# Patient Record
Sex: FEMALE | Race: WHITE | NOT HISPANIC OR LATINO | Employment: OTHER | URBAN - METROPOLITAN AREA
[De-identification: names, ages, dates, MRNs, and addresses within clinical notes are randomized per-mention and may not be internally consistent; named-entity substitution may affect disease eponyms.]

---

## 2017-01-03 ENCOUNTER — TELEPHONE (OUTPATIENT)
Dept: INTERNAL MEDICINE | Facility: CLINIC | Age: 82
End: 2017-01-03

## 2017-01-03 NOTE — TELEPHONE ENCOUNTER
----- Message from Kylie MAURICE Good sent at 1/3/2017  3:52 PM CST -----  Contact: Call Rowena, daughter, [phone]  Rowena called Requesting to speak to you concerning Pt taking Xarelto and having a dental procedure on Friday 01-06-17.  Pt has not taken medication today and is wanting to know if Pt can stop until after procedure and how long Pt should stop medication.

## 2017-01-03 NOTE — TELEPHONE ENCOUNTER
Spoke to pt's daughter, Rowena. She stated that the pt needs a wisdom tooth extracted. She stated that the oral surgeon is requesting how the pt should hold the xarelto.  I advised that Dr. Estrada does not prescribe xarelto, therefore she will need to contact the original perscriber in arrhythmia, or the pt's cardiologist for further instructions.

## 2017-01-04 ENCOUNTER — TELEPHONE (OUTPATIENT)
Dept: ELECTROPHYSIOLOGY | Facility: CLINIC | Age: 82
End: 2017-01-04

## 2017-01-04 NOTE — TELEPHONE ENCOUNTER
Nurse called and spoke with Nasreen and informed her that Dr Ann likes patients to hold xarelto 3 days prior to procedure so last dose for procedure on 1/6 17 would have been 1/2/17. Nurse further explained that it will be up to oral surgeon to decide when patient will be cleared to resume Xarelto after procedure. Nasreen acknowledged.    ----- Message from Jami Garcia MA sent at 1/3/2017  5:21 PM CST -----  Contact: nasreen daughter [phone]  Pt needs a clearance for extraction of wisdom tooth for Fri 1/6.  ----- Message -----     From: Davina Tsai MA     Sent: 1/3/2017   4:51 PM       To: Osman AGUIRRE Staff    She is calling about mother needs a clearance for extraction of wisdom tooth for Fri 1/6. Please call Nasreen. She 's on Xarelto will not take today.

## 2017-01-23 ENCOUNTER — CLINICAL SUPPORT (OUTPATIENT)
Dept: ELECTROPHYSIOLOGY | Facility: CLINIC | Age: 82
End: 2017-01-23
Payer: MEDICARE

## 2017-01-23 DIAGNOSIS — I44.2 CHB (COMPLETE HEART BLOCK): ICD-10-CM

## 2017-01-23 DIAGNOSIS — Z95.0 CARDIAC PACEMAKER IN SITU: ICD-10-CM

## 2017-01-23 PROCEDURE — 93294 REM INTERROG EVL PM/LDLS PM: CPT | Mod: ,,, | Performed by: INTERNAL MEDICINE

## 2017-01-23 PROCEDURE — 93296 REM INTERROG EVL PM/IDS: CPT | Mod: PBBFAC | Performed by: INTERNAL MEDICINE

## 2017-01-24 ENCOUNTER — OFFICE VISIT (OUTPATIENT)
Dept: PULMONOLOGY | Facility: CLINIC | Age: 82
End: 2017-01-24
Payer: MEDICARE

## 2017-01-24 ENCOUNTER — HOSPITAL ENCOUNTER (OUTPATIENT)
Dept: PULMONOLOGY | Facility: CLINIC | Age: 82
Discharge: HOME | End: 2017-01-24
Payer: MEDICARE

## 2017-01-24 ENCOUNTER — CLINICAL SUPPORT (OUTPATIENT)
Dept: INFECTIOUS DISEASES | Facility: CLINIC | Age: 82
End: 2017-01-24
Payer: MEDICARE

## 2017-01-24 ENCOUNTER — OFFICE VISIT (OUTPATIENT)
Dept: ENDOCRINOLOGY | Facility: CLINIC | Age: 82
End: 2017-01-24
Payer: MEDICARE

## 2017-01-24 VITALS
HEIGHT: 60 IN | BODY MASS INDEX: 26.7 KG/M2 | SYSTOLIC BLOOD PRESSURE: 108 MMHG | OXYGEN SATURATION: 95 % | DIASTOLIC BLOOD PRESSURE: 70 MMHG | HEART RATE: 83 BPM | WEIGHT: 136 LBS

## 2017-01-24 VITALS
SYSTOLIC BLOOD PRESSURE: 158 MMHG | DIASTOLIC BLOOD PRESSURE: 78 MMHG | WEIGHT: 136.69 LBS | HEART RATE: 80 BPM | HEIGHT: 60 IN | BODY MASS INDEX: 26.84 KG/M2

## 2017-01-24 DIAGNOSIS — E11.65 TYPE 2 DIABETES MELLITUS WITH HYPERGLYCEMIA, WITH LONG-TERM CURRENT USE OF INSULIN: Primary | Chronic | ICD-10-CM

## 2017-01-24 DIAGNOSIS — J84.9 ILD (INTERSTITIAL LUNG DISEASE): ICD-10-CM

## 2017-01-24 DIAGNOSIS — J84.10 PULMONARY INTERSTITIAL FIBROSIS: Primary | ICD-10-CM

## 2017-01-24 DIAGNOSIS — E55.9 VITAMIN D DEFICIENCY: ICD-10-CM

## 2017-01-24 DIAGNOSIS — Z79.4 TYPE 2 DIABETES MELLITUS WITH HYPERGLYCEMIA, WITH LONG-TERM CURRENT USE OF INSULIN: Primary | Chronic | ICD-10-CM

## 2017-01-24 PROCEDURE — 94729 DIFFUSING CAPACITY: CPT | Mod: 26,S$PBB,, | Performed by: INTERNAL MEDICINE

## 2017-01-24 PROCEDURE — 94727 GAS DIL/WSHOT DETER LNG VOL: CPT | Mod: 26,S$PBB,, | Performed by: INTERNAL MEDICINE

## 2017-01-24 PROCEDURE — 99999 PR PBB SHADOW E&M-EST. PATIENT-LVL III: CPT | Mod: PBBFAC,,, | Performed by: INTERNAL MEDICINE

## 2017-01-24 PROCEDURE — 99214 OFFICE O/P EST MOD 30 MIN: CPT | Mod: S$PBB,,, | Performed by: INTERNAL MEDICINE

## 2017-01-24 PROCEDURE — 90662 IIV NO PRSV INCREASED AG IM: CPT | Mod: PBBFAC | Performed by: INTERNAL MEDICINE

## 2017-01-24 PROCEDURE — 99999 PR PBB SHADOW E&M-EST. PATIENT-LVL I: CPT | Mod: PBBFAC,,,

## 2017-01-24 PROCEDURE — G0008 ADMIN INFLUENZA VIRUS VAC: HCPCS | Mod: PBBFAC

## 2017-01-24 PROCEDURE — G0009 ADMIN PNEUMOCOCCAL VACCINE: HCPCS | Mod: PBBFAC | Performed by: INTERNAL MEDICINE

## 2017-01-24 PROCEDURE — 99211 OFF/OP EST MAY X REQ PHY/QHP: CPT | Mod: PBBFAC,27

## 2017-01-24 PROCEDURE — 99214 OFFICE O/P EST MOD 30 MIN: CPT | Mod: 25,S$PBB,, | Performed by: INTERNAL MEDICINE

## 2017-01-24 NOTE — PROGRESS NOTES
Subjective:       Patient ID: Jahaira Garcia is a 88 y.o. female.    Chief Complaint: Diabetes Mellitus    HPI   Pt is a 85 yo CF here to follow up for T2DM. PMH: HTN, HLD, ILD on prednisone 10mg BID. Pt had acute bronchitis that required hospitalization in jan-February and took abx x 2weeks. Still requiring home O2 but no worsening of PFTs per pt. No SOB, no CP. Pt reports deconditioning since hospitalization, and now using a walker, which is new for her.  Now on prednisone 10 mg BID because of resp problems. Lately sugars high in 200's.  Checks BS regularly, FBS ~170s, bedtime BS 89. Pt had 3-4 readings in 50-60s during acute illness, but no recent hypoglycemic sx. Currently  On and Humalog 18 units for breakfast, 18 units for lunch, 18 units for dinner plus correction  Prednisone  and 5 mg BID.  C/o mild diarrhea x 1 month. Also c/o B feet swelling x 1 week, without PND or SOB, has 1 pillow orthopnea.   Glocose at home .  Good appetite, lost about 7lbs due to acute bronchitis   No hypoglycemia.  Review of Systems   Constitutional: Positive for activity change and fatigue. Negative for fever.   HENT: Positive for voice change.    Eyes: Negative for pain, redness and visual disturbance.   Respiratory: Positive for cough, shortness of breath and wheezing.    Cardiovascular: Positive for leg swelling. Negative for chest pain and palpitations.   Gastrointestinal: Negative for abdominal pain, blood in stool, constipation, diarrhea, nausea and vomiting.   Endocrine: Positive for cold intolerance. Negative for heat intolerance.   Genitourinary: Positive for enuresis. Negative for dysuria.   Neurological: Positive for light-headedness. Negative for dizziness, syncope and headaches.       Objective:      Physical Exam    Physical Exam   Constitutional: She is oriented to person, place, and time. Vital signs are normal. No distress.   She is on O2   HENT:   Mouth/Throat: Oropharynx is clear and moist.   Eyes: EOM are  normal.   Neck: No thyromegaly present.   Cardiovascular: Normal rate, regular rhythm, normal heart sounds and intact distal pulses. Exam reveals no gallop and no friction rub.   No murmur heard.  Pulmonary/Chest: Effort normal and breath sounds normal. No respiratory distress. She has no wheezes. She has no rales.   Coarse BS noted  Abdominal: She exhibits no distension. There is no tenderness. There is no rebound and no guarding.   Genitourinary: Vaginal discharge:   Musculoskeletal: Normal range of motion. She exhibits no edema.   Neurological: She is oriented to person, place, and time. She has normal reflexes. No cranial nerve deficit.   Skin: No rash noted.   Psychiatric: She has a normal mood and affect.     Results for orders placed or performed during the hospital encounter of 09/16/16   Basic Metabolic Panel (BMP)   Result Value Ref Range    Sodium 137 136 - 145 mmol/L    Potassium 5.3 (H) 3.5 - 5.1 mmol/L    Chloride 100 95 - 110 mmol/L    CO2 28 23 - 29 mmol/L    Glucose 153 (H) 70 - 110 mg/dL    BUN, Bld 23 8 - 23 mg/dL    Creatinine 1.0 0.5 - 1.4 mg/dL    Calcium 9.2 8.7 - 10.5 mg/dL    Anion Gap 9 8 - 16 mmol/L    eGFR if African American 58.5 (A) >60 mL/min/1.73 m^2    eGFR if non African American 50.8 (A) >60 mL/min/1.73 m^2   CBC auto differential   Result Value Ref Range    WBC 14.13 (H) 3.90 - 12.70 K/uL    RBC 3.93 (L) 4.00 - 5.40 M/uL    Hemoglobin 11.9 (L) 12.0 - 16.0 g/dL    Hematocrit 38.3 37.0 - 48.5 %    MCV 98 82 - 98 fL    MCH 30.3 27.0 - 31.0 pg    MCHC 31.1 (L) 32.0 - 36.0 %    RDW 15.1 (H) 11.5 - 14.5 %    Platelets 404 (H) 150 - 350 K/uL    MPV 9.2 9.2 - 12.9 fL    Lymph # CANCELED 1.0 - 4.8 K/uL    Mono # CANCELED 0.3 - 1.0 K/uL    Eos # CANCELED 0.0 - 0.5 K/uL    Baso # CANCELED 0.00 - 0.20 K/uL    Gran% 81.0 (H) 38.0 - 73.0 %    Lymph% 9.0 (L) 18.0 - 48.0 %    Mono% 4.0 4.0 - 15.0 %    Eosinophil% 0.0 0.0 - 8.0 %    Basophil% 0.0 0.0 - 1.9 %    Bands 3.0 %    Metamyelocytes 2.0 %     Myelocytes 1.0 %    Differential Method Manual    Magnesium   Result Value Ref Range    Magnesium 1.9 1.6 - 2.6 mg/dL   Phosphorus   Result Value Ref Range    Phosphorus 4.0 2.7 - 4.5 mg/dL   Basic metabolic panel   Result Value Ref Range    Sodium 136 136 - 145 mmol/L    Potassium 5.1 3.5 - 5.1 mmol/L    Chloride 99 95 - 110 mmol/L    CO2 31 (H) 23 - 29 mmol/L    Glucose 322 (H) 70 - 110 mg/dL    BUN, Bld 29 (H) 8 - 23 mg/dL    Creatinine 1.2 0.5 - 1.4 mg/dL    Calcium 9.0 8.7 - 10.5 mg/dL    Anion Gap 6 (L) 8 - 16 mmol/L    eGFR if African American 47.0 (A) >60 mL/min/1.73 m^2    eGFR if non  40.7 (A) >60 mL/min/1.73 m^2   CBC auto differential   Result Value Ref Range    WBC 9.18 3.90 - 12.70 K/uL    RBC 3.61 (L) 4.00 - 5.40 M/uL    Hemoglobin 11.0 (L) 12.0 - 16.0 g/dL    Hematocrit 34.9 (L) 37.0 - 48.5 %    MCV 97 82 - 98 fL    MCH 30.5 27.0 - 31.0 pg    MCHC 31.5 (L) 32.0 - 36.0 %    RDW 15.0 (H) 11.5 - 14.5 %    Platelets 380 (H) 150 - 350 K/uL    MPV 9.1 (L) 9.2 - 12.9 fL    Lymph # CANCELED 1.0 - 4.8 K/uL    Mono # CANCELED 0.3 - 1.0 K/uL    Eos # CANCELED 0.0 - 0.5 K/uL    Baso # CANCELED 0.00 - 0.20 K/uL    Gran% 77.0 (H) 38.0 - 73.0 %    Lymph% 12.0 (L) 18.0 - 48.0 %    Mono% 1.0 (L) 4.0 - 15.0 %    Eosinophil% 0.0 0.0 - 8.0 %    Basophil% 0.0 0.0 - 1.9 %    Bands 6.0 %    Metamyelocytes 4.0 %    Differential Method Manual    Basic Metabolic Panel (BMP)   Result Value Ref Range    Sodium 135 (L) 136 - 145 mmol/L    Potassium 4.7 3.5 - 5.1 mmol/L    Chloride 99 95 - 110 mmol/L    CO2 29 23 - 29 mmol/L    Glucose 126 (H) 70 - 110 mg/dL    BUN, Bld 30 (H) 8 - 23 mg/dL    Creatinine 1.2 0.5 - 1.4 mg/dL    Calcium 9.2 8.7 - 10.5 mg/dL    Anion Gap 7 (L) 8 - 16 mmol/L    eGFR if African American 47.0 (A) >60 mL/min/1.73 m^2    eGFR if non  40.7 (A) >60 mL/min/1.73 m^2   CBC auto differential   Result Value Ref Range    WBC 8.13 3.90 - 12.70 K/uL    RBC 3.63 (L) 4.00 - 5.40  M/uL    Hemoglobin 11.2 (L) 12.0 - 16.0 g/dL    Hematocrit 35.2 (L) 37.0 - 48.5 %    MCV 97 82 - 98 fL    MCH 30.9 27.0 - 31.0 pg    MCHC 31.8 (L) 32.0 - 36.0 %    RDW 15.0 (H) 11.5 - 14.5 %    Platelets 365 (H) 150 - 350 K/uL    MPV 9.4 9.2 - 12.9 fL    Gran # 5.7 1.8 - 7.7 K/uL    Lymph # 1.9 1.0 - 4.8 K/uL    Mono # 0.5 0.3 - 1.0 K/uL    Eos # 0.0 0.0 - 0.5 K/uL    Baso # 0.03 0.00 - 0.20 K/uL    Gran% 69.7 38.0 - 73.0 %    Lymph% 23.5 18.0 - 48.0 %    Mono% 6.3 4.0 - 15.0 %    Eosinophil% 0.1 0.0 - 8.0 %    Basophil% 0.4 0.0 - 1.9 %    Differential Method Automated    Magnesium   Result Value Ref Range    Magnesium 1.7 1.6 - 2.6 mg/dL   Phosphorus   Result Value Ref Range    Phosphorus 3.6 2.7 - 4.5 mg/dL   Basic metabolic panel   Result Value Ref Range    Sodium 136 136 - 145 mmol/L    Potassium 5.6 (H) 3.5 - 5.1 mmol/L    Chloride 96 95 - 110 mmol/L    CO2 28 23 - 29 mmol/L    Glucose 146 (H) 70 - 110 mg/dL    BUN, Bld 44 (H) 8 - 23 mg/dL    Creatinine 1.4 0.5 - 1.4 mg/dL    Calcium 9.1 8.7 - 10.5 mg/dL    Anion Gap 12 8 - 16 mmol/L    eGFR if African American 39.0 (A) >60 mL/min/1.73 m^2    eGFR if non  33.8 (A) >60 mL/min/1.73 m^2   Basic Metabolic Panel (BMP)   Result Value Ref Range    Sodium 138 136 - 145 mmol/L    Potassium 4.4 3.5 - 5.1 mmol/L    Chloride 100 95 - 110 mmol/L    CO2 27 23 - 29 mmol/L    Glucose 232 (H) 70 - 110 mg/dL    BUN, Bld 39 (H) 8 - 23 mg/dL    Creatinine 1.3 0.5 - 1.4 mg/dL    Calcium 8.3 (L) 8.7 - 10.5 mg/dL    Anion Gap 11 8 - 16 mmol/L    eGFR if African American 42.6 (A) >60 mL/min/1.73 m^2    eGFR if non  37.0 (A) >60 mL/min/1.73 m^2   CBC auto differential   Result Value Ref Range    WBC 9.22 3.90 - 12.70 K/uL    RBC 3.57 (L) 4.00 - 5.40 M/uL    Hemoglobin 10.9 (L) 12.0 - 16.0 g/dL    Hematocrit 34.8 (L) 37.0 - 48.5 %    MCV 98 82 - 98 fL    MCH 30.5 27.0 - 31.0 pg    MCHC 31.3 (L) 32.0 - 36.0 %    RDW 15.7 (H) 11.5 - 14.5 %    Platelets 280  150 - 350 K/uL    MPV 10.0 9.2 - 12.9 fL    Lymph # CANCELED 1.0 - 4.8 K/uL    Mono # CANCELED 0.3 - 1.0 K/uL    Eos # CANCELED 0.0 - 0.5 K/uL    Baso # CANCELED 0.00 - 0.20 K/uL    Gran% 72.0 38.0 - 73.0 %    Lymph% 15.0 (L) 18.0 - 48.0 %    Mono% 8.0 4.0 - 15.0 %    Eosinophil% 1.0 0.0 - 8.0 %    Basophil% 0.0 0.0 - 1.9 %    Bands 1.0 %    Metamyelocytes 3.0 %    Smudge Cells Present     Differential Method Manual    Magnesium   Result Value Ref Range    Magnesium 1.7 1.6 - 2.6 mg/dL   Phosphorus   Result Value Ref Range    Phosphorus 4.1 2.7 - 4.5 mg/dL   POCT glucose   Result Value Ref Range    POCT Glucose 308 (H) 70 - 110 mg/dL   POCT glucose   Result Value Ref Range    POCT Glucose 291 (H) 70 - 110 mg/dL   POCT glucose   Result Value Ref Range    POCT Glucose 233 (H) 70 - 110 mg/dL   POCT glucose   Result Value Ref Range    POCT Glucose 267 (H) 70 - 110 mg/dL   POCT glucose   Result Value Ref Range    POCT Glucose 133 (H) 70 - 110 mg/dL   POCT glucose   Result Value Ref Range    POCT Glucose 164 (H) 70 - 110 mg/dL   POCT glucose   Result Value Ref Range    POCT Glucose 248 (H) 70 - 110 mg/dL   POCT glucose   Result Value Ref Range    POCT Glucose 237 (H) 70 - 110 mg/dL   POCT glucose   Result Value Ref Range    POCT Glucose 149 (H) 70 - 110 mg/dL   POCT glucose   Result Value Ref Range    POCT Glucose 217 (H) 70 - 110 mg/dL   POCT glucose   Result Value Ref Range    POCT Glucose 231 (H) 70 - 110 mg/dL   POCT glucose   Result Value Ref Range    POCT Glucose 85 70 - 110 mg/dL   POCT glucose   Result Value Ref Range    POCT Glucose 302 (H) 70 - 110 mg/dL   POCT glucose   Result Value Ref Range    POCT Glucose 318 (H) 70 - 110 mg/dL   POCT glucose   Result Value Ref Range    POCT Glucose 270 (H) 70 - 110 mg/dL   POCT glucose   Result Value Ref Range    POCT Glucose 101 70 - 110 mg/dL   POCT glucose   Result Value Ref Range    POCT Glucose 155 (H) 70 - 110 mg/dL   POCT glucose   Result Value Ref Range    POCT  Glucose 289 (H) 70 - 110 mg/dL   POCT glucose   Result Value Ref Range    POCT Glucose 214 (H) 70 - 110 mg/dL   POCT glucose   Result Value Ref Range    POCT Glucose 186 (H) 70 - 110 mg/dL   POCT glucose   Result Value Ref Range    POCT Glucose 335 (H) 70 - 110 mg/dL   POCT glucose   Result Value Ref Range    POCT Glucose 162 (H) 70 - 110 mg/dL   POCT glucose   Result Value Ref Range    POCT Glucose 281 (H) 70 - 110 mg/dL   POCT glucose   Result Value Ref Range    POCT Glucose 273 (H) 70 - 110 mg/dL   POCT glucose   Result Value Ref Range    POCT Glucose 302 (H) 70 - 110 mg/dL   POCT glucose   Result Value Ref Range    POCT Glucose 173 (H) 70 - 110 mg/dL   POCT glucose   Result Value Ref Range    POCT Glucose 238 (H) 70 - 110 mg/dL   POCT glucose   Result Value Ref Range    POCT Glucose 305 (H) 70 - 110 mg/dL   POCT glucose   Result Value Ref Range    POCT Glucose 174 (H) 70 - 110 mg/dL   POCT glucose   Result Value Ref Range    POCT Glucose 98 70 - 110 mg/dL   POCT glucose   Result Value Ref Range    POCT Glucose 129 (H) 70 - 110 mg/dL   POCT glucose   Result Value Ref Range    POCT Glucose 200 (H) 70 - 110 mg/dL   POCT glucose   Result Value Ref Range    POCT Glucose 310 (H) 70 - 110 mg/dL   POCT glucose   Result Value Ref Range    POCT Glucose 300 (H) 70 - 110 mg/dL   POCT glucose   Result Value Ref Range    POCT Glucose 145 (H) 70 - 110 mg/dL   POCT glucose   Result Value Ref Range    POCT Glucose 158 (H) 70 - 110 mg/dL   POCT glucose   Result Value Ref Range    POCT Glucose 133 (H) 70 - 110 mg/dL   POCT glucose   Result Value Ref Range    POCT Glucose 159 (H) 70 - 110 mg/dL   POCT glucose   Result Value Ref Range    POCT Glucose 255 (H) 70 - 110 mg/dL   POCT glucose   Result Value Ref Range    POCT Glucose 81 70 - 110 mg/dL   POCT glucose   Result Value Ref Range    POCT Glucose 291 (H) 70 - 110 mg/dL   POCT glucose   Result Value Ref Range    POCT Glucose 260 (H) 70 - 110 mg/dL   POCT glucose   Result Value  Ref Range    POCT Glucose 164 (H) 70 - 110 mg/dL     Assessment:       No diagnosis found.  Currently taking prednisone 5 mg daily  Plan:       Jahaira was seen today for diabetes mellitus.    Diagnoses and associated orders for this visit:    Diabetes mellitus, type 2  A1c 7.6%, controlled  Decrease metfromin from 750 mg  increase Humalog 18-18-18 plus correction.    BP at goal, on ARB  LDL at goal  ophtho UTD  UMAB 48 on ARB   -     ;   - Hemoglobin A1c; today  - Basic metabolic panel; Future    Influenza  Pneumococcal vaccine 23.

## 2017-01-24 NOTE — MR AVS SNAPSHOT
[facility] - Endo/Diab/Metab  [address]  [place] [address]  Phone: [phone]  Fax: [phone]                  Jahaira Garcia   2017 1:30 PM   Office Visit    Description:  Female : 1928   Provider:  Nelson Agrawal II, MD   [facility]           Reason for Visit     Diabetes Mellitus           Diagnoses this Visit        Comments    Type 2 diabetes mellitus with hyperglycemia, with long-term current use of insulin    -  Primary     Vitamin D deficiency                To Do List           Future Appointments        Provider Department Dept Phone    2017 2:30 PM Gonzalo Gordon MD [facility] - Pulmonary Services [phone]    2017 3:10 PM LAB, APPOINTMENT [place] [facility] [phone]    2017 3:30 PM INJECTION, INFECTIOUS DISEASES [facility] ID Injection Room [phone]    2017 3:40 PM INJECTION, INFECTIOUS DISEASES [facility] ID Injection Room [phone]    2017 1:00 PM Nehemiah Hurt MD [facility] Neurology [phone]      Goals (5 Years of Data)     None      [facility]     [facility] Care Line -  Assistance  Registered nurses in the [facility] provide clinical advisement, health education, appointment booking, and other advisory services.  Call for this free service at [phone].             Medications           Message regarding Medications     Verify the changes and/or additions to your medication regime listed below are the same as discussed with your clinician today.  If any of these changes or additions are incorrect, please notify your healthcare provider.        STOP taking these medications     aspirin (ECOTRIN) 81 MG EC tablet Take 1 tablet by mouth Daily.    ergocalciferol (ERGOCALCIFEROL) 50,000 unit Cap Take 1 capsule (50,000 Units total) by mouth every 7 days.    levetiracetam (KEPPRA) 250 MG Tab Take 1 tablet (250 mg total) by  "mouth 2 (two) times daily.    senna-docusate 8.6-50 mg (PERICOLACE) 8.6-50 mg per tablet Take 1 tablet by mouth 2 (two) times daily as needed for Constipation.           Verify that the below list of medications is an accurate representation of the medications you are currently taking.  If none reported, the list may be blank. If incorrect, please contact your healthcare provider. Carry this list with you in case of emergency.           Current Medications     acetaminophen (TYLENOL) 325 MG tablet Take 1-2 tablets (325-650 mg total) by mouth nightly as needed for Pain.    albuterol-ipratropium 2.5mg-0.5mg/3mL (DUO-NEB) 0.5 mg-3 mg(2.5 mg base)/3 mL nebulizer solution Take 3 mLs by nebulization every evening. And every 6 hours as needed.    artificial tears (ISOPTO TEARS) 0.5 % ophthalmic solution Place 1 drop into both eyes as needed.    BD ULTRA-FINE CHARAN PEN NEEDLES 32 gauge x 5/32" Ndle USE ONE 3 TIMES DAILY    carvedilol (COREG) 3.125 MG tablet Take 1 tablet (3.125 mg total) by mouth 2 (two) times daily.    FOLIC ACID/MULTIVIT-MIN/LUTEIN (CENTRUM SILVER ORAL) Take 1 tablet by mouth once daily.    insulin lispro (HUMALOG KWIKPEN) 100 unit/mL InPn pen Patient injects 18 units with breakfast, 18 units with lunch, and 18 units dinner.    lancets (ONETOUCH DELICA LANCETS) 33 gauge Misc 1 lancet by Misc.(Non-Drug; Combo Route) route 3 (three) times daily.    levalbuterol (XOPENEX) 0.63 mg/3 mL nebulizer solution USE ONE VIAL TWICE DAILY    losartan (COZAAR) 50 MG tablet Take 1 tablet (50 mg total) by mouth 2 (two) times daily with meals.    ONETOUCH VERIO Strp USE ONE 5 TIMES DAILY    pravastatin (PRAVACHOL) 20 MG tablet Take 1 tablet (20 mg total) by mouth once daily.    predniSONE (DELTASONE) 10 MG tablet Take 1 tablet (10 mg total) by mouth as directed. Take 10 mg every morning and 5 mg every evening    rivaroxaban (XARELTO) 15 mg Tab Take 1 tablet (15 mg total) by mouth daily with dinner or evening meal.         "   Clinical Reference Information           Vital Signs - Last Recorded  Most recent update: 1/24/2017  1:41 PM by Lashay Castaneda MA    BP Pulse Ht Wt LMP BMI    (!) 158/78 (BP Location: Right arm, Patient Position: Sitting, BP Method: Manual) 80 5' (1.524 m) 62 kg (136 lb 11 oz) (LMP Unknown) 26.69 kg/m2      Blood Pressure          Most Recent Value    BP  (!)  158/78      Allergies as of 1/24/2017     Tramadol      Immunizations Administered on Date of Encounter - 1/24/2017     Name Date Dose VIS Date Route    Influenza - High Dose  Incomplete 0.5 mL 8/7/2015 Intramuscular    Pneumococcal Polysaccharide - 23 Valent  Incomplete 0.5 mL 4/24/2015 Intramuscular      Orders Placed During Today's Visit      Normal Orders This Visit    Influenza - High Dose (65+) (PF) (IM)     Pneumococcal Polysaccharide Vaccine (23 Valent) (SQ/IM)     Future Labs/Procedures Expected by Expires    Hemoglobin A1c  1/24/2017 3/25/2018    Hemoglobin A1c  1/24/2017 3/25/2018

## 2017-01-25 RX ORDER — PREDNISONE 10 MG/1
10 TABLET ORAL EVERY MORNING
Qty: 90 TABLET | Refills: 11 | Status: ON HOLD | OUTPATIENT
Start: 2017-01-25 | End: 2017-08-18 | Stop reason: HOSPADM

## 2017-01-26 NOTE — PROGRESS NOTES
"Subjective:       Patient ID: Jahaira Garcia is a 88 y.o. female.    Chief Complaint: Interstitial Lung Disease and Shortness of Breath    HPIDelightful 87 yo female with respiratory failure secondary to pulmonary fibrosis comes for her periodic visit. nery saw me and Dr. Agrawal today. She is on 5 mg of prednisone BID and is stable  PFT's show moderate restrictive impairment and DLCO is reduced at 46% and FVC is 41% of predicted. She is quite lethargic  But responds to questions and denies any acute problems. This it the least animated that I have ever seen her. Chest x-ray in September confims a pacemaker in left chest and moderate increased interstitial markings.       No flowsheet data found.]  Review of Systems   Constitutional: Negative.    HENT: Negative.    Eyes: Negative.    Respiratory: Positive for cough and dyspnea on extertion.         Pulmonary Fibrosis   Cardiovascular: Negative.    Genitourinary: Negative.    Endocrine: Type II diabetes   Musculoskeletal: Negative.    Skin: Negative.    Gastrointestinal: Negative.    Neurological: Negative.    Psychiatric/Behavioral: Negative.        Objective:      Physical Exam   Pulmonary/Chest:   Decreased breath sounds without crackles   Musculoskeletal: She exhibits no edema.           Assessment:       1. ILD (interstitial lung disease)        Outpatient Encounter Prescriptions as of 1/24/2017   Medication Sig Dispense Refill    acetaminophen (TYLENOL) 325 MG tablet Take 1-2 tablets (325-650 mg total) by mouth nightly as needed for Pain.      albuterol-ipratropium 2.5mg-0.5mg/3mL (DUO-NEB) 0.5 mg-3 mg(2.5 mg base)/3 mL nebulizer solution Take 3 mLs by nebulization every evening. And every 6 hours as needed.  0    artificial tears (ISOPTO TEARS) 0.5 % ophthalmic solution Place 1 drop into both eyes as needed.      BD ULTRA-FINE CHARAN PEN NEEDLES 32 gauge x 5/32" Ndle USE ONE 3 TIMES DAILY 300 each 0    carvedilol (COREG) 3.125 MG tablet Take 1 tablet (3.125 " mg total) by mouth 2 (two) times daily. 60 tablet 5    FOLIC ACID/MULTIVIT-MIN/LUTEIN (CENTRUM SILVER ORAL) Take 1 tablet by mouth once daily.      insulin lispro (HUMALOG KWIKPEN) 100 unit/mL InPn pen Patient injects 18 units with breakfast, 18 units with lunch, and 18 units dinner. 3 Box 3    lancets (ONETOUCH DELICA LANCETS) 33 gauge Misc 1 lancet by Misc.(Non-Drug; Combo Route) route 3 (three) times daily. 100 each 6    levalbuterol (XOPENEX) 0.63 mg/3 mL nebulizer solution USE ONE VIAL TWICE DAILY 504 mL 0    losartan (COZAAR) 50 MG tablet Take 1 tablet (50 mg total) by mouth 2 (two) times daily with meals. 90 tablet 3    ONETOUCH VERIO Strp USE ONE 5 TIMES DAILY 150 each 11    pravastatin (PRAVACHOL) 20 MG tablet Take 1 tablet (20 mg total) by mouth once daily. 90 tablet 3    predniSONE (DELTASONE) 10 MG tablet Take 1 tablet (10 mg total) by mouth every morning. 1 tablet every morning 90 tablet 11    rivaroxaban (XARELTO) 15 mg Tab Take 1 tablet (15 mg total) by mouth daily with dinner or evening meal. 30 tablet 11    [DISCONTINUED] aspirin (ECOTRIN) 81 MG EC tablet Take 1 tablet by mouth Daily.      [DISCONTINUED] ergocalciferol (ERGOCALCIFEROL) 50,000 unit Cap Take 1 capsule (50,000 Units total) by mouth every 7 days. 8 capsule 0    [DISCONTINUED] levetiracetam (KEPPRA) 250 MG Tab Take 1 tablet (250 mg total) by mouth 2 (two) times daily. 60 tablet 5    [DISCONTINUED] predniSONE (DELTASONE) 10 MG tablet Take 1 tablet (10 mg total) by mouth as directed. Take 10 mg every morning and 5 mg every evening (Patient taking differently: Take 5 mg by mouth 2 (two) times daily. Take 10 mg every morning and 5 mg every evening) 180 tablet 3    [DISCONTINUED] senna-docusate 8.6-50 mg (PERICOLACE) 8.6-50 mg per tablet Take 1 tablet by mouth 2 (two) times daily as needed for Constipation.       No facility-administered encounter medications on file as of 1/24/2017.      No orders of the defined types were  placed in this encounter.    Plan:     * Patient is certianly losing ground  There is nothing new to add and supportive care is appropriate.IMP: Chronic respiratory failure secondary to pulmonary fibrosis.

## 2017-02-01 ENCOUNTER — OFFICE VISIT (OUTPATIENT)
Dept: NEUROLOGY | Facility: CLINIC | Age: 82
End: 2017-02-01
Payer: MEDICARE

## 2017-02-01 VITALS — HEIGHT: 61 IN | SYSTOLIC BLOOD PRESSURE: 150 MMHG | HEART RATE: 78 BPM | DIASTOLIC BLOOD PRESSURE: 70 MMHG

## 2017-02-01 DIAGNOSIS — E53.8 DEFICIENCY OF OTHER SPECIFIED B GROUP VITAMINS: ICD-10-CM

## 2017-02-01 DIAGNOSIS — N18.30 CKD (CHRONIC KIDNEY DISEASE), STAGE III: Chronic | ICD-10-CM

## 2017-02-01 DIAGNOSIS — I10 ESSENTIAL HYPERTENSION: Primary | Chronic | ICD-10-CM

## 2017-02-01 DIAGNOSIS — G62.9 NEUROPATHY: ICD-10-CM

## 2017-02-01 PROCEDURE — 99999 PR PBB SHADOW E&M-EST. PATIENT-LVL III: CPT | Mod: PBBFAC,GC,, | Performed by: PSYCHIATRY & NEUROLOGY

## 2017-02-01 PROCEDURE — 99213 OFFICE O/P EST LOW 20 MIN: CPT | Mod: S$PBB,GC,, | Performed by: PSYCHIATRY & NEUROLOGY

## 2017-02-01 PROCEDURE — 99213 OFFICE O/P EST LOW 20 MIN: CPT | Mod: PBBFAC | Performed by: PSYCHIATRY & NEUROLOGY

## 2017-02-01 NOTE — PROGRESS NOTES
"Patient Name: Jahaira Garcia  MRN: [id number]    CC:  Hospital follow up - [facility]    HPI: Jahaira Garcia is a 88 y.o. female with PMHx of Pulmonary fibrosis, DMII, HTN, Complete heart block s/p Pacemaker, A fib (on Xarelto) presents to neurology clinic for hospital follow up.     Interval History:  Since last visit, patient was tapered off keppra and she has had no events concerning for seizure like activity. Patient also reports some improvement in lethargy however it continues to be a significant concern for her. She reports having issues with day time sleepiness usually after she has her meals. Reports waking up 2 times at night to use the restroom. Unsure if patient snores or has has apneic spells while she sleeps.     HPI from admit in 09/2016:  87 y.o. F with PMHx of DMII, HTN, HLD, pulmonary fibrosis, complete heart block s/p pacemaker placement in 6/2016 presented 9/6 complaining of productive cough and congestion. Patient had episode of transient confusion and weakness yesterday at home. She was unable to get up from recliner yesterday and did not press her Life Alert button. Daughter requested neuro consultation considering past episodes where patient was altered. First episode was last August when patient was found to be hypoglycemic and had a fall. She had another episode in May where she "blacked out on the toilet." Daughter said the most concerning thing about episode yesterday was it seemed like patient was unable to think to press her Life Alert button. Patient denies any history of seizures and denies jerking, LOC, urinary or bowel incontinence, tongue biting associated with these events. She felt tired for about 15 minutes after episode and was fully aware during the event. She lives alone and has been walking with a walker for years. Her daughter manages her finances and she has not driven for quite some time. She is followed by Dr. Gordon for interstitial lung disease and recently saw him on 8/30 at " which time he reduced her prednisone from 20 mg to 10 mg am and 5 mg PM. She denies any other recent medication changes.     EEG during that admission was negative for epileptiform activity; she was started on keppra 250mg TWICE DAILY during that admission. No recurrent episodes since discharge. She does report being very lethargic and sleeping most of the day since discharge. She was recently started on Xarelto in 10/2016 for A fib. She has had one fall since then - this past Sunday - not associated with loss of consciousness or lightheadedness but rather mechanical. Has not resumed the Xarelto since the fall. Denies headaches, weakness, paresthesias or other neurologic symptoms post most recent fall.     ROS:   Review of Systems   Constitutional: +fatigue. Negative for weight loss.   HENT: Negative for hearing loss.   Eyes: Negative for blurred vision and double vision.   Respiratory: Negative for shortness of breath and stridor.   Cardiovascular: Negative for chest pain and palpitations.   Gastrointestinal: Negative for nausea, vomiting and constipation.   Genitourinary: Negative for frequency. Negative for urgency.   Musculoskeletal: Negative for joint pain.  Skin: Negative for rash.   Neurological: Negative for tremors. Negative for focal weakness and seizures.   Endo/Heme/Allergies: Does not bruise/bleed easily.   Psychiatric/Behavioral: Negative for memory loss. Negative for depression and hallucinations. The patient is not nervous/anxious.      Past Medical History  Past Medical History   Diagnosis Date    Arthritis     Basal cell carcinoma     Complete heart block 2016    Diabetes mellitus type II     GERD (gastroesophageal reflux disease)     Hiatal hernia     HTN (hypertension)     Hyperlipidemia     Lung disease, interstitial     OA (osteoarthritis)     Pulmonary fibrosis     PVC (premature ventricular contraction)     Steroid long-term use 11/8/2012       Medications    Current Outpatient  "Prescriptions:     acetaminophen (TYLENOL) 325 MG tablet, Take 1-2 tablets (325-650 mg total) by mouth nightly as needed for Pain., Disp: , Rfl:     albuterol-ipratropium 2.5mg-0.5mg/3mL (DUO-NEB) 0.5 mg-3 mg(2.5 mg base)/3 mL nebulizer solution, Take 3 mLs by nebulization every evening. And every 6 hours as needed., Disp: , Rfl: 0    artificial tears (ISOPTO TEARS) 0.5 % ophthalmic solution, Place 1 drop into both eyes as needed., Disp: , Rfl:     BD ULTRA-FINE CHARAN PEN NEEDLES 32 gauge x 5/32" Ndle, USE ONE 3 TIMES DAILY, Disp: 300 each, Rfl: 0    carvedilol (COREG) 3.125 MG tablet, Take 1 tablet (3.125 mg total) by mouth 2 (two) times daily., Disp: 60 tablet, Rfl: 5    FOLIC ACID/MULTIVIT-MIN/LUTEIN (CENTRUM SILVER ORAL), Take 1 tablet by mouth once daily., Disp: , Rfl:     insulin lispro (HUMALOG KWIKPEN) 100 unit/mL InPn pen, Patient injects 18 units with breakfast, 18 units with lunch, and 18 units dinner., Disp: 3 Box, Rfl: 3    lancets (ONETOUCH DELICA LANCETS) 33 gauge Misc, 1 lancet by Misc.(Non-Drug; Combo Route) route 3 (three) times daily., Disp: 100 each, Rfl: 6    levalbuterol (XOPENEX) 0.63 mg/3 mL nebulizer solution, USE ONE VIAL TWICE DAILY, Disp: 504 mL, Rfl: 0    losartan (COZAAR) 50 MG tablet, Take 1 tablet (50 mg total) by mouth 2 (two) times daily with meals., Disp: 90 tablet, Rfl: 3    ONETOUCH VERIO Strp, USE ONE 5 TIMES DAILY, Disp: 150 each, Rfl: 11    pravastatin (PRAVACHOL) 20 MG tablet, Take 1 tablet (20 mg total) by mouth once daily., Disp: 90 tablet, Rfl: 3    predniSONE (DELTASONE) 10 MG tablet, Take 1 tablet (10 mg total) by mouth every morning. 1 tablet every morning, Disp: 90 tablet, Rfl: 11    rivaroxaban (XARELTO) 15 mg Tab, Take 1 tablet (15 mg total) by mouth daily with dinner or evening meal., Disp: 30 tablet, Rfl: 11    Allergies  Review of patient's allergies indicates:   Allergen Reactions    Tramadol      Confusion and sleeping and unsteady.       Social " "History  Social History     Social History    Marital status:      Spouse name: N/A    Number of children: N/A    Years of education: N/A     Occupational History    retired      Social History Main Topics    Smoking status: Never Smoker    Smokeless tobacco: Never Used    Alcohol use No    Drug use: No    Sexual activity: No     Other Topics Concern    Not on file     Social History Narrative    Lives alone       Family History  Family History   Problem Relation Age of Onset    Tuberculosis Mother     Tuberculosis Father        Physical Exam  Visit Vitals    BP (!) 150/70    Pulse 78    Ht 5' 1" (1.549 m)    LMP  (LMP Unknown)       General appearance: Well-developed, well-groomed.     Neurologic Exam: The patient is awake, alert and oriented. Language is fluent. Recent and remote memory are normal. Attention span and concentration are normal. Fund of knowledge is appropriate.     Cranial nerves: pupils are round and reactive to light and accommodation. Visual fields are full to confrontation. Ocular motility is full in all cardinal positions of gaze. Facial sensation is normal to pinprick and light touch. Corneal reflexes are present bilaterally. Facial activation is symmetric. Hearing is normal bilaterally. Palate elevates symmetrically and gag reflex is intact bilaterally. Shoulder elevation is symmetric and full strength bilaterally. Tongue is midline and neck rotation strength is normal bilaterally. Neck range of motion is normal.     Motor examination Strength is 5/5 in the upper extremities bilaterally without pronator drift; 4-/5 in the bilateral lower extremities proximally and distally.      Sensory examination is normal to pinprick, vibration and proprioception in the upper and lower extremities bilaterally.     Deep tendon reflexes are 2+ and symmetric in the upper and lower extremities bilaterally. Toes are mute bilaterally.     Coordination: Finger to nose is normal in both " upper extremities. Rapid alternating movements are normal in both upper extremities.     General exam  Cardiovascular: regular rate and rhythm with no murmurs, rubs or gallops. There are no carotid or vertebral artery bruits. Pulses in both upper and lower extremities are symmetric. There is no peripheral edema.   Head and neck: no cervical lymphadenopathy      Lab and Test Results    WBC   Date Value Ref Range Status   09/26/2016 9.22 3.90 - 12.70 K/uL Final   09/22/2016 8.13 3.90 - 12.70 K/uL Final   09/20/2016 9.18 3.90 - 12.70 K/uL Final     Hemoglobin   Date Value Ref Range Status   09/26/2016 10.9 (L) 12.0 - 16.0 g/dL Final   09/22/2016 11.2 (L) 12.0 - 16.0 g/dL Final   09/20/2016 11.0 (L) 12.0 - 16.0 g/dL Final     POC Hematocrit   Date Value Ref Range Status   06/23/2016 33 (L) 36 - 54 %PCV Final     Hematocrit   Date Value Ref Range Status   09/26/2016 34.8 (L) 37.0 - 48.5 % Final   09/22/2016 35.2 (L) 37.0 - 48.5 % Final   09/20/2016 34.9 (L) 37.0 - 48.5 % Final     Platelets   Date Value Ref Range Status   09/26/2016 280 150 - 350 K/uL Final   09/22/2016 365 (H) 150 - 350 K/uL Final   09/20/2016 380 (H) 150 - 350 K/uL Final     Glucose   Date Value Ref Range Status   09/26/2016 232 (H) 70 - 110 mg/dL Final   09/25/2016 146 (H) 70 - 110 mg/dL Final   09/22/2016 126 (H) 70 - 110 mg/dL Final     Sodium   Date Value Ref Range Status   09/26/2016 138 136 - 145 mmol/L Final   09/25/2016 136 136 - 145 mmol/L Final   09/22/2016 135 (L) 136 - 145 mmol/L Final     Potassium   Date Value Ref Range Status   09/26/2016 4.4 3.5 - 5.1 mmol/L Final   09/25/2016 5.6 (H) 3.5 - 5.1 mmol/L Final     Comment:     *No Visible Hemolysis   09/22/2016 4.7 3.5 - 5.1 mmol/L Final     Chloride   Date Value Ref Range Status   09/26/2016 100 95 - 110 mmol/L Final   09/25/2016 96 95 - 110 mmol/L Final   09/22/2016 99 95 - 110 mmol/L Final     CO2   Date Value Ref Range Status   09/26/2016 27 23 - 29 mmol/L Final   09/25/2016 28 23 - 29  mmol/L Final   09/22/2016 29 23 - 29 mmol/L Final     BUN, Bld   Date Value Ref Range Status   09/26/2016 39 (H) 8 - 23 mg/dL Final   09/25/2016 44 (H) 8 - 23 mg/dL Final   09/22/2016 30 (H) 8 - 23 mg/dL Final     Creatinine   Date Value Ref Range Status   09/26/2016 1.3 0.5 - 1.4 mg/dL Final   09/25/2016 1.4 0.5 - 1.4 mg/dL Final   09/22/2016 1.2 0.5 - 1.4 mg/dL Final     Calcium   Date Value Ref Range Status   09/26/2016 8.3 (L) 8.7 - 10.5 mg/dL Final   09/25/2016 9.1 8.7 - 10.5 mg/dL Final   09/22/2016 9.2 8.7 - 10.5 mg/dL Final     Magnesium   Date Value Ref Range Status   09/26/2016 1.7 1.6 - 2.6 mg/dL Final   09/22/2016 1.7 1.6 - 2.6 mg/dL Final   09/19/2016 1.9 1.6 - 2.6 mg/dL Final     Phosphorus   Date Value Ref Range Status   09/26/2016 4.1 2.7 - 4.5 mg/dL Final   09/22/2016 3.6 2.7 - 4.5 mg/dL Final   09/19/2016 4.0 2.7 - 4.5 mg/dL Final     Alkaline Phosphatase   Date Value Ref Range Status   09/16/2016 49 (L) 55 - 135 U/L Final   09/15/2016 50 (L) 55 - 135 U/L Final   09/14/2016 47 (L) 55 - 135 U/L Final     ALT   Date Value Ref Range Status   09/16/2016 24 10 - 44 U/L Final   09/15/2016 24 10 - 44 U/L Final   09/14/2016 25 10 - 44 U/L Final     AST   Date Value Ref Range Status   09/16/2016 21 10 - 40 U/L Final   09/15/2016 25 10 - 40 U/L Final   09/14/2016 30 10 - 40 U/L Final         Images:     CT head without contrast (9/11/2016)  No detrimental change or acute intracranial findings identified.    Independently reviewed : yes      Assessment and Plan    Jahaira Garcia is a 88 y.o. female with PMHx of Pulmonary fibrosis, DMII, HTN, Complete heart block s/p Pacemaker, A fib (on Xarelto) presents to neurology clinic for hospital follow up. EEG done in the hospital was negative. Patient off keppra and has remained seizure free.     Plan:  - Recommend sleep hygiene - tips were provided to patient and her daughter  - Recommend melatonin 3-5mg qpm PRN.  - Follow up in 3 months or sooner if  carissa Hurt  Neurology Resident - PGY 2  Department of Neurology  [address] [place] [address]

## 2017-02-02 NOTE — PROGRESS NOTES
I have seen the patient, reviewed the  history and physical, assessment and plan. I have personally interviewed and examined the patient at bedside and: agree with the findings.   OF NOTE, melatonin dose should be 0.1-0.3mg QHS PRN for sleep.

## 2017-03-01 ENCOUNTER — TELEPHONE (OUTPATIENT)
Dept: NEUROLOGY | Facility: CLINIC | Age: 82
End: 2017-03-01

## 2017-03-01 NOTE — TELEPHONE ENCOUNTER
----- Message from Lakia Paul sent at 2/27/2017  4:02 PM CST -----  Contact: loida (daughter) @ [phone]  Calling for pts lab results from 2-1-17.  pls call.

## 2017-03-07 RX ORDER — BLOOD-GLUCOSE METER
EACH MISCELLANEOUS
Qty: 450 EACH | Refills: 12 | Status: SHIPPED | OUTPATIENT
Start: 2017-03-07 | End: 2017-11-28 | Stop reason: SDUPTHER

## 2017-03-17 ENCOUNTER — OFFICE VISIT (OUTPATIENT)
Dept: DERMATOLOGY | Facility: CLINIC | Age: 82
End: 2017-03-17
Payer: MEDICARE

## 2017-03-17 VITALS — BODY MASS INDEX: 25.7 KG/M2 | WEIGHT: 136 LBS

## 2017-03-17 DIAGNOSIS — Z85.828 HISTORY OF SKIN CANCER: ICD-10-CM

## 2017-03-17 DIAGNOSIS — L82.1 SEBORRHEIC KERATOSES: Primary | ICD-10-CM

## 2017-03-17 PROCEDURE — 99213 OFFICE O/P EST LOW 20 MIN: CPT | Mod: S$PBB,,, | Performed by: DERMATOLOGY

## 2017-03-17 PROCEDURE — 99999 PR PBB SHADOW E&M-EST. PATIENT-LVL II: CPT | Mod: PBBFAC,,, | Performed by: DERMATOLOGY

## 2017-03-17 PROCEDURE — 99212 OFFICE O/P EST SF 10 MIN: CPT | Mod: PBBFAC,PO | Performed by: DERMATOLOGY

## 2017-03-17 RX ORDER — PREDNISONE 5 MG/1
TABLET ORAL
Status: ON HOLD | COMMUNITY
Start: 2017-01-27 | End: 2018-01-04 | Stop reason: HOSPADM

## 2017-03-17 NOTE — PROGRESS NOTES
Subjective:       Patient ID:  Jahaira Garcia is a 88 y.o. female who presents for   Chief Complaint   Patient presents with    Skin Check     UBSE    Spot     HPI Comments: History of Present Illness: The patient presents with chief complaint of spot.  Location: neck  Duration: months  Signs/Symptoms: itch    Prior treatments: none      Spot         Review of Systems   Constitutional: Negative for fever.   Skin: Negative for itching and rash.   Hematologic/Lymphatic: Does not bruise/bleed easily.        Objective:    Physical Exam   Constitutional: She appears well-developed and well-nourished. No distress.   Neurological: She is alert and oriented to person, place, and time. She is not disoriented.   Psychiatric: She has a normal mood and affect.   Skin:   Areas Examined (abnormalities noted in diagram):   Scalp / Hair Palpated and Inspected  Head / Face Inspection Performed  Neck Inspection Performed  Chest / Axilla Inspection Performed  Abdomen Inspection Performed  Back Inspection Performed  RUE Inspected  LUE Inspection Performed  Nails and Digits Inspection Performed              Diagram Legend        Pigmented verrucoid papule/plaque c/w seborrheic keratosis         Assessment / Plan:        Seborrheic keratoses  reassurance  cerave lotions with pramoxine  aveeno cleanser    History of skin cancer  Comments:  bcc on right lower leg              Return in about 1 year (around 3/17/2018).

## 2017-03-17 NOTE — MR AVS SNAPSHOT
"    [place] [facility]   [facility]  [place] [address]  Phone: [phone]  Fax: [phone]                  Jahaira Garcia   3/17/2017 2:10 PM   Office Visit    Description:  Female : 1928   Provider:  Charlene Wu MD   Department:  [place] [facility]           Reason for Visit     Skin Check     Spot                To Do List           Future Appointments        Provider Department Dept Phone    2017 8:00 AM HOME MONITOR DEVICE CHECK, [facility] [address] - Arrhythmia [phone]      Goals (5 Years of Data)     None      Follow-Up and Disposition     Return in about 1 year (around 3/17/2018).      [facility]     [facility] On Call Nurse Care Line -  Assistance  Registered nurses in the [facility] On Call Center provide clinical advisement, health education, appointment booking, and other advisory services.  Call for this free service at [phone].             Medications           Message regarding Medications     Verify the changes and/or additions to your medication regime listed below are the same as discussed with your clinician today.  If any of these changes or additions are incorrect, please notify your healthcare provider.             Verify that the below list of medications is an accurate representation of the medications you are currently taking.  If none reported, the list may be blank. If incorrect, please contact your healthcare provider. Carry this list with you in case of emergency.           Current Medications     acetaminophen (TYLENOL) 325 MG tablet Take 1-2 tablets (325-650 mg total) by mouth nightly as needed for Pain.    albuterol-ipratropium 2.5mg-0.5mg/3mL (DUO-NEB) 0.5 mg-3 mg(2.5 mg base)/3 mL nebulizer solution Take 3 mLs by nebulization every evening. And every 6 hours as needed.    artificial tears (ISOPTO TEARS) 0.5 % ophthalmic solution Place 1 drop into both eyes as needed.    BD ULTRA-FINE CHARAN PEN NEEDLES 32 gauge x " Ndle " USE ONE 3 TIMES DAILY    carvedilol (COREG) 3.125 MG tablet Take 1 tablet (3.125 mg total) by mouth 2 (two) times daily.    FOLIC ACID/MULTIVIT-MIN/LUTEIN (CENTRUM SILVER ORAL) Take 1 tablet by mouth once daily.    insulin lispro (HUMALOG KWIKPEN) 100 unit/mL InPn pen Patient injects 18 units with breakfast, 18 units with lunch, and 18 units dinner.    lancets (ONETOUCH DELICA LANCETS) 33 gauge Misc 1 lancet by Misc.(Non-Drug; Combo Route) route 3 (three) times daily.    levalbuterol (XOPENEX) 0.63 mg/3 mL nebulizer solution USE ONE VIAL TWICE DAILY    losartan (COZAAR) 50 MG tablet Take 1 tablet (50 mg total) by mouth 2 (two) times daily with meals.    ONETOUCH VERIO Strp USE ONE 5 TIMES DAILY    pravastatin (PRAVACHOL) 20 MG tablet Take 1 tablet (20 mg total) by mouth once daily.    predniSONE (DELTASONE) 10 MG tablet Take 1 tablet (10 mg total) by mouth every morning. 1 tablet every morning    predniSONE (DELTASONE) 5 MG tablet     rivaroxaban (XARELTO) 15 mg Tab Take 1 tablet (15 mg total) by mouth daily with dinner or evening meal.           Clinical Reference Information           Your Vitals Were     Weight Last Period BMI          61.7 kg (136 lb) (LMP Unknown) 25.7 kg/m2        Allergies as of 3/17/2017     Tramadol      Immunizations Administered on Date of Encounter - 3/17/2017     None      Language Assistance Services     ATTENTION: Language assistance services are available, free of charge. Please call [phone].      ATENCIÓN: Si josefla simon, tiene a nair disposición servicios gratuitos de asistencia lingüística. Llame al [phone].     CELINE Ý: N?u b?n nói Ti?ng Vi?t, có các d?ch v? h? tr? ngôn ng? mi?n phí dành cho b?n. G?i s? [phone].         [facility] complies with applicable Federal civil rights laws and does not discriminate on the basis of race, color, national origin, age, disability, or sex.

## 2017-04-18 ENCOUNTER — TELEPHONE (OUTPATIENT)
Dept: INTERNAL MEDICINE | Facility: CLINIC | Age: 82
End: 2017-04-18

## 2017-04-18 DIAGNOSIS — J84.10 PULMONARY FIBROSIS: ICD-10-CM

## 2017-04-18 DIAGNOSIS — R29.6 RECURRENT FALLS: Primary | ICD-10-CM

## 2017-04-18 NOTE — TELEPHONE ENCOUNTER
----- Message from Ana Maria Barlow sent at 4/18/2017  2:20 PM CDT -----  Contact: loida daughter, call  [phone]  Loida is calling to let you know that her mom is need of a wheel chair, and is in need of a transporter as well,

## 2017-04-18 NOTE — TELEPHONE ENCOUNTER
Spoke to pt's daughter, Rowena. She stated that the pt needs a lightweight wheelchair to transport her. Pt was not able to make it to [place] this weekend, because she couldn't make the walk.   Rowena requesting that the order be faxed to her work at [phone], and to [employer].   Order entered.

## 2017-04-25 ENCOUNTER — CLINICAL SUPPORT (OUTPATIENT)
Dept: ELECTROPHYSIOLOGY | Facility: CLINIC | Age: 82
End: 2017-04-25
Payer: MEDICARE

## 2017-04-25 DIAGNOSIS — I44.2 CHB (COMPLETE HEART BLOCK): ICD-10-CM

## 2017-04-25 DIAGNOSIS — Z95.0 CARDIAC PACEMAKER IN SITU: ICD-10-CM

## 2017-04-25 PROCEDURE — 93294 REM INTERROG EVL PM/LDLS PM: CPT | Mod: ,,, | Performed by: INTERNAL MEDICINE

## 2017-04-27 ENCOUNTER — TELEPHONE (OUTPATIENT)
Dept: INTERNAL MEDICINE | Facility: CLINIC | Age: 82
End: 2017-04-27

## 2017-04-27 NOTE — TELEPHONE ENCOUNTER
Spoke to mellissa at [facility]. New faxed order form completed and faxed back with last progress note from October.

## 2017-04-27 NOTE — TELEPHONE ENCOUNTER
----- Message from Anny Schuler sent at 4/26/2017 10:19 AM CDT -----  Contact: Chula - daughter at [phone]  Daughter requesting a call back regarding pt's wheelchair. Daughter said the wrong wheelchair was sent.    #Daughter said not to leave a voice message if she doesn't answer the phone.

## 2017-04-28 ENCOUNTER — TELEPHONE (OUTPATIENT)
Dept: INTERNAL MEDICINE | Facility: CLINIC | Age: 82
End: 2017-04-28

## 2017-04-28 NOTE — TELEPHONE ENCOUNTER
----- Message from Guerita Souza sent at 4/28/2017 10:42 AM CDT -----  Contact: Nasreen/Daughter/ [phone]   Nasreen is calling to receive new orders for a Transporter. Please call and advise     Thank you

## 2017-04-28 NOTE — TELEPHONE ENCOUNTER
"Spoke to pt's daughter, Nasreen. I advised that this order was faxed to [facility] yesterday.    Nasreen stated that she has a friend that is taking an "activator" called Protandim. This is supposed to increase kidney functions. She stated that her friend's levels went up considerably over a period of a year.  Nasreen inquiring if ok to give the pt to try? And will it be ok to give with her diabetes?  Please advise.  "

## 2017-05-02 ENCOUNTER — TELEPHONE (OUTPATIENT)
Dept: INTERNAL MEDICINE | Facility: CLINIC | Age: 82
End: 2017-05-02

## 2017-05-02 NOTE — TELEPHONE ENCOUNTER
Spoke to pt's daughter, Nasreen. She stated that she had spoken to Angela with [facility]. She stated that Angela keep advising her that they have not received any information from Dr. Estrada's office, and that her calls have not been answered.  I advised that Dr. Estrada's last progress note and order has been sent.  Spoke to Pauly at [facility]. She clarified that the office notes from Dr. Estrada are outdated. They need more current information.  Spoke to Nasreen, again. I advised her of needing more current office notes. Advised that she contact either the pt's neurologist or pulmonologist that the pt has seen more recently for this.

## 2017-05-02 NOTE — TELEPHONE ENCOUNTER
----- Message from Anny Schuler sent at 5/2/2017  2:09 PM CDT -----  Contact: Rowena - daughter at [phone]  Daughter requesting a call back regarding updated orders for pt to receive a transporter.

## 2017-05-10 ENCOUNTER — TELEPHONE (OUTPATIENT)
Dept: ENDOCRINOLOGY | Facility: CLINIC | Age: 82
End: 2017-05-10

## 2017-05-10 NOTE — TELEPHONE ENCOUNTER
----- Message from More Mathew sent at 5/9/2017  4:01 PM CDT -----  Contact: pt daughter  Dina  [phone]  Nghia   -   Patient needs to see if a supplements called Probackin ,  Is it ok for the patient to take due to taking insulin - call back number [phone]  Thanks,

## 2017-05-10 NOTE — TELEPHONE ENCOUNTER
[email or web address]    Spoke with daughter. She states someone told her their kidney function improved on it. She just wants to know what you think

## 2017-05-17 ENCOUNTER — TELEPHONE (OUTPATIENT)
Dept: PULMONOLOGY | Facility: CLINIC | Age: 82
End: 2017-05-17

## 2017-05-17 DIAGNOSIS — J84.9 ILD (INTERSTITIAL LUNG DISEASE): ICD-10-CM

## 2017-05-17 DIAGNOSIS — J96.10 CHRONIC RESPIRATORY FAILURE, UNSPECIFIED WHETHER WITH HYPOXIA OR HYPERCAPNIA: Primary | ICD-10-CM

## 2017-05-17 NOTE — TELEPHONE ENCOUNTER
----- Message from Davina Singh sent at 5/12/2017  3:45 PM CDT -----  Contact: Daughter:   bert Ceron  teL:   [phone]   Daughter of pt. States she needs the updated order for a transporter instead of a wheelchair.   Asking for the nurse to call her, as per caller.

## 2017-05-17 NOTE — TELEPHONE ENCOUNTER
Mrs Garcia's  daughter Loli wants a transporter wheelchair for her mother who cannot walk very far, even with her oxygen on. This is a very lightweight wheelchair that can be easily lifted to the car and will enable pt to get out more and be more active. Dr Gordon signed it and I faxed it to [facility]. I called Loli and Mrs Garcia to let them know. Taylor Platt LPN.

## 2017-05-23 ENCOUNTER — TELEPHONE (OUTPATIENT)
Dept: ENDOCRINOLOGY | Facility: CLINIC | Age: 82
End: 2017-05-23

## 2017-05-23 NOTE — TELEPHONE ENCOUNTER
Spoke with patient    Diabetes Blood Sugar Phone Call Screening  Glucose Level and time taken:  Breakfast 308 Lunch time was 344    Blood sugar range at home over past few weeks: 171-386    Having any low blood sugar readings at home: no    Time of last meal, snack or non-diet drink (juice, soft drinks, sweet tea):  Chicken salad sandwich on wheat with a sprite zero     Time of last diabetes medication administration:  20 units of humalog at lunch     Current diabetes medications and doses:  Humalog 20 units with each meal   glucovance 2.5-500mg  Tid    Have you missed any medication doses within past 48 hours?  none    Any new symptoms such as abdominal pain, nausea, weakness, frequent urination, increased thirst, or blurry vision? Urinating more frequently since sugars have been higher    Any new recent medications this week such as: steroids (injections or pills) or antibiotics?  On prednisone 10 mg daily    Wants me to call her cell at  [phone]

## 2017-05-23 NOTE — TELEPHONE ENCOUNTER
----- Message from Dana Hoff sent at 5/23/2017  1:56 PM CDT -----  Contact: Self  Good afternoon,    Pt would like a call back regarding her blood sugar being high and she would like to know what should she do.    Pt can be reached at [phone]    Thank you!

## 2017-05-24 NOTE — TELEPHONE ENCOUNTER
Can you verify what the last 3-4 days of Fasting BG are. She will most likely need basal insulin. Can you see if she will be on prednisone 10 mg for some time? Let me know and I will send in basal insulin

## 2017-05-25 NOTE — TELEPHONE ENCOUNTER
Left message for patient to call us back.  Phone number was provided.    Need her fasting blood sugars since Monday.

## 2017-06-02 RX ORDER — CARVEDILOL 3.12 MG/1
3.12 TABLET ORAL 2 TIMES DAILY
Qty: 180 TABLET | Refills: 0 | Status: SHIPPED | OUTPATIENT
Start: 2017-06-02 | End: 2017-09-05 | Stop reason: SDUPTHER

## 2017-07-31 ENCOUNTER — TELEPHONE (OUTPATIENT)
Dept: INTERNAL MEDICINE | Facility: CLINIC | Age: 82
End: 2017-07-31

## 2017-07-31 ENCOUNTER — CLINICAL SUPPORT (OUTPATIENT)
Dept: ELECTROPHYSIOLOGY | Facility: CLINIC | Age: 82
End: 2017-07-31
Payer: MEDICARE

## 2017-07-31 DIAGNOSIS — I44.2 CHB (COMPLETE HEART BLOCK): ICD-10-CM

## 2017-07-31 DIAGNOSIS — Z95.0 CARDIAC PACEMAKER IN SITU: ICD-10-CM

## 2017-07-31 PROCEDURE — 93296 REM INTERROG EVL PM/IDS: CPT | Mod: PBBFAC | Performed by: INTERNAL MEDICINE

## 2017-07-31 PROCEDURE — 93294 REM INTERROG EVL PM/LDLS PM: CPT | Mod: ,,, | Performed by: INTERNAL MEDICINE

## 2017-07-31 NOTE — TELEPHONE ENCOUNTER
----- Message from Lauryn Henson sent at 7/31/2017  3:54 PM CDT -----  Contact: [phone]  daughter Chula Nath has had nose bleeds for the last 2 nights and daughter would like for you to call her or fit her in to see you in the next couple of days. Please try to call her back today.

## 2017-08-01 ENCOUNTER — TELEPHONE (OUTPATIENT)
Dept: UROGYNECOLOGY | Facility: CLINIC | Age: 82
End: 2017-08-01

## 2017-08-01 ENCOUNTER — OFFICE VISIT (OUTPATIENT)
Dept: INTERNAL MEDICINE | Facility: CLINIC | Age: 82
End: 2017-08-01
Payer: MEDICARE

## 2017-08-01 ENCOUNTER — LAB VISIT (OUTPATIENT)
Dept: LAB | Facility: HOSPITAL | Age: 82
End: 2017-08-01
Attending: INTERNAL MEDICINE
Payer: MEDICARE

## 2017-08-01 VITALS
HEIGHT: 60 IN | WEIGHT: 140 LBS | SYSTOLIC BLOOD PRESSURE: 132 MMHG | DIASTOLIC BLOOD PRESSURE: 65 MMHG | HEART RATE: 67 BPM | BODY MASS INDEX: 27.48 KG/M2 | TEMPERATURE: 98 F

## 2017-08-01 DIAGNOSIS — R04.0 EPISTAXIS: Primary | ICD-10-CM

## 2017-08-01 DIAGNOSIS — J84.9 ILD (INTERSTITIAL LUNG DISEASE): ICD-10-CM

## 2017-08-01 DIAGNOSIS — N18.30 CKD (CHRONIC KIDNEY DISEASE), STAGE III: ICD-10-CM

## 2017-08-01 DIAGNOSIS — R35.1 NOCTURIA: ICD-10-CM

## 2017-08-01 LAB
ANION GAP SERPL CALC-SCNC: 9 MMOL/L
BUN SERPL-MCNC: 36 MG/DL
CALCIUM SERPL-MCNC: 9 MG/DL
CHLORIDE SERPL-SCNC: 102 MMOL/L
CO2 SERPL-SCNC: 26 MMOL/L
CREAT SERPL-MCNC: 1.6 MG/DL
EST. GFR  (AFRICAN AMERICAN): 32.9 ML/MIN/1.73 M^2
EST. GFR  (NON AFRICAN AMERICAN): 28.6 ML/MIN/1.73 M^2
GLUCOSE SERPL-MCNC: 230 MG/DL
POTASSIUM SERPL-SCNC: 4.9 MMOL/L
SODIUM SERPL-SCNC: 137 MMOL/L

## 2017-08-01 PROCEDURE — 36415 COLL VENOUS BLD VENIPUNCTURE: CPT | Mod: PO

## 2017-08-01 PROCEDURE — 99999 PR PBB SHADOW E&M-EST. PATIENT-LVL III: CPT | Mod: PBBFAC,,, | Performed by: INTERNAL MEDICINE

## 2017-08-01 PROCEDURE — 1125F AMNT PAIN NOTED PAIN PRSNT: CPT | Mod: ,,, | Performed by: INTERNAL MEDICINE

## 2017-08-01 PROCEDURE — 99214 OFFICE O/P EST MOD 30 MIN: CPT | Mod: S$PBB,,, | Performed by: INTERNAL MEDICINE

## 2017-08-01 PROCEDURE — 1159F MED LIST DOCD IN RCRD: CPT | Mod: ,,, | Performed by: INTERNAL MEDICINE

## 2017-08-01 PROCEDURE — 80048 BASIC METABOLIC PNL TOTAL CA: CPT

## 2017-08-01 PROCEDURE — 1157F ADVNC CARE PLAN IN RCRD: CPT | Mod: ,,, | Performed by: INTERNAL MEDICINE

## 2017-08-01 RX ORDER — CYANOCOBALAMIN (VITAMIN B-12) 500 MCG
TABLET ORAL
COMMUNITY
End: 2018-08-16

## 2017-08-01 RX ORDER — MUPIROCIN 20 MG/G
OINTMENT TOPICAL 3 TIMES DAILY
Qty: 30 G | Refills: 0 | Status: SHIPPED | OUTPATIENT
Start: 2017-08-01 | End: 2017-12-29 | Stop reason: SDUPTHER

## 2017-08-01 NOTE — TELEPHONE ENCOUNTER
----- Message from Belkys Bonilla sent at 8/1/2017 11:33 AM CDT -----  Contact: [phone]  Nasreen / daughter  Dr Dominik Mchugh entered a referral for patient to be seen by [facility].  Patient's daughter states that she only wants to go to [facility].  Please call patient's daughter to schedule.    Nocturia [R35.1]    Thanks, Vida  7th Fl Referrals  [facility]

## 2017-08-01 NOTE — TELEPHONE ENCOUNTER
Spoke to pt and scheduled her for Dr. Calhoun's next available and informed her I'd send an appointment letter in the mail. Pt voiced understanding and call ended.

## 2017-08-01 NOTE — PROGRESS NOTES
Subjective:       Patient ID: Jahaira Garcia is a 88 y.o. female.    Chief Complaint: Epistaxis (the night of 7/30 and 7/31)    HPI     88-year-old female here for evaluation of epistaxis.  She had a couple of nose bleeds.  She is on O2 24/7.  She does not get nose bleeds all the time.  She is on xarelto for the last couple of months for atrial fibrillation.  She reports that when she goes to bed at night, when she went to put her night O2 on, she noticed it dripping.      Asking about CKD.    Complains of nocturia multiple times at night.    Review of Systems    Objective:      Physical Exam   Constitutional: She is oriented to person, place, and time. She appears well-developed and well-nourished.   HENT:   Head: Normocephalic and atraumatic.   Mouth/Throat: No oropharyngeal exudate.   Eyes: EOM are normal. Pupils are equal, round, and reactive to light. Right eye exhibits no discharge. Left eye exhibits no discharge. No scleral icterus.   Neck: Normal range of motion. Neck supple. No tracheal deviation present. No thyromegaly present.   Cardiovascular: Normal rate, regular rhythm and normal heart sounds.  Exam reveals no gallop and no friction rub.    No murmur heard.  Pulmonary/Chest: Effort normal and breath sounds normal. No respiratory distress. She has no wheezes. She has no rales. She exhibits no tenderness.   Abdominal: Soft. Bowel sounds are normal. She exhibits no distension and no mass. There is no tenderness. There is no rebound and no guarding.   Musculoskeletal: Normal range of motion. She exhibits no edema or tenderness.   Neurological: She is alert and oriented to person, place, and time.   Skin: Skin is warm and dry. No rash noted. No erythema. No pallor.   Psychiatric: She has a normal mood and affect. Her behavior is normal.   Vitals reviewed.      Assessment:       1. Epistaxis    2. CKD (chronic kidney disease), stage III    3. Nocturia    4. ILD (interstitial lung disease)        Plan:        1/4.  Prescribed the humidifier.  Gave Bactrim ointment to use in the nose as well.  2.  Advised patient to stay well-hydrated.  Avoid NSAIDs.  Check BMP today.  3.  Refer to urogynecology.

## 2017-08-02 ENCOUNTER — TELEPHONE (OUTPATIENT)
Dept: INTERNAL MEDICINE | Facility: CLINIC | Age: 82
End: 2017-08-02

## 2017-08-02 DIAGNOSIS — N18.30 CKD (CHRONIC KIDNEY DISEASE), STAGE III: Primary | ICD-10-CM

## 2017-08-02 NOTE — TELEPHONE ENCOUNTER
Electrolytes are normal.  Renal function is a little worse than it was 6 months ago.  Please be sure to get plenty of fluid.  Recheck in a week.

## 2017-08-07 ENCOUNTER — TELEPHONE (OUTPATIENT)
Dept: INTERNAL MEDICINE | Facility: CLINIC | Age: 82
End: 2017-08-07

## 2017-08-07 NOTE — TELEPHONE ENCOUNTER
----- Message from Melissa Masters LPN sent at 8/7/2017  3:49 PM CDT -----  Contact: Chula bales- [phone]  Pt saw Dr. Mchugh.  ----- Message -----  From: Kimi De Dios  Sent: 8/7/2017   3:18 PM  To: Martín Olivia Staff    Patient would like to get test results.  Name of test (lab, mammo, etc.):  labs  Date of test:  8-1  Ordering provider:   [facility]  Comments:

## 2017-08-08 ENCOUNTER — INITIAL CONSULT (OUTPATIENT)
Dept: UROGYNECOLOGY | Facility: CLINIC | Age: 82
End: 2017-08-08
Payer: MEDICARE

## 2017-08-08 ENCOUNTER — LAB VISIT (OUTPATIENT)
Dept: LAB | Facility: HOSPITAL | Age: 82
End: 2017-08-08
Attending: INTERNAL MEDICINE
Payer: MEDICARE

## 2017-08-08 VITALS
SYSTOLIC BLOOD PRESSURE: 100 MMHG | BODY MASS INDEX: 24.78 KG/M2 | DIASTOLIC BLOOD PRESSURE: 60 MMHG | HEIGHT: 62 IN | WEIGHT: 134.69 LBS

## 2017-08-08 DIAGNOSIS — R35.1 NOCTURIA: Primary | ICD-10-CM

## 2017-08-08 DIAGNOSIS — N95.2 VAGINAL ATROPHY: ICD-10-CM

## 2017-08-08 DIAGNOSIS — N18.30 CKD (CHRONIC KIDNEY DISEASE), STAGE III: Chronic | ICD-10-CM

## 2017-08-08 DIAGNOSIS — N18.30 CKD (CHRONIC KIDNEY DISEASE), STAGE III: ICD-10-CM

## 2017-08-08 DIAGNOSIS — R34 OLIGURIA: ICD-10-CM

## 2017-08-08 DIAGNOSIS — N39.3 FEMALE GENUINE STRESS INCONTINENCE: ICD-10-CM

## 2017-08-08 DIAGNOSIS — E11.65 TYPE 2 DIABETES MELLITUS WITH HYPERGLYCEMIA, WITH LONG-TERM CURRENT USE OF INSULIN: Chronic | ICD-10-CM

## 2017-08-08 DIAGNOSIS — N17.9 ACUTE RENAL FAILURE, UNSPECIFIED ACUTE RENAL FAILURE TYPE: ICD-10-CM

## 2017-08-08 DIAGNOSIS — K59.09 CHRONIC CONSTIPATION: ICD-10-CM

## 2017-08-08 DIAGNOSIS — Z79.4 TYPE 2 DIABETES MELLITUS WITH HYPERGLYCEMIA, WITH LONG-TERM CURRENT USE OF INSULIN: Chronic | ICD-10-CM

## 2017-08-08 LAB
ANION GAP SERPL CALC-SCNC: 11 MMOL/L
BUN SERPL-MCNC: 44 MG/DL
CALCIUM SERPL-MCNC: 9.4 MG/DL
CHLORIDE SERPL-SCNC: 105 MMOL/L
CO2 SERPL-SCNC: 26 MMOL/L
CREAT SERPL-MCNC: 1.6 MG/DL
EST. GFR  (AFRICAN AMERICAN): 32.9 ML/MIN/1.73 M^2
EST. GFR  (NON AFRICAN AMERICAN): 28.6 ML/MIN/1.73 M^2
GLUCOSE SERPL-MCNC: 161 MG/DL
POTASSIUM SERPL-SCNC: 4.7 MMOL/L
SODIUM SERPL-SCNC: 142 MMOL/L

## 2017-08-08 PROCEDURE — 51701 INSERT BLADDER CATHETER: CPT | Mod: S$PBB,,, | Performed by: OBSTETRICS & GYNECOLOGY

## 2017-08-08 PROCEDURE — 80048 BASIC METABOLIC PNL TOTAL CA: CPT

## 2017-08-08 PROCEDURE — 1125F AMNT PAIN NOTED PAIN PRSNT: CPT | Mod: ,,, | Performed by: OBSTETRICS & GYNECOLOGY

## 2017-08-08 PROCEDURE — 36415 COLL VENOUS BLD VENIPUNCTURE: CPT

## 2017-08-08 PROCEDURE — 99205 OFFICE O/P NEW HI 60 MIN: CPT | Mod: S$PBB,25,, | Performed by: OBSTETRICS & GYNECOLOGY

## 2017-08-08 PROCEDURE — 99999 PR PBB SHADOW E&M-EST. PATIENT-LVL IV: CPT | Mod: PBBFAC,,, | Performed by: OBSTETRICS & GYNECOLOGY

## 2017-08-08 PROCEDURE — 1159F MED LIST DOCD IN RCRD: CPT | Mod: ,,, | Performed by: OBSTETRICS & GYNECOLOGY

## 2017-08-08 PROCEDURE — 1157F ADVNC CARE PLAN IN RCRD: CPT | Mod: ,,, | Performed by: OBSTETRICS & GYNECOLOGY

## 2017-08-08 NOTE — LETTER
August 8, 2017        Joceline Resendiz MD  [address] [facility] [place] [address]             [facility]  [address]  [place] [address]  Phone: [phone]   Patient: Jahaira Garcia   MR Number: [id number]   YOB: 1928   Date of Visit: 8/8/2017       Dear Dr. Resendiz:    Thank you for referring Jahaira Garcia to me for evaluation. Below are the relevant portions of my assessment and plan of care.            If you have questions, please do not hesitate to call me. I look forward to following Jahaira along with you.    Sincerely,      Cornelius Calhoun MD           CC  Dominik Mchugh MD

## 2017-08-08 NOTE — PATIENT INSTRUCTIONS
1)  Nocturia (nighttime urination):   --control diabetes  --stop fluids 2 hours before bed.    --no water by bed  --If have leg swelling:  Elevate feet above chest x 1 hour before bed to get excess fluid off.  Can also use support hose (knee highs).    --start pelvic floor PT:  Katey Leslie or other pelvic floor PTs ([facility]): (p) [phone]. (f) [phone]. Established patients call:  [phone]. Call in a few days to make appt.   --hydrate well:  At least 3-4 bottles of water/day (12 oz)    2)  Concern for oliguria:  --get BMP today  --talk with PCP about meds that may need to be adjusted due to decreased function  --avoid sodium containing foods (frozen dinners)  --hydrate well:  At least 3-4 bottles of water/day (12 oz)    3)  Vaginal atrophy (dryness):    --consider vaginal estrogen use once kidney function addressed    4)  Constipation:  --use miralax sparingly  --Controlling constipation may help bladder urgency/leakage and fiber may better control cholesterol and blood glucose.  Start daily fiber.  Take 1 tsp of fiber powder (psyllium or other sugar-free powder).  Mix in 8 oz of water.  Take x 3-5 days.  Then, increase fiber by 1 tsp every 3-5 days until stool is easy to pass.  Stop and continue at that dose.      5)  RTC 2-3 months.

## 2017-08-08 NOTE — PROGRESS NOTES
[facility] - GYNECOLOGY  [address]  [place] [address]    Jahaira Garcia  [id number]  11/27/1928 August 8, 2017    Consulting Physician: Self, Aaareferral   GYN:none  Primary M.D.: Gayathri Resendiz MD    Chief Complaint   Patient presents with    Nocturia     pt states she get up 2-3x a night     Urinary Incontinence     pt states she leak urine when she cough       HPI:     1)  UI:  (+) MOIRA .   (--) UUI  X 5years.  (+) pads:6/day, usually minimum wetness and 1/night usually minimum wetness.  Daytime frequency: Q 3-4 hours.  Nocturia: Yes: 3-4/night x 1 year--only wakes up with urinary urgency.  Can have trouble falling back asleep due to neuropathy.  Sips of water just prior to bedtime with medicaiton.  Otherwise limits fluids 5 hours prior to bedtime/no water by bed.  (--) dysuria,  (--) hematuria,  (--) frequent UTIs.  (--) complete bladder emptying.     2)  POP:  Absent. Symptoms:(--)    (--) vaginal bleeding. (--) vaginal discharge. (--) sexually active.  (--)  Vaginal dryness.  (--) vaginal estrogen use.     3)  BM:  (+) constipation/straining. Ok if takes miralax--takes daily.    (--) chronic diarrhea. (+) hematochezia is she is constipated.  (--) fecal incontinence.  (--) fecal smearing/urgency.  (+) complete evacuation.  Better with miralax    4)  DM:  Fasting blood glucose 120-200's.  Managed by Dr. Agrawal  Lab Results   Component Value Date    HGBA1C 7.6 (H) 08/23/2016     5) pulmonary fibrosis-- on home O2 x years.  Stable.     Past Medical History  Past Medical History:   Diagnosis Date    Arthritis     Basal cell carcinoma     Complete heart block 2016    Diabetes mellitus type II     GERD (gastroesophageal reflux disease)     Hiatal hernia     HTN (hypertension)     Hyperlipidemia     Lung disease, interstitial     OA (osteoarthritis)     Pulmonary fibrosis     PVC (premature ventricular contraction)     Steroid long-term use 11/8/2012      Past Surgical  History  Past Surgical History:   Procedure Laterality Date    APPENDECTOMY      CATARACT EXTRACTION EXTRACAPSULAR W/ INTRAOCULAR LENS IMPLANTATION      X 2    CHOLECYSTECTOMY      INSERT / REPLACE / REMOVE PACEMAKER  2016    [facility] [place]    TONSILLECTOMY, ADENOIDECTOMY      TOTAL KNEE ARTHROPLASTY     LSC keith  xlap/appy     Hysterectomy: No    Past Ob History     x 3.    Largest infant weight: 6#  unknown FAVD. unknown episiotomy.      Gynecologic History  LMP: No LMP recorded (lmp unknown). Patient is postmenopausal.  Age of menarche: 13  Age of menopause:   Menstrual history: normal  Pap test: .  History of abnormal paps: No.  History of STIs:  No  Mammogram: Date of last: -- normal per patient report done at .   Colonoscopy: Date of last: .  Result:  Polyps per patient report.    DEXA:  Date of last: 2016.  Result:  Normal spine and hip BMD. FRAX calculation does not support treatment for osteoporosis. However if steroid dose of prednisone greater than or equal to 7.5 could consider treatment since in moderate risk of fracture based on FRAX at hip. BMD unchanged since .    Family History  Family History   Problem Relation Age of Onset    Tuberculosis Mother     Tuberculosis Father       Colon CA: No  Breast CA: No  GYN CA: Daughter -- uterine cancer   CA: No    Social History  History   Smoking Status    Never Smoker   Smokeless Tobacco    Never Used   .    History   Alcohol Use No   .    History   Drug Use No   .  The patient is .  Resides in [place] [address].  Employment status: retired clerical worker.    Allergies  Review of patient's allergies indicates:   Allergen Reactions    Tramadol      Confusion and sleeping and unsteady.       Medications  Current Outpatient Prescriptions on File Prior to Visit   Medication Sig Dispense Refill    acetaminophen (TYLENOL) 325 MG tablet Take 1-2 tablets (325-650 mg total) by mouth nightly as needed for Pain.       "albuterol-ipratropium 2.5mg-0.5mg/3mL (DUO-NEB) 0.5 mg-3 mg(2.5 mg base)/3 mL nebulizer solution Take 3 mLs by nebulization every evening. And every 6 hours as needed.  0    artificial tears (ISOPTO TEARS) 0.5 % ophthalmic solution Place 1 drop into both eyes as needed.      BD ULTRA-FINE CHARAN PEN NEEDLES 32 gauge x 5/32" Ndle USE ONE 3 TIMES DAILY 300 each 0    carvedilol (COREG) 3.125 MG tablet Take 1 tablet (3.125 mg total) by mouth 2 (two) times daily. 180 tablet 0    FOLIC ACID/MULTIVIT-MIN/LUTEIN (CENTRUM SILVER ORAL) Take 1 tablet by mouth once daily.      insulin lispro (HUMALOG KWIKPEN) 100 unit/mL InPn pen Patient injects 18 units with breakfast, 18 units with lunch, and 18 units dinner. 3 Box 3    levalbuterol (XOPENEX) 0.63 mg/3 mL nebulizer solution USE ONE VIAL TWICE DAILY 504 mL 0    losartan (COZAAR) 50 MG tablet Take 1 tablet (50 mg total) by mouth 2 (two) times daily with meals. 90 tablet 3    melatonin 1 mg Tab Take by mouth.      ONETOUCH DELICA LANCETS 33 gauge Misc USE THREE TIMES DAILY 200 each 3    ONETOUCH VERIO Strp USE ONE 5 TIMES DAILY 450 each 12    pravastatin (PRAVACHOL) 20 MG tablet Take 1 tablet (20 mg total) by mouth once daily. 90 tablet 3    rivaroxaban (XARELTO) 15 mg Tab Take 1 tablet (15 mg total) by mouth daily with dinner or evening meal. 30 tablet 11    mupirocin (BACTROBAN) 2 % ointment Apply topically 3 (three) times daily. 30 g 0    predniSONE (DELTASONE) 10 MG tablet Take 1 tablet (10 mg total) by mouth every morning. 1 tablet every morning 90 tablet 11    predniSONE (DELTASONE) 5 MG tablet        No current facility-administered medications on file prior to visit.        Review of Systems A 14 point ROS was reviewed with pertinent positives as noted above in the history of present illness.      Constitutional: negative  Eyes: negative  Endocrine: negative  Gastrointestinal: negative  Cardiovascular: negative  Respiratory: negative  Allergic/Immunologic: " "negative  Integumentary: negative  Psychiatric: negative  Musculoskeletal: negative   Ear/Nose/Throat: negative  Neurologic: negative  Genitourinary: SEE HPI  Hematologic/Lymphatic: negative   Breast: negative    Urogynecologic Exam  /60 (BP Location: Left arm, Patient Position: Sitting)   Ht 5' 2" (1.575 m)   Wt 61.1 kg (134 lb 11.2 oz)   LMP  (LMP Unknown)   BMI 24.64 kg/m²     GENERAL APPEARANCE:  The patient is well-developed, well-nourished.   Neck:  Supple with no thyromegaly, no carotid bruits.  Heart:  Regular rate and rhythm, no murmurs, rubs or gallops.  Lungs:  Clear.  No CVA tenderness.  Abdomen:  Soft, nontender, nondistended, no hepatosplenomegaly.  Incisions:  RLQ appy well-healed    PELVIC:    External genitalia:  Normal Bartholins, Skenes and labia bilaterally.  Slight hypopigmentation of B inner labia minora, concerning for lichen process.  No focal lesions.   Urethra:  No caruncle, diverticulum or masses.  (--) hypermobility.    Vagina:  Atrophy (+) , no bladder masses or tender, no discharge.    Cervix:  normal appearance  Uterus: normal size, contour, position, consistency, mobility, non-tender  Adnexa: Not palpable.    POP-Q:    Deferred.  No obvious POP present with valsalva.     NEUROLOGIC:  Cranial nerves 2 through 12 intact.  Strength 5/5.  DTRs 2+ lower extremities.  S2 through 4 normal.  Sacral reflexes intact.    EXT: CHAO, 2+ pulses bilaterally, no C/C/E    COUGH STRESS TEST:  negative  KEGEL: 1 /5    RECTAL:    External:  Normal, (--) hemorrhoids, (--) dovetailing.   Internal: deferred    PVR: 40 mL (could not void; last void 2.5 hours ago--concerning for oliguria)    Impression    1. Nocturia    2. CKD (chronic kidney disease), stage III    3. Acute renal failure, unspecified acute renal failure type    4. Type 2 diabetes mellitus with hyperglycemia, with long-term current use of insulin    5. Oliguria    6. Vaginal atrophy    7. Chronic constipation    8. Female genuine " stress incontinence        Initial Plan  The patient was counseled regarding these issues. The patient was given a summary sheet containing each of these issues with possible options for evaluation and management. When appropriate, we also reviewed computer-generated diagrams specific to their diagnoses..  All questions were addressed to the patient's satisfaction.    1)  Nocturia (nighttime urination):   --control diabetes  --stop fluids 2 hours before bed.    --no water by bed  --If have leg swelling:  Elevate feet above chest x 1 hour before bed to get excess fluid off.  Can also use support hose (knee highs).    --start pelvic floor PT:  Katey Leslie or other pelvic floor PTs ([facility]): (p) [phone]. (f) [phone]. Established patients call:  [phone]. Call in a few days to make appt.   --hydrate well:  At least 3-4 bottles of water/day (12 oz)    2)  Concern for oliguria in setting of CKD 3:  --get BMP today  --talk with PCP about meds that may need to be adjusted due to decreased function  --avoid sodium containing foods (frozen dinners)  --hydrate well:  At least 3-4 bottles of water/day (12 oz)    3)  Vaginal atrophy (dryness):    --consider vaginal estrogen use once kidney function addressed    4)  Constipation:  --use miralax sparingly  --Controlling constipation may help bladder urgency/leakage and fiber may better control cholesterol and blood glucose.  Start daily fiber.  Take 1 tsp of fiber powder (psyllium or other sugar-free powder).  Mix in 8 oz of water.  Take x 3-5 days.  Then, increase fiber by 1 tsp every 3-5 days until stool is easy to pass.  Stop and continue at that dose.      5)  Female stress incontinence, mild  --start pelvic floor PT    6)  RTC 2-3 months.     Approximately 60 min were spent in consult, 90 % in discussion.     Thank you for requesting consultation of your patient.  I look forward to participating in their care.    Cornelius Calhoun  Female  Pelvic Medicine and Reconstructive Surgery  [facility] [place] [facility], [place]

## 2017-08-09 ENCOUNTER — TELEPHONE (OUTPATIENT)
Dept: INTERNAL MEDICINE | Facility: CLINIC | Age: 82
End: 2017-08-09

## 2017-08-09 DIAGNOSIS — N28.9 DECREASED RENAL FUNCTION: Primary | ICD-10-CM

## 2017-08-09 LAB — BACTERIA UR CULT: NO GROWTH

## 2017-08-09 NOTE — TELEPHONE ENCOUNTER
Electrolytes are normal.  Kidney function has not improved back to baseline it was at 6-10 months ago.  I'm going to refer you to nephrology.  Released to patient portal.

## 2017-08-10 ENCOUNTER — TELEPHONE (OUTPATIENT)
Dept: UROGYNECOLOGY | Facility: CLINIC | Age: 82
End: 2017-08-10

## 2017-08-10 NOTE — TELEPHONE ENCOUNTER
----- Message from Cornelius Calhoun MD sent at 8/10/2017 12:01 PM CDT -----  Please let the patient know urine C&S was negative for infection.  Thanks!

## 2017-08-11 ENCOUNTER — TELEPHONE (OUTPATIENT)
Dept: INTERNAL MEDICINE | Facility: CLINIC | Age: 82
End: 2017-08-11

## 2017-08-11 ENCOUNTER — TELEPHONE (OUTPATIENT)
Dept: UROGYNECOLOGY | Facility: CLINIC | Age: 82
End: 2017-08-11

## 2017-08-11 NOTE — TELEPHONE ENCOUNTER
Informed Chula she need to contact her PCP for the blood work results. She verbalized understanding call ended.

## 2017-08-11 NOTE — TELEPHONE ENCOUNTER
----- Message from Kyliebaljinder Good sent at 8/11/2017  1:07 PM CDT -----  Contact: Rowena Dawn, [phone]  Rowena called requesting to discuss kidney function test results 08-08-17

## 2017-08-14 ENCOUNTER — TELEPHONE (OUTPATIENT)
Dept: INTERNAL MEDICINE | Facility: CLINIC | Age: 82
End: 2017-08-14

## 2017-08-14 NOTE — TELEPHONE ENCOUNTER
Spoke to daughter, Rowena. I reviewed kidney level results from Dr. Mchugh. I advised that the pt needs to see a nephrologist.   She stated that she had not heard from anyone to schedule.  Dr. Mchugh's referral was sent to referral coordinator to schedule.

## 2017-08-14 NOTE — TELEPHONE ENCOUNTER
----- Message from Kenan Dodson sent at 8/14/2017  9:18 AM CDT -----  Contact: Rowena daughter [phone]  Patient would like to get test results.  Name of test (lab, mammo, etc.):  Lab   Date of test:  08/08  Ordering provider:   Where was the test performed:    Comments:

## 2017-08-14 NOTE — TELEPHONE ENCOUNTER
----- Message from Caleb Mcclain sent at 8/14/2017  9:54 AM CDT -----  Contact: Rowena/daughter   Patient daughter would like to know is reading for either kidneys or one or is that average of both?    Please advise

## 2017-08-14 NOTE — TELEPHONE ENCOUNTER
----- Message from Zabrina Booth sent at 8/14/2017 10:50 AM CDT -----  Regarding: RE: mei  Forwarded to Vida Bonilla. She normally keeps track of referrals scheduled for Dr Mchugh.    Thanks  ----- Message -----  From: Melissa Masters LPN  Sent: 8/14/2017  10:20 AM  To: [facility] Referral Coordinators  Subject: mei Gerber-  Referral to nephrology. It was originally ordered by Dr. Mchugh.  Daughter Rowena stated that she has not heard from anyone.   Please call Rowena to schedule at [phone].    Thanks

## 2017-08-15 ENCOUNTER — TELEPHONE (OUTPATIENT)
Dept: INTERNAL MEDICINE | Facility: CLINIC | Age: 82
End: 2017-08-15

## 2017-08-15 ENCOUNTER — TELEPHONE (OUTPATIENT)
Dept: UROGYNECOLOGY | Facility: CLINIC | Age: 82
End: 2017-08-15

## 2017-08-15 NOTE — TELEPHONE ENCOUNTER
----- Message from Cornelius Calhoun MD sent at 8/14/2017  7:01 PM CDT -----  Regarding: FW: Call patient and daughter and review BMP  Can you please call patient's daughter and remind her that their PCP wants them to make appt with nephrology ([facility])?  Order in EPIC per Dr. Dominik Mchugh, PCP.  If daughter would like help making that appt for her mother, can you please help them do so?  Thanks!  ----- Message -----  From: Cornelius Calhoun MD  Sent: 8/8/2017   7:57 PM  To: Cornelius Calhoun MD  Subject: Call patient and daughter and review BMP

## 2017-08-15 NOTE — TELEPHONE ENCOUNTER
Called pt to inform her that she need to make appointment for nephrologist as voiced by her PCP. N/A left message

## 2017-08-15 NOTE — TELEPHONE ENCOUNTER
----- Message from Dominik Mchugh MD sent at 8/15/2017  3:32 PM CDT -----  Contact: [phone]  Chula /daughter  Patient was seen for epistaxis.  This was brought up as a side point in the conversation.  Patient needs follow-up with primary care physician in nephrology.  This lab does apply to both kidneys.  She does need to be seen by nephrology as ordered.    Dominik Mchugh MD    ----- Message -----  From: Suzy Gan  Sent: 8/15/2017   2:57 PM  To: Dominik Mchugh MD        ----- Message -----  From: Belkys Bonilla  Sent: 8/15/2017   2:40 PM  To: Jeison Lehman Staff    Dr Dominik Mchugh has referred patient to Nephrology related to lab results from 8/8.  Patient's daughter is wanting to know are these the results from both kidney's or one.  Please call patient's daughter to advise. (Patient's daughter prefers a phone call over my chart message for this response)    Decreased renal function [N28.9]    Thanks, Vida

## 2017-08-16 ENCOUNTER — TELEPHONE (OUTPATIENT)
Dept: INTERNAL MEDICINE | Facility: CLINIC | Age: 82
End: 2017-08-16

## 2017-08-16 ENCOUNTER — HOSPITAL ENCOUNTER (OUTPATIENT)
Facility: HOSPITAL | Age: 82
Discharge: HOME HEALTH | End: 2017-08-18
Attending: EMERGENCY MEDICINE | Admitting: HOSPITALIST
Payer: MEDICARE

## 2017-08-16 ENCOUNTER — NURSE TRIAGE (OUTPATIENT)
Dept: ADMINISTRATIVE | Facility: CLINIC | Age: 82
End: 2017-08-16

## 2017-08-16 DIAGNOSIS — K64.8 INTERNAL HEMORRHOID, BLEEDING: Primary | ICD-10-CM

## 2017-08-16 DIAGNOSIS — K62.5 RECTAL BLEEDING: ICD-10-CM

## 2017-08-16 LAB
ABO + RH BLD: NORMAL
ALBUMIN SERPL BCP-MCNC: 3.2 G/DL
ALP SERPL-CCNC: 41 U/L
ALT SERPL W/O P-5'-P-CCNC: 18 U/L
ANION GAP SERPL CALC-SCNC: 10 MMOL/L
APTT BLDCRRT: 21.8 SEC
AST SERPL-CCNC: 20 U/L
BASOPHILS # BLD AUTO: 0.01 K/UL
BASOPHILS NFR BLD: 0.1 %
BILIRUB SERPL-MCNC: 0.7 MG/DL
BLD GP AB SCN CELLS X3 SERPL QL: NORMAL
BUN SERPL-MCNC: 42 MG/DL
CALCIUM SERPL-MCNC: 9.3 MG/DL
CHLORIDE SERPL-SCNC: 103 MMOL/L
CO2 SERPL-SCNC: 25 MMOL/L
CREAT SERPL-MCNC: 1.6 MG/DL
CRP SERPL-MCNC: 11.5 MG/L
DIFFERENTIAL METHOD: ABNORMAL
EOSINOPHIL # BLD AUTO: 0 K/UL
EOSINOPHIL NFR BLD: 0 %
ERYTHROCYTE [DISTWIDTH] IN BLOOD BY AUTOMATED COUNT: 14.5 %
EST. GFR  (AFRICAN AMERICAN): 32.9 ML/MIN/1.73 M^2
EST. GFR  (NON AFRICAN AMERICAN): 28.6 ML/MIN/1.73 M^2
FERRITIN SERPL-MCNC: 43 NG/ML
GLUCOSE SERPL-MCNC: 129 MG/DL
HCT VFR BLD AUTO: 29.2 %
HGB BLD-MCNC: 9.2 G/DL
INR PPP: 1
LYMPHOCYTES # BLD AUTO: 2.7 K/UL
LYMPHOCYTES NFR BLD: 26.4 %
MCH RBC QN AUTO: 27.9 PG
MCHC RBC AUTO-ENTMCNC: 31.5 G/DL
MCV RBC AUTO: 89 FL
MONOCYTES # BLD AUTO: 0.5 K/UL
MONOCYTES NFR BLD: 4.5 %
NEUTROPHILS # BLD AUTO: 7.1 K/UL
NEUTROPHILS NFR BLD: 68.3 %
PLATELET # BLD AUTO: 334 K/UL
PMV BLD AUTO: 9.1 FL
POTASSIUM SERPL-SCNC: 4.6 MMOL/L
PROT SERPL-MCNC: 6.2 G/DL
PROTHROMBIN TIME: 10.8 SEC
RBC # BLD AUTO: 3.3 M/UL
SODIUM SERPL-SCNC: 138 MMOL/L
WBC # BLD AUTO: 10.31 K/UL

## 2017-08-16 PROCEDURE — 93010 ELECTROCARDIOGRAM REPORT: CPT | Mod: ,,, | Performed by: INTERNAL MEDICINE

## 2017-08-16 PROCEDURE — 82728 ASSAY OF FERRITIN: CPT

## 2017-08-16 PROCEDURE — 99284 EMERGENCY DEPT VISIT MOD MDM: CPT | Mod: ,,, | Performed by: EMERGENCY MEDICINE

## 2017-08-16 PROCEDURE — 86900 BLOOD TYPING SEROLOGIC ABO: CPT

## 2017-08-16 PROCEDURE — 86140 C-REACTIVE PROTEIN: CPT

## 2017-08-16 PROCEDURE — G0378 HOSPITAL OBSERVATION PER HR: HCPCS

## 2017-08-16 PROCEDURE — 96360 HYDRATION IV INFUSION INIT: CPT

## 2017-08-16 PROCEDURE — 25000003 PHARM REV CODE 250: Performed by: STUDENT IN AN ORGANIZED HEALTH CARE EDUCATION/TRAINING PROGRAM

## 2017-08-16 PROCEDURE — 86901 BLOOD TYPING SEROLOGIC RH(D): CPT

## 2017-08-16 PROCEDURE — 85730 THROMBOPLASTIN TIME PARTIAL: CPT

## 2017-08-16 PROCEDURE — 80053 COMPREHEN METABOLIC PANEL: CPT

## 2017-08-16 PROCEDURE — 85610 PROTHROMBIN TIME: CPT

## 2017-08-16 PROCEDURE — 85025 COMPLETE CBC W/AUTO DIFF WBC: CPT

## 2017-08-16 PROCEDURE — 46600 DIAGNOSTIC ANOSCOPY SPX: CPT

## 2017-08-16 PROCEDURE — 99220 PR INITIAL OBSERVATION CARE,LEVL III: CPT | Mod: ,,, | Performed by: PHYSICIAN ASSISTANT

## 2017-08-16 PROCEDURE — 99285 EMERGENCY DEPT VISIT HI MDM: CPT | Mod: 25

## 2017-08-16 RX ORDER — CARVEDILOL 3.12 MG/1
3.12 TABLET ORAL 2 TIMES DAILY
Status: DISCONTINUED | OUTPATIENT
Start: 2017-08-16 | End: 2017-08-16

## 2017-08-16 RX ADMIN — SODIUM CHLORIDE 1000 ML: 0.9 INJECTION, SOLUTION INTRAVENOUS at 05:08

## 2017-08-16 NOTE — TELEPHONE ENCOUNTER
Spoke with Pt's daughter, loida. Dr. Estrada agrees with the On call nurse to send her to the ER for evaluation if she has a rectal bleed.    Daughter verbalized understanding.

## 2017-08-16 NOTE — ED NOTES
ED Resident Max WHITMAN informed of pt BP remains hypertensive at 183/72 - states will collaborate with attending physician and put orders in.

## 2017-08-16 NOTE — ED NOTES
Patient resting in stretcher and is in NAD at this time. Pt is awake alert and oriented x4, VSS, respirations even and unlabored. Pt denies pain at this time. Famly at bedside. Pt and family aware of POC. Bed low and locked with side rails up x2, call bell in pt reach.

## 2017-08-16 NOTE — ED NOTES
Pt assisted with bedpan use - UOP approx 400cc, urine clear and yellow - small amount of bright red blood to tissue when cleaning rectal area - ED Resident Max WHITMAN and Dr Marmolejo informed.

## 2017-08-16 NOTE — ED PROVIDER NOTES
"Encounter Date: 8/16/2017    SCRIBE #1 NOTE: I, Zeus Rivera, am scribing for, and in the presence of,  Dr. Merino. I have scribed the following portions of the note - the Resident attestation.       History     Chief Complaint   Patient presents with    Rectal Bleeding     1 episode oif bright red blood today while trying to have a BM. Denies pain. On xarelto. HX: pulmonary fibrosis- on 3L home O2.      88F with ID-DM2, Pulmonary Fibrosis, HTN, CKD3, with Pacemaker, presents today with BRBPR.   Pt has been experiencing diarrhea for 2-3 days with 3x/day episodes. No blood noticed previously. Pt is chronically constipated and has been taking Miralax, but switched to Fiber due to dr perez. Blood today had blood clot (3-4 " diameter) and bright red blood was noticed in toilet and paper. Pt continued to bleed afterward and wore a pad, blood has ceased since, per daughter. Pt is on Xarelto blood thinner only (early ED note stating she was also on pradaxa was inaccurate.) No subjective fevers and no fever on arrival. Pt does not feel pain now, nor when she passed the bloody stool. Pt states that her caliber of stool changes frequently. Her last colonoscopy revealed Polyps, and she was to return in 5 years, outside [facility], family unclear on timeframe however it sounds to be at least 5 years at this point since her last colonoscopy. Pt also states she has felt week this past week. Takes Prednisone 5mg BID (Pulmonary Fibrosis).     Pt denies nausea or vomiting, no CP, no HA or dizziness. Denies fevers/night sweats. No increase in SOB from baseline (3L home O2).           Review of patient's allergies indicates:   Allergen Reactions    Tramadol      Confusion and sleeping and unsteady.     Past Medical History:   Diagnosis Date    Arthritis     Basal cell carcinoma     Complete heart block 2016    Diabetes mellitus type II     GERD (gastroesophageal reflux disease)     Hiatal hernia     HTN (hypertension)     " Hyperlipidemia     Lung disease, interstitial     OA (osteoarthritis)     Pulmonary fibrosis     PVC (premature ventricular contraction)     Steroid long-term use 11/8/2012     Past Surgical History:   Procedure Laterality Date    APPENDECTOMY      CATARACT EXTRACTION EXTRACAPSULAR W/ INTRAOCULAR LENS IMPLANTATION      X 2    CHOLECYSTECTOMY      INSERT / REPLACE / REMOVE PACEMAKER  06/2016    [facility]    TONSILLECTOMY, ADENOIDECTOMY      TOTAL KNEE ARTHROPLASTY       Family History   Problem Relation Age of Onset    Tuberculosis Mother     Tuberculosis Father      Social History   Substance Use Topics    Smoking status: Never Smoker    Smokeless tobacco: Never Used    Alcohol use No     Review of Systems   Constitutional: Positive for fatigue.   Eyes: Negative for visual disturbance.   Respiratory: Positive for shortness of breath. Negative for chest tightness.         On 3L home O2   Cardiovascular: Negative for chest pain.   Gastrointestinal: Positive for blood in stool.   Genitourinary: Negative for difficulty urinating, dysuria and hematuria.   Skin: Negative for color change.   Allergic/Immunologic: Negative for immunocompromised state.   Neurological: Negative for dizziness.   Hematological: Bruises/bleeds easily.   Psychiatric/Behavioral: Negative for confusion.       Physical Exam     Initial Vitals [08/16/17 1510]   BP Pulse Resp Temp SpO2   (!) 178/86 64 16 97.7 °F (36.5 °C) 100 %      MAP       116.67         Physical Exam    Constitutional: She appears well-developed and well-nourished. She is not diaphoretic. No distress.   HENT:   Head: Normocephalic and atraumatic.   Eyes: No scleral icterus.   Neck: No thyromegaly present. No tracheal deviation present.   Cardiovascular: Normal rate.   No murmur heard.  Pulmonary/Chest: She has rales.   Good air movement throughout R and L lung fields.  Crackles throughout all lung fields.    Musculoskeletal: She exhibits edema.   +1 edema in lower  legs. Home stockings present.   Neurological: She is alert and oriented to person, place, and time.   Skin: Skin is warm and dry. Capillary refill takes less than 2 seconds.   Keratosis throughout. Most dense on backside.  Multiple ecchymosis on legs.   Psychiatric: She has a normal mood and affect. Thought content normal.         ED Course   Procedures  Labs Reviewed   CBC W/ AUTO DIFFERENTIAL - Abnormal; Notable for the following:        Result Value    RBC 3.30 (*)     Hemoglobin 9.2 (*)     Hematocrit 29.2 (*)     MCHC 31.5 (*)     MPV 9.1 (*)     All other components within normal limits   COMPREHENSIVE METABOLIC PANEL   PROTIME-INR   APTT   FERRITIN   C-REACTIVE PROTEIN   TYPE & SCREEN             Medical Decision Making:   Initial Assessment:   Polyps   Differential Diagnosis:   Bleeding 2/2 blood thinners (rivaroxaban)  Rectal fissure  CRC  Hemorrhoids  Diverticulitis   AVM    ED Management:  Pt was given 1L fluid.   BP initially presented with , but currently stable at .  Pt awake and alert.   Pt XRAY showed no acute obstruction.  At 1630 Rectal exam revealed no additional blood in undergarments. No obvious tears or hemorrhoids.   Hb found to be 9.2, last measured 6m ago at 11.7. Type and screen ordered.   GI consulted 1945, referred to CRS since BRBPR.   CRS contacted at 1950, in surgery, will come to see patient between [id number].   2140- pt doing well. , permissive hypertension due to GI bleed.   2030- CRS finished their evaluation. Pt states that even if CRS found CRC, she would not want to undergo surgery at her age. CRS suggested she make an apt as an outpt, as she is currently stable and has not bleed since the event at 1300 today.   Max Bravo MD  Internal Medicine PGY-1  [phone] 8/16/17 @2040            Scribe Attestation:   Scribe #1: I performed the above scribed service and the documentation accurately describes the services I performed. I attest to the accuracy of the  note.    Attending Attestation:   Physician Attestation Statement for Resident:  As the supervising MD   Physician Attestation Statement: I have personally seen and examined this patient.   I agree with the above history. -: Rectal bleeding today that is painless. Exam is benign. Will check labs, anticipate admission    As the supervising MD I agree with the above PE.    As the supervising MD I agree with the above treatment, course, plan, and disposition.          Physician Attestation for Scribe:  Physician Attestation Statement for Scribe #1: I, Dr. Merino, reviewed documentation, as scribed by Zeus Rivera in my presence, and it is both accurate and complete.                 ED Course     Clinical Impression:   The encounter diagnosis was Rectal bleeding.     Probably due to internal hemorrhoids. Discovered on rectal exam. Pt is still unsteady and weak, with Hb of 9.2. Placing pt in Obs for the night.                            Max Bravo MD  Resident  08/17/17 3658

## 2017-08-16 NOTE — TELEPHONE ENCOUNTER
----- Message from Janina Becerril sent at 8/16/2017  2:04 PM CDT -----  Contact: [facility] On [facility] [phone] Pt Daughter Rowena is calling about her mothers DANICA Barlow spoke to an on call nurse and the on call nurse recommend they take EMS. Pt daughter would like to know if this is also what Dr Estrada thinks is best for the pt as well. Please advise.

## 2017-08-16 NOTE — TELEPHONE ENCOUNTER
----- Message from Janina Becerril sent at 8/16/2017  1:51 PM CDT -----  Contact: Pt Daughter Rowena [phone]  Pt daughter states that the pt is having rectal bleeding and would like to speak to Dr Estrada or the nurse. This just started today.   She is being transferred to an On Call nurse.

## 2017-08-16 NOTE — TELEPHONE ENCOUNTER
"    Reason for Disposition   Shock suspected (e.g., cold/pale/clammy skin, too weak to stand, low BP, rapid pulse)    Answer Assessment - Initial Assessment Questions  1. APPEARANCE of BLOOD: "What color is it?" "Is it passed separately, on the surface of the stool, or mixed in with the stool?"      Once this afternoon, one clot on nightgown - 3-4" diameter spot.   2. AMOUNT: "How much blood was passed?"       Caller not with pt. Pt currently laying down   3. FREQUENCY: "How many times has blood been passed with the stools?"      Once   4. ONSET: "When was the blood first seen in the stools?" (Days or weeks)      No   5. DIARRHEA: "Is there also some diarrhea?" If so, ask: "How many diarrhea stools were passed in past 24 hours?"     Diarrhea all week   6. CONSTIPATION: "Do you have constipation?" If so, "How bad is it?"     No   7. RECURRENT SYMPTOMS: "Have you had blood in your stools before?" If so, ask: "When was the last time?" and "What happened that time?"      No   8. BLOOD THINNERS: "Do you take any blood thinners?" (e.g., Coumadin/warfarin, Pradaxa/dabigatran, aspirin)    Yes, pradaxa and xarelto   9. OTHER SYMPTOMS: "Do you have any other symptoms?"  (e.g., abdominal pain, vomiting, dizziness, fever)     No    Protocols used: ST RECTAL BLEEDING-A-AH    Lot of back pain yest. rec EMS as pt too weak to stand per daughter. Daughter requesting PCP be notified. Before EMS called. Spoke with Janina at dr brooke office re GIB. Urgent message sent ot RN?MD to call pt. Daughter notified. Call back with questions.   "

## 2017-08-16 NOTE — ED TRIAGE NOTES
Pt c/o rectal bleeding - reports x1 episode of diarrhea with bright red blood. Family reports pt having diarrhea for a few days. Pt also c/o right hip pain onset x2 days ago - denies pain at this time. Pt also c/o generalized weakness.  Denies CP or SOB. Denies N/V . Denies headache dizziness or blurred vision.    Patient identifiers verified and correct for Jahaira Garcia.    LOC: The patient is awake, alert and oriented x 4. Pt is speaking appropriately, no slurred speech.  APPEARANCE: Patient resting and in no acute distress. Pt is clean and well groomed. No JVD visible. Pt reports pain level of 0. Family at bedside.  SKIN: Skin is warm dry and intact, and color is pallor. No tenting observed and capillary refill <3 seconds. No clubbing noted to nail beds. No breakdown or brusing visible and mucus membranes moist and acyanotic. Lidocaine patch noted to right posterior thigh - placed by family today PTA for hip pain.  MUSCULOSKELETAL: Pt c/o right hip pain x2 days (denies at this time) and generalized weakness. Full range of motion present in all extremities. Hand  equal and leg strength strong +2 bilaterally.  RESPIRATORY: Airway is open and patent. Respirations-unlabored, regular rate, equal bilaterally on inspiration and expiration. No accessory muscle use noted. Crackles noted throughout all lung fields - most prominent in MARY - pt denies SOB. Pt on 3L O2 via nasal cannula on arrival - pt reports currently prescribed continuous 3L O2 via nasal cannula at home - SpO2 maintained at 100% on 3L - continuous pulse ox monitor applied to pt.  CARDIAC: Patient has regular heart rate and rhythm.  No peripheral edema noted, and patient has no c/o chest pain. Pt placed on cont cardiac monitor and automatic BP cuff.  ABDOMEN: Soft and non-tender to palpation with no distention noted. Normoactive bowel sounds X4 quadrants. Pt reports diarrhea x2 days with x1 episode of stool with bright red blood this morning.    NEUROLOGIC: Eyes open spontaneously and facial expression symmetrical. Pt behavior appropriate to situation, and pt follows commands.  Pt reports sensation present in all extremities when touched with a finger. No s/s of ischemic stroke. PERRLA  : No complaints of frequency, burning, urgency or blood in the urine.

## 2017-08-17 PROBLEM — K64.8 INTERNAL HEMORRHOID, BLEEDING: Chronic | Status: ACTIVE | Noted: 2017-08-16

## 2017-08-17 LAB
ANION GAP SERPL CALC-SCNC: 10 MMOL/L
BASOPHILS # BLD AUTO: 0.01 K/UL
BASOPHILS NFR BLD: 0.1 %
BUN SERPL-MCNC: 38 MG/DL
CALCIUM SERPL-MCNC: 8.8 MG/DL
CHLORIDE SERPL-SCNC: 107 MMOL/L
CO2 SERPL-SCNC: 24 MMOL/L
CREAT SERPL-MCNC: 1.5 MG/DL
DIFFERENTIAL METHOD: ABNORMAL
EOSINOPHIL # BLD AUTO: 0.1 K/UL
EOSINOPHIL NFR BLD: 1.1 %
ERYTHROCYTE [DISTWIDTH] IN BLOOD BY AUTOMATED COUNT: 14.6 %
EST. GFR  (AFRICAN AMERICAN): 35.6 ML/MIN/1.73 M^2
EST. GFR  (NON AFRICAN AMERICAN): 30.9 ML/MIN/1.73 M^2
ESTIMATED AVG GLUCOSE: 183 MG/DL
GLUCOSE SERPL-MCNC: 129 MG/DL
HBA1C MFR BLD HPLC: 8 %
HCT VFR BLD AUTO: 26.9 %
HCT VFR BLD AUTO: 27.2 %
HGB BLD-MCNC: 8.4 G/DL
HGB BLD-MCNC: 8.4 G/DL
LYMPHOCYTES # BLD AUTO: 2.8 K/UL
LYMPHOCYTES NFR BLD: 33 %
MAGNESIUM SERPL-MCNC: 1.7 MG/DL
MCH RBC QN AUTO: 28.6 PG
MCHC RBC AUTO-ENTMCNC: 31.2 G/DL
MCV RBC AUTO: 92 FL
MONOCYTES # BLD AUTO: 0.8 K/UL
MONOCYTES NFR BLD: 10 %
NEUTROPHILS # BLD AUTO: 4.6 K/UL
NEUTROPHILS NFR BLD: 55.2 %
PHOSPHATE SERPL-MCNC: 3.6 MG/DL
PLATELET # BLD AUTO: 331 K/UL
PMV BLD AUTO: 8.9 FL
POCT GLUCOSE: 104 MG/DL (ref 70–110)
POCT GLUCOSE: 116 MG/DL (ref 70–110)
POCT GLUCOSE: 176 MG/DL (ref 70–110)
POTASSIUM SERPL-SCNC: 4.2 MMOL/L
RBC # BLD AUTO: 2.94 M/UL
SODIUM SERPL-SCNC: 141 MMOL/L
WBC # BLD AUTO: 8.37 K/UL

## 2017-08-17 PROCEDURE — 36415 COLL VENOUS BLD VENIPUNCTURE: CPT

## 2017-08-17 PROCEDURE — 83735 ASSAY OF MAGNESIUM: CPT

## 2017-08-17 PROCEDURE — G0378 HOSPITAL OBSERVATION PER HR: HCPCS

## 2017-08-17 PROCEDURE — 84100 ASSAY OF PHOSPHORUS: CPT

## 2017-08-17 PROCEDURE — 25000003 PHARM REV CODE 250: Performed by: PHYSICIAN ASSISTANT

## 2017-08-17 PROCEDURE — 85025 COMPLETE CBC W/AUTO DIFF WBC: CPT

## 2017-08-17 PROCEDURE — 85018 HEMOGLOBIN: CPT

## 2017-08-17 PROCEDURE — 85014 HEMATOCRIT: CPT

## 2017-08-17 PROCEDURE — 80048 BASIC METABOLIC PNL TOTAL CA: CPT

## 2017-08-17 PROCEDURE — 94799 UNLISTED PULMONARY SVC/PX: CPT

## 2017-08-17 PROCEDURE — 25000242 PHARM REV CODE 250 ALT 637 W/ HCPCS: Performed by: PHYSICIAN ASSISTANT

## 2017-08-17 PROCEDURE — 63600175 PHARM REV CODE 636 W HCPCS: Performed by: PHYSICIAN ASSISTANT

## 2017-08-17 PROCEDURE — 99226 PR SUBSEQUENT OBSERVATION CARE,LEVEL III: CPT | Mod: ,,, | Performed by: PHYSICIAN ASSISTANT

## 2017-08-17 PROCEDURE — 83036 HEMOGLOBIN GLYCOSYLATED A1C: CPT

## 2017-08-17 PROCEDURE — 94640 AIRWAY INHALATION TREATMENT: CPT

## 2017-08-17 RX ORDER — HYDRALAZINE HYDROCHLORIDE 50 MG/1
50 TABLET, FILM COATED ORAL EVERY 6 HOURS PRN
Status: DISCONTINUED | OUTPATIENT
Start: 2017-08-17 | End: 2017-08-18 | Stop reason: HOSPADM

## 2017-08-17 RX ORDER — LOPERAMIDE HYDROCHLORIDE 2 MG/1
2 CAPSULE ORAL
Status: DISCONTINUED | OUTPATIENT
Start: 2017-08-17 | End: 2017-08-18 | Stop reason: HOSPADM

## 2017-08-17 RX ORDER — GLUCAGON 1 MG
1 KIT INJECTION
Status: DISCONTINUED | OUTPATIENT
Start: 2017-08-17 | End: 2017-08-18 | Stop reason: HOSPADM

## 2017-08-17 RX ORDER — POLYETHYLENE GLYCOL 3350 17 G/17G
17 POWDER, FOR SOLUTION ORAL 3 TIMES DAILY PRN
Status: DISCONTINUED | OUTPATIENT
Start: 2017-08-17 | End: 2017-08-18 | Stop reason: HOSPADM

## 2017-08-17 RX ORDER — CARVEDILOL 3.12 MG/1
3.12 TABLET ORAL 2 TIMES DAILY
Status: DISCONTINUED | OUTPATIENT
Start: 2017-08-17 | End: 2017-08-18 | Stop reason: HOSPADM

## 2017-08-17 RX ORDER — ACETAMINOPHEN 325 MG/1
650 TABLET ORAL EVERY 6 HOURS PRN
Status: DISCONTINUED | OUTPATIENT
Start: 2017-08-17 | End: 2017-08-18 | Stop reason: HOSPADM

## 2017-08-17 RX ORDER — PRAVASTATIN SODIUM 20 MG/1
20 TABLET ORAL DAILY
Status: DISCONTINUED | OUTPATIENT
Start: 2017-08-17 | End: 2017-08-18 | Stop reason: HOSPADM

## 2017-08-17 RX ORDER — LIDOCAINE 50 MG/G
1 PATCH TOPICAL DAILY
Status: DISCONTINUED | OUTPATIENT
Start: 2017-08-17 | End: 2017-08-18 | Stop reason: HOSPADM

## 2017-08-17 RX ORDER — ONDANSETRON 8 MG/1
8 TABLET, ORALLY DISINTEGRATING ORAL EVERY 8 HOURS PRN
Status: DISCONTINUED | OUTPATIENT
Start: 2017-08-17 | End: 2017-08-18 | Stop reason: HOSPADM

## 2017-08-17 RX ORDER — IPRATROPIUM BROMIDE AND ALBUTEROL SULFATE 2.5; .5 MG/3ML; MG/3ML
3 SOLUTION RESPIRATORY (INHALATION) EVERY 4 HOURS PRN
Status: DISCONTINUED | OUTPATIENT
Start: 2017-08-17 | End: 2017-08-18 | Stop reason: HOSPADM

## 2017-08-17 RX ORDER — LEVALBUTEROL INHALATION SOLUTION 0.63 MG/3ML
0.63 SOLUTION RESPIRATORY (INHALATION) EVERY 12 HOURS
Status: DISCONTINUED | OUTPATIENT
Start: 2017-08-17 | End: 2017-08-18 | Stop reason: HOSPADM

## 2017-08-17 RX ORDER — SODIUM CHLORIDE 9 MG/ML
1000 INJECTION, SOLUTION INTRAVENOUS ONCE
Status: COMPLETED | OUTPATIENT
Start: 2017-08-17 | End: 2017-08-17

## 2017-08-17 RX ORDER — PREDNISONE 5 MG/1
5 TABLET ORAL 2 TIMES DAILY
Status: DISCONTINUED | OUTPATIENT
Start: 2017-08-17 | End: 2017-08-18 | Stop reason: HOSPADM

## 2017-08-17 RX ORDER — IBUPROFEN 200 MG
24 TABLET ORAL
Status: DISCONTINUED | OUTPATIENT
Start: 2017-08-17 | End: 2017-08-18 | Stop reason: HOSPADM

## 2017-08-17 RX ORDER — IBUPROFEN 200 MG
16 TABLET ORAL
Status: DISCONTINUED | OUTPATIENT
Start: 2017-08-17 | End: 2017-08-18 | Stop reason: HOSPADM

## 2017-08-17 RX ORDER — INSULIN ASPART 100 [IU]/ML
5 INJECTION, SOLUTION INTRAVENOUS; SUBCUTANEOUS
Status: DISCONTINUED | OUTPATIENT
Start: 2017-08-17 | End: 2017-08-18 | Stop reason: HOSPADM

## 2017-08-17 RX ORDER — AMOXICILLIN 250 MG
1 CAPSULE ORAL 2 TIMES DAILY
Status: DISCONTINUED | OUTPATIENT
Start: 2017-08-17 | End: 2017-08-17

## 2017-08-17 RX ORDER — LOSARTAN POTASSIUM 50 MG/1
50 TABLET ORAL 2 TIMES DAILY WITH MEALS
Status: DISCONTINUED | OUTPATIENT
Start: 2017-08-17 | End: 2017-08-17

## 2017-08-17 RX ORDER — INSULIN ASPART 100 [IU]/ML
1-10 INJECTION, SOLUTION INTRAVENOUS; SUBCUTANEOUS
Status: DISCONTINUED | OUTPATIENT
Start: 2017-08-17 | End: 2017-08-18 | Stop reason: HOSPADM

## 2017-08-17 RX ORDER — PROMETHAZINE HYDROCHLORIDE 25 MG/1
25 TABLET ORAL EVERY 6 HOURS PRN
Status: DISCONTINUED | OUTPATIENT
Start: 2017-08-17 | End: 2017-08-18 | Stop reason: HOSPADM

## 2017-08-17 RX ORDER — RAMELTEON 8 MG/1
8 TABLET ORAL NIGHTLY PRN
Status: DISCONTINUED | OUTPATIENT
Start: 2017-08-17 | End: 2017-08-18 | Stop reason: HOSPADM

## 2017-08-17 RX ADMIN — ACETAMINOPHEN 650 MG: 325 TABLET ORAL at 10:08

## 2017-08-17 RX ADMIN — PREDNISONE 5 MG: 5 TABLET ORAL at 08:08

## 2017-08-17 RX ADMIN — PSYLLIUM HUSK 1 PACKET: 3.4 POWDER ORAL at 08:08

## 2017-08-17 RX ADMIN — INSULIN ASPART 8 UNITS: 100 INJECTION, SOLUTION INTRAVENOUS; SUBCUTANEOUS at 05:08

## 2017-08-17 RX ADMIN — PRAVASTATIN SODIUM 20 MG: 20 TABLET ORAL at 08:08

## 2017-08-17 RX ADMIN — INSULIN ASPART 5 UNITS: 100 INJECTION, SOLUTION INTRAVENOUS; SUBCUTANEOUS at 08:08

## 2017-08-17 RX ADMIN — SODIUM CHLORIDE 1000 ML: 0.9 INJECTION, SOLUTION INTRAVENOUS at 03:08

## 2017-08-17 RX ADMIN — LIDOCAINE 1 PATCH: 50 PATCH TOPICAL at 02:08

## 2017-08-17 RX ADMIN — CARVEDILOL 3.12 MG: 3.12 TABLET, FILM COATED ORAL at 08:08

## 2017-08-17 RX ADMIN — RIVAROXABAN 15 MG: 15 TABLET, FILM COATED ORAL at 04:08

## 2017-08-17 RX ADMIN — CARVEDILOL 3.12 MG: 3.12 TABLET, FILM COATED ORAL at 09:08

## 2017-08-17 RX ADMIN — INSULIN ASPART 5 UNITS: 100 INJECTION, SOLUTION INTRAVENOUS; SUBCUTANEOUS at 04:08

## 2017-08-17 RX ADMIN — PREDNISONE 5 MG: 5 TABLET ORAL at 09:08

## 2017-08-17 RX ADMIN — LEVALBUTEROL HYDROCHLORIDE 0.63 MG: 0.63 SOLUTION RESPIRATORY (INHALATION) at 09:08

## 2017-08-17 RX ADMIN — INSULIN ASPART 5 UNITS: 100 INJECTION, SOLUTION INTRAVENOUS; SUBCUTANEOUS at 11:08

## 2017-08-17 NOTE — ASSESSMENT & PLAN NOTE
88yoWF with multiple medical comorbidities with an episode of BRBPR at home with some reported light-headedness.    -AFVSS since arrival to ED, Hg 9.2. No further bloody BM since around 1300 today. On exam, no active bleeding although some clotted blood on anoscopy at her internal hemorrhoids. I placed a piece of quick-clot via the anoscope. May be irritation of her hemorrhoids secondary to diarrhea. I discussed fiber supplementation and staying well hydrated, sitz baths as needed. We discussed her history of multiple polyps on her colonoscopies in 2002 and 2003. She states that, even if we found colon or rectal cancer, that she would adamantly refuse treatment. She can follow up with us as an outpatient for further evaluation of her hemorrhoids if she wishes. Instructed patient to be vigilant for repeated bloody bowel movements associated with other symptoms such as syncope/lightheadedness. If she needs to be admitted, would recommend admission to hospitalist given her comorbidities. Do not think she needs to be admitted from CRS standpoint.

## 2017-08-17 NOTE — PROGRESS NOTES
"[facility] Hospital Medicine  Progress Note    Patient Name: Jahaira Garcia  MRN: [id number]  Patient Class: OP- Observation   Admission Date: 8/16/2017  Length of Stay: 0 days  Attending Physician: Hallie Huggins MD  Primary Care Provider: Gayathri Resendiz MD    [facility] Medicine Team: [facility] MED F Brenda Mclaughlin PA-C    Subjective:     Principal Problem:Rectal bleeding    HPI:  Patient is an 88 year old lady with a h/o DM II, pulmonary fibrosis, ILD, HTN, CKD III, A fib s/p PPM.  Patient states that she has had diarrhea for the past 2-3 days about three times a day.  She has chronic constipation and recently switched from Miralax to fiber.  Today, she states that she felt like she needed to have a BM and instead had a large blood clot (approximately 3-4" in diameter) and BRB on the toilet paper and in the toilet.  She continued to bleed afterwards.  She has not had any more bleeding since about 13:00.  She denies abdominal and rectal pain, melena, hematochezia, hematemesis, chest pain, worsened SOB, dizziness, palpitations, fever/chills, N/V.    Hospital Course:  Pt admitted observation with history of BRBPR prior to admission with some reported lightheadedness. She has never had this before.  Blood consent signed and type & screen ordered.  CRS consulted and on exam, no active bleeding although some clotted blood on anoscopy at her internal hemorrhoids. CRS placed a piece of quick-clot via the anoscope. It was thought that there may be irritation of her hemorrhoids secondary to diarrhea. Fiber supplementation and staying well hydrated, sitz baths as needed was discussed with the patient. Pt's history of multiple polyps on her colonoscopies in 2002 and 2003 was also discussed. Pt states that, even if we found colon or rectal cancer, that she would adamantly refuse treatment. She can follow up with CRS as an outpatient for further evaluation of her hemorrhoids if pt desires.  "     Interval History: no acute events overnight; no further episodes of BRB per patient.  Pt also reports lightheadedness has resolved but she is weak and does use a walker at home.  She has never had HHC.  Pt would like to eat.    Review of Systems   Constitutional: Negative for activity change, appetite change, chills, fatigue, fever and unexpected weight change.   HENT: Negative for congestion, rhinorrhea, sore throat, trouble swallowing and voice change.    Respiratory: Negative for cough, chest tightness, shortness of breath and wheezing.    Cardiovascular: Negative for chest pain, palpitations and leg swelling.   Gastrointestinal: Positive for anal bleeding (resolved day 2). Negative for abdominal distention, abdominal pain, blood in stool, constipation, diarrhea, nausea and vomiting.   Genitourinary: Negative for dysuria, flank pain, frequency, hematuria and urgency.   Musculoskeletal: Negative for arthralgias, back pain, joint swelling and myalgias.   Skin: Negative for color change and rash.   Neurological: Positive for weakness. Negative for dizziness, syncope, facial asymmetry, speech difficulty, light-headedness, numbness and headaches.   Psychiatric/Behavioral: Negative for agitation, confusion, hallucinations and suicidal ideas.     Objective:     Vital Signs (Most Recent):  Temp: 98.2 °F (36.8 °C) (08/17/17 1133)  Pulse: 65 (08/17/17 1133)  Resp: 18 (08/17/17 1133)  BP: (!) 171/72 (08/17/17 1133)  SpO2: 99 % (08/17/17 1133) Vital Signs (24h Range):  Temp:  [97.5 °F (36.4 °C)-98.2 °F (36.8 °C)] 98.2 °F (36.8 °C)  Pulse:  [60-76] 65  Resp:  [16-20] 18  SpO2:  [96 %-100 %] 99 %  BP: (142-191)/(63-86) 171/72     Weight: 63.5 kg (140 lb)  Body mass index is 25.61 kg/m².    Intake/Output Summary (Last 24 hours) at 08/17/17 1145  Last data filed at 08/17/17 0400   Gross per 24 hour   Intake                0 ml   Output              200 ml   Net             -200 ml      Physical Exam   Constitutional: She is  oriented to person, place, and time. She appears well-developed and well-nourished. No distress.   HENT:   Head: Normocephalic and atraumatic.   Eyes: Pupils are equal, round, and reactive to light.   Neck: Normal range of motion. Neck supple. Carotid bruit is not present.   Cardiovascular: Normal rate and regular rhythm.    Pulmonary/Chest: Effort normal. No respiratory distress. She has no wheezes. She has no rales.   Abdominal: Soft. Bowel sounds are normal. There is no splenomegaly or hepatomegaly.   Musculoskeletal: Normal range of motion. She exhibits no edema or deformity.   Neurological: She is alert and oriented to person, place, and time. No cranial nerve deficit or sensory deficit.   Skin: Skin is warm and dry.   Psychiatric: She has a normal mood and affect.       Significant Labs: All pertinent labs within the past 24 hours have been reviewed.    Significant Imaging: I have reviewed all pertinent imaging results/findings within the past 24 hours.    Assessment/Plan:      * Rectal bleeding    Internal hemorrhoid, bleeding    - Appreciate CRS's assistance.  Patient continues to have no active bleeding.  Likely secondary to hemorrhoid irritation from diarrhea.  Quick clot placed via anoscope by CRS.  - H/o multiple polyps on remote colonoscopies; however patient states that she would refuse treatment if colon or rectal cancer was found.  - H/H 9.2->8.4/29.2 from 11.7/36.6 in February.  Will trend.  - Sitz baths, bowel regimen, fiber supplement.        CKD (chronic kidney disease), stage IV    - Creatinine has been elevated over the past month to 1.6, GFR down to 28.6 from 1.2, 40.4 six months ago.  - Will hold losartan.    - Patient has seen urogyn and has been referred to nephrology as outpatient.          Atrial fibrillation    Cardiac pacemaker in situ  Chronic anticoagulation therapy    - Continue Xarelto 15mg daily.  - [id number] VASc score of at least five for HTN, age, DM II, and sex.          HLD  (hyperlipidemia)    - Continue pravastatin 20mg daily.          Essential hypertension    - Will hold losartan 50mg BID, see above.  - PRN hydralazine.          Pulmonary fibrosis    Interstitial lung disease  Chronic respiratory failure with hypoxia  Home O2    - Continue home O2 3L.  - PRN Duonebs, Xopenex BID.  - PT/OT ordered        Uncontrolled type 2 diabetes mellitus with stage 4 chronic kidney disease, with long-term current use of insulin    - Diabetic diet, SSI.  Insulin aspart 5 units TID.            VTE Risk Mitigation         Ordered     rivaroxaban tablet 15 mg  With dinner     Route:  Oral        08/17/17 0123     Place sequential compression device  Until discontinued      08/17/17 0123     Medium Risk of VTE  Once      08/17/17 0123     Place MILAD hose  Until discontinued      08/17/17 0123              Brenda Mclaughlin PA-C  Department of Hospital Medicine   [facility]

## 2017-08-17 NOTE — PLAN OF CARE
Problem: Patient Care Overview  Goal: Plan of Care Review  Outcome: Ongoing (interventions implemented as appropriate)  Pt is AA&O x 3. VSS. Denies pain. Monitoring bleeding if necessary. Pt oriented to room. Resting between care. Safety maintained with bed in lowest position and call light within reach.

## 2017-08-17 NOTE — ASSESSMENT & PLAN NOTE
Internal hemorrhoid, bleeding    - Appreciate CRS's assistance.  Patient continues to have no active bleeding.  Likely secondary to hemorrhoid irritation from diarrhea.  Quick clot placed via anoscope by CRS.  - H/o multiple polyps on remote colonoscopies; however patient states that she would refuse treatment if colon or rectal cancer was found.  - H/H 9.2/29.2 from 11.7/36.6 in February.  Will trend.  - Sitz baths, bowel regimen, fiber supplement.

## 2017-08-17 NOTE — SUBJECTIVE & OBJECTIVE
"Past Medical History:   Diagnosis Date    Arthritis     Basal cell carcinoma     Complete heart block 2016    Diabetes mellitus type II     GERD (gastroesophageal reflux disease)     Hiatal hernia     HTN (hypertension)     Hyperlipidemia     Lung disease, interstitial     OA (osteoarthritis)     Pulmonary fibrosis     PVC (premature ventricular contraction)     Steroid long-term use 11/8/2012       Past Surgical History:   Procedure Laterality Date    APPENDECTOMY      CATARACT EXTRACTION EXTRACAPSULAR W/ INTRAOCULAR LENS IMPLANTATION      X 2    CHOLECYSTECTOMY      INSERT / REPLACE / REMOVE PACEMAKER  06/2016    [facility]    TONSILLECTOMY, ADENOIDECTOMY      TOTAL KNEE ARTHROPLASTY         Review of patient's allergies indicates:   Allergen Reactions    Tramadol      Confusion and sleeping and unsteady.       No current facility-administered medications on file prior to encounter.      Current Outpatient Prescriptions on File Prior to Encounter   Medication Sig    acetaminophen (TYLENOL) 325 MG tablet Take 1-2 tablets (325-650 mg total) by mouth nightly as needed for Pain.    albuterol-ipratropium 2.5mg-0.5mg/3mL (DUO-NEB) 0.5 mg-3 mg(2.5 mg base)/3 mL nebulizer solution Take 3 mLs by nebulization every evening. And every 6 hours as needed.    artificial tears (ISOPTO TEARS) 0.5 % ophthalmic solution Place 1 drop into both eyes as needed.    BD ULTRA-FINE CHARAN PEN NEEDLES 32 gauge x 5/32" Ndle USE ONE 3 TIMES DAILY    carvedilol (COREG) 3.125 MG tablet Take 1 tablet (3.125 mg total) by mouth 2 (two) times daily.    FOLIC ACID/MULTIVIT-MIN/LUTEIN (CENTRUM SILVER ORAL) Take 1 tablet by mouth once daily.    insulin lispro (HUMALOG KWIKPEN) 100 unit/mL InPn pen Patient injects 18 units with breakfast, 18 units with lunch, and 18 units dinner.    levalbuterol (XOPENEX) 0.63 mg/3 mL nebulizer solution USE ONE VIAL TWICE DAILY    losartan (COZAAR) 50 MG tablet Take 1 tablet (50 mg total) by " mouth 2 (two) times daily with meals.    melatonin 1 mg Tab Take by mouth.    mupirocin (BACTROBAN) 2 % ointment Apply topically 3 (three) times daily.    ONETOUCH DELICA LANCETS 33 gauge Misc USE THREE TIMES DAILY    ONETOUCH VERIO Strp USE ONE 5 TIMES DAILY    pravastatin (PRAVACHOL) 20 MG tablet Take 1 tablet (20 mg total) by mouth once daily.    predniSONE (DELTASONE) 10 MG tablet Take 1 tablet (10 mg total) by mouth every morning. 1 tablet every morning    predniSONE (DELTASONE) 5 MG tablet     rivaroxaban (XARELTO) 15 mg Tab Take 1 tablet (15 mg total) by mouth daily with dinner or evening meal.    UNABLE TO FIND Take by mouth once daily. medication name: protandim     Family History     Problem Relation (Age of Onset)    Tuberculosis Mother, Father        Social History Main Topics    Smoking status: Never Smoker    Smokeless tobacco: Never Used    Alcohol use No    Drug use: No    Sexual activity: No     Review of Systems   Constitutional: Negative for activity change, appetite change, chills, fatigue, fever and unexpected weight change.   HENT: Negative for congestion, rhinorrhea, sore throat, trouble swallowing and voice change.    Eyes: Negative for visual disturbance.   Respiratory: Negative for cough, choking, chest tightness, shortness of breath and wheezing.    Cardiovascular: Negative for chest pain, palpitations and leg swelling.   Gastrointestinal: Positive for anal bleeding. Negative for abdominal distention, abdominal pain, blood in stool, constipation, diarrhea, nausea and vomiting.   Endocrine: Negative for cold intolerance, heat intolerance, polydipsia and polyuria.   Genitourinary: Negative for dysuria, flank pain, frequency, hematuria and urgency.   Musculoskeletal: Negative for arthralgias, back pain, joint swelling and myalgias.   Skin: Negative for color change and rash.   Neurological: Negative for dizziness, seizures, syncope, facial asymmetry, speech difficulty, weakness,  light-headedness, numbness and headaches.   Hematological: Negative for adenopathy. Does not bruise/bleed easily.   Psychiatric/Behavioral: Negative for agitation, confusion, hallucinations and suicidal ideas.     Objective:     Vital Signs (Most Recent):  Temp: 97.8 °F (36.6 °C) (08/17/17 0109)  Pulse: 70 (08/17/17 0109)  Resp: 16 (08/17/17 0109)  BP: (!) 142/65 (08/17/17 0109)  SpO2: 98 % (08/17/17 0109) Vital Signs (24h Range):  Temp:  [97.7 °F (36.5 °C)-97.8 °F (36.6 °C)] 97.8 °F (36.6 °C)  Pulse:  [60-70] 70  Resp:  [16] 16  SpO2:  [96 %-100 %] 98 %  BP: (142-191)/(65-86) 142/65     Weight: 63.5 kg (140 lb)  Body mass index is 25.61 kg/m².    Physical Exam   Constitutional: She is oriented to person, place, and time. She appears well-developed and well-nourished. No distress.   HENT:   Head: Normocephalic and atraumatic.   Eyes: Pupils are equal, round, and reactive to light.   Neck: Neck supple. Carotid bruit is not present. No thyromegaly present.   Cardiovascular: Normal rate and regular rhythm.  Exam reveals no gallop.    No murmur heard.  Pulmonary/Chest: Effort normal. No respiratory distress. She has no wheezes. She has rales.   Abdominal: Soft. Bowel sounds are normal. She exhibits no distension and no mass. There is no splenomegaly or hepatomegaly. There is no tenderness.   Musculoskeletal: Normal range of motion. She exhibits no edema or deformity.   Neurological: She is alert and oriented to person, place, and time. No cranial nerve deficit or sensory deficit.   Skin: Skin is warm and dry. No rash noted.   Psychiatric: She has a normal mood and affect.        Significant Labs: All pertinent labs within the past 24 hours have been reviewed.    Significant Imaging: I have reviewed all pertinent imaging results/findings within the past 24 hours.

## 2017-08-17 NOTE — ASSESSMENT & PLAN NOTE
Cardiac pacemaker in situ  Chronic anticoagulation therapy    - Continue Xarelto 15mg daily.  - [id number] VASc score of at least five for HTN, age, DM II, and sex.

## 2017-08-17 NOTE — ASSESSMENT & PLAN NOTE
Internal hemorrhoid, bleeding    - Appreciate CRS's assistance.  Patient continues to have no active bleeding.  Likely secondary to hemorrhoid irritation from diarrhea.  Quick clot placed via anoscope by CRS.  - H/o multiple polyps on remote colonoscopies; however patient states that she would refuse treatment if colon or rectal cancer was found.  - H/H 9.2->8.4/29.2 from 11.7/36.6 in February.  Will trend.  - Sitz baths, bowel regimen, fiber supplement.

## 2017-08-17 NOTE — SUBJECTIVE & OBJECTIVE
(Not in a hospital admission)    Review of patient's allergies indicates:   Allergen Reactions    Tramadol      Confusion and sleeping and unsteady.       Past Medical History:   Diagnosis Date    Arthritis     Basal cell carcinoma     Complete heart block 2016    Diabetes mellitus type II     GERD (gastroesophageal reflux disease)     Hiatal hernia     HTN (hypertension)     Hyperlipidemia     Lung disease, interstitial     OA (osteoarthritis)     Pulmonary fibrosis     PVC (premature ventricular contraction)     Steroid long-term use 11/8/2012     Past Surgical History:   Procedure Laterality Date    APPENDECTOMY      CATARACT EXTRACTION EXTRACAPSULAR W/ INTRAOCULAR LENS IMPLANTATION      X 2    CHOLECYSTECTOMY      INSERT / REPLACE / REMOVE PACEMAKER  06/2016    [facility]    TONSILLECTOMY, ADENOIDECTOMY      TOTAL KNEE ARTHROPLASTY       Family History     Problem Relation (Age of Onset)    Tuberculosis Mother, Father        Social History Main Topics    Smoking status: Never Smoker    Smokeless tobacco: Never Used    Alcohol use No    Drug use: No    Sexual activity: No     Review of Systems  Objective:     Vital Signs (Most Recent):  Temp: 97.7 °F (36.5 °C) (08/16/17 1902)  Pulse: 60 (08/16/17 1948)  Resp: 16 (08/16/17 1948)  BP: (!) 158/70 (08/16/17 1948)  SpO2: 96 % (08/16/17 1948) Vital Signs (24h Range):  Temp:  [97.7 °F (36.5 °C)] 97.7 °F (36.5 °C)  Pulse:  [60-64] 60  Resp:  [16] 16  SpO2:  [96 %-100 %] 96 %  BP: (158-191)/(70-86) 158/70     Weight: 63.5 kg (140 lb)  Body mass index is 25.61 kg/m².    Physical Exam   Constitutional: She is oriented to person, place, and time. She appears well-developed and well-nourished. No distress.   HENT:   Head: Normocephalic and atraumatic.   Eyes: EOM are normal.   Neck: Normal range of motion.   Cardiovascular: Normal rate and regular rhythm.    Pulmonary/Chest: Effort normal.   Abdominal: Soft. She exhibits no distension. There is no  tenderness. There is no rebound and no guarding.   Neurological: She is alert and oriented to person, place, and time.   Skin: Skin is warm and dry. She is not diaphoretic. No pallor.   Psychiatric: She has a normal mood and affect. Her behavior is normal.       Anorectal Exam:  Anal Skin: mild non-bleeding superficial excoriation/fissure of left lateral anal verge    Digital Rectal Exam:  Resting Tone normal  Squeeze low  Relaxation with bear down present  Mass none  Rectocele  absent  Tenderness  absent    Anoscopy:  Hemorrhoids  Present (mildly engorged internal hemorrhoids)  Stigmata of bleeding  Present (no active bleeding, no visible vessels, small amount of clotted blood on hemorrhoids)  Stigmata of prolapsed  absent   Distal rectal mucosa normal    Significant Labs:  BMP (Last 3 Results):   Recent Labs  Lab 08/16/17  1738   *      K 4.6      CO2 25   BUN 42*   CREATININE 1.6*   CALCIUM 9.3     CBC (Last 3 Results):   Recent Labs  Lab 08/16/17  1738   WBC 10.31   RBC 3.30*   HGB 9.2*   HCT 29.2*      MCV 89   MCH 27.9   MCHC 31.5*     CRP (Last 3 Results):   Recent Labs  Lab 08/16/17  1738   CRP 11.5*

## 2017-08-17 NOTE — HOSPITAL COURSE
Pt admitted observation with history of BRBPR prior to admission with some reported lightheadedness. She has never had this before.  Blood consent signed and type & screen ordered.  CRS consulted and on exam, no active bleeding although some clotted blood on anoscopy at her internal hemorrhoids. CRS placed a piece of quick-clot via the anoscope. It was thought that there may be irritation of her hemorrhoids secondary to diarrhea. Fiber supplementation and staying well hydrated, sitz baths as needed was discussed with the patient. Pt's history of multiple polyps on her colonoscopies in 2002 and 2003 was also discussed. Pt states that, even if we found colon or rectal cancer, that she would adamantly refuse treatment. She can follow up with CRS as an outpatient for further evaluation of her hemorrhoids if pt desires. Day of discharge CRS examined pt as overnight it was reported BRB on tip of stool.  CRS agreeable to discharge, pt hemodynamically stable.  [facility] services arranged at discharge.

## 2017-08-17 NOTE — PLAN OF CARE
Problem: Patient Care Overview  Goal: Plan of Care Review  Outcome: Ongoing (interventions implemented as appropriate)  VSS and afebrile. Free from injury and falls. AOx4.    Pain event tx c/ lidocaine patch to R hip.    Monitor for pain/adverse events.

## 2017-08-17 NOTE — ASSESSMENT & PLAN NOTE
Interstitial lung disease  Chronic respiratory failure with hypoxia  Home O2    - Continue home O2 3L.  - PRN Duonebs, Xopenex BID.

## 2017-08-17 NOTE — ED NOTES
Dr Merino, ED resident, and this nurse at bedside for rectal exam - obvious bright red blood noted to MD glove after digital rectal exam.

## 2017-08-17 NOTE — PROGRESS NOTES
Pt requesting lidocaine patch for arthritic pain on L hip. Specific med requested as pt stated that she uses that at home.    Pain 10/10.

## 2017-08-17 NOTE — SUBJECTIVE & OBJECTIVE
Interval History: no acute events overnight; no further episodes of BRB per patient.  Pt also reports lightheadedness has resolved but she is weak and does use a walker at home.  She has never had HHC.  Pt would like to eat.    Review of Systems   Constitutional: Negative for activity change, appetite change, chills, fatigue, fever and unexpected weight change.   HENT: Negative for congestion, rhinorrhea, sore throat, trouble swallowing and voice change.    Respiratory: Negative for cough, chest tightness, shortness of breath and wheezing.    Cardiovascular: Negative for chest pain, palpitations and leg swelling.   Gastrointestinal: Positive for anal bleeding (resolved day 2). Negative for abdominal distention, abdominal pain, blood in stool, constipation, diarrhea, nausea and vomiting.   Genitourinary: Negative for dysuria, flank pain, frequency, hematuria and urgency.   Musculoskeletal: Negative for arthralgias, back pain, joint swelling and myalgias.   Skin: Negative for color change and rash.   Neurological: Positive for weakness. Negative for dizziness, syncope, facial asymmetry, speech difficulty, light-headedness, numbness and headaches.   Psychiatric/Behavioral: Negative for agitation, confusion, hallucinations and suicidal ideas.     Objective:     Vital Signs (Most Recent):  Temp: 98.2 °F (36.8 °C) (08/17/17 1133)  Pulse: 65 (08/17/17 1133)  Resp: 18 (08/17/17 1133)  BP: (!) 171/72 (08/17/17 1133)  SpO2: 99 % (08/17/17 1133) Vital Signs (24h Range):  Temp:  [97.5 °F (36.4 °C)-98.2 °F (36.8 °C)] 98.2 °F (36.8 °C)  Pulse:  [60-76] 65  Resp:  [16-20] 18  SpO2:  [96 %-100 %] 99 %  BP: (142-191)/(63-86) 171/72     Weight: 63.5 kg (140 lb)  Body mass index is 25.61 kg/m².    Intake/Output Summary (Last 24 hours) at 08/17/17 1145  Last data filed at 08/17/17 0400   Gross per 24 hour   Intake                0 ml   Output              200 ml   Net             -200 ml      Physical Exam   Constitutional: She is  oriented to person, place, and time. She appears well-developed and well-nourished. No distress.   HENT:   Head: Normocephalic and atraumatic.   Eyes: Pupils are equal, round, and reactive to light.   Neck: Normal range of motion. Neck supple. Carotid bruit is not present.   Cardiovascular: Normal rate and regular rhythm.    Pulmonary/Chest: Effort normal. No respiratory distress. She has no wheezes. She has no rales.   Abdominal: Soft. Bowel sounds are normal. There is no splenomegaly or hepatomegaly.   Musculoskeletal: Normal range of motion. She exhibits no edema or deformity.   Neurological: She is alert and oriented to person, place, and time. No cranial nerve deficit or sensory deficit.   Skin: Skin is warm and dry.   Psychiatric: She has a normal mood and affect.       Significant Labs: All pertinent labs within the past 24 hours have been reviewed.    Significant Imaging: I have reviewed all pertinent imaging results/findings within the past 24 hours.

## 2017-08-17 NOTE — H&P
"[facility] Hospital Medicine  History & Physical    Patient Name: Jahaira Garcia  MRN: [id number]  Admission Date: 8/16/2017  Attending Physician: Hallie Huggins MD   Primary Care Provider: Gayathri Resendiz MD    [facility] Medicine Team: [facility] HOSP MED F KAMRYN HumphreyC     Patient information was obtained from patient, past medical records and ER records.     Subjective:     Principal Problem:Rectal bleeding    Chief Complaint:   Chief Complaint   Patient presents with    Rectal Bleeding     1 episode oif bright red blood today while trying to have a BM. Denies pain. On xarelto. HX: pulmonary fibrosis- on 3L home O2.         HPI: Patient is an 88 year old lady with a h/o DM II, pulmonary fibrosis, ILD, HTN, CKD III, A fib s/p PPM.  Patient states that she has had diarrhea for the past 2-3 days about three times a day.  She has chronic constipation and recently switched from Miralax to fiber.  Today, she states that she felt like she needed to have a BM and instead had a large blood clot (approximately 3-4" in diameter) and BRB on the toilet paper and in the toilet.  She continued to bleed afterwards.  She has not had any more bleeding since about 13:00.  She denies abdominal and rectal pain, melena, hematochezia, hematemesis, chest pain, worsened SOB, dizziness, palpitations, fever/chills, N/V.    Past Medical History:   Diagnosis Date    Arthritis     Basal cell carcinoma     Complete heart block 2016    Diabetes mellitus type II     GERD (gastroesophageal reflux disease)     Hiatal hernia     HTN (hypertension)     Hyperlipidemia     Lung disease, interstitial     OA (osteoarthritis)     Pulmonary fibrosis     PVC (premature ventricular contraction)     Steroid long-term use 11/8/2012       Past Surgical History:   Procedure Laterality Date    APPENDECTOMY      CATARACT EXTRACTION EXTRACAPSULAR W/ INTRAOCULAR LENS IMPLANTATION      X 2    CHOLECYSTECTOMY      " "INSERT / REPLACE / REMOVE PACEMAKER  06/2016    [facility]    TONSILLECTOMY, ADENOIDECTOMY      TOTAL KNEE ARTHROPLASTY         Review of patient's allergies indicates:   Allergen Reactions    Tramadol      Confusion and sleeping and unsteady.       No current facility-administered medications on file prior to encounter.      Current Outpatient Prescriptions on File Prior to Encounter   Medication Sig    acetaminophen (TYLENOL) 325 MG tablet Take 1-2 tablets (325-650 mg total) by mouth nightly as needed for Pain.    albuterol-ipratropium 2.5mg-0.5mg/3mL (DUO-NEB) 0.5 mg-3 mg(2.5 mg base)/3 mL nebulizer solution Take 3 mLs by nebulization every evening. And every 6 hours as needed.    artificial tears (ISOPTO TEARS) 0.5 % ophthalmic solution Place 1 drop into both eyes as needed.    BD ULTRA-FINE CHARAN PEN NEEDLES 32 gauge x 5/32" Ndle USE ONE 3 TIMES DAILY    carvedilol (COREG) 3.125 MG tablet Take 1 tablet (3.125 mg total) by mouth 2 (two) times daily.    FOLIC ACID/MULTIVIT-MIN/LUTEIN (CENTRUM SILVER ORAL) Take 1 tablet by mouth once daily.    insulin lispro (HUMALOG KWIKPEN) 100 unit/mL InPn pen Patient injects 18 units with breakfast, 18 units with lunch, and 18 units dinner.    levalbuterol (XOPENEX) 0.63 mg/3 mL nebulizer solution USE ONE VIAL TWICE DAILY    losartan (COZAAR) 50 MG tablet Take 1 tablet (50 mg total) by mouth 2 (two) times daily with meals.    melatonin 1 mg Tab Take by mouth.    mupirocin (BACTROBAN) 2 % ointment Apply topically 3 (three) times daily.    ONETOUCH DELICA LANCETS 33 gauge Misc USE THREE TIMES DAILY    ONETOUCH VERIO Strp USE ONE 5 TIMES DAILY    pravastatin (PRAVACHOL) 20 MG tablet Take 1 tablet (20 mg total) by mouth once daily.    predniSONE (DELTASONE) 10 MG tablet Take 1 tablet (10 mg total) by mouth every morning. 1 tablet every morning    predniSONE (DELTASONE) 5 MG tablet     rivaroxaban (XARELTO) 15 mg Tab Take 1 tablet (15 mg total) by mouth daily with " dinner or evening meal.    UNABLE TO FIND Take by mouth once daily. medication name: protandim     Family History     Problem Relation (Age of Onset)    Tuberculosis Mother, Father        Social History Main Topics    Smoking status: Never Smoker    Smokeless tobacco: Never Used    Alcohol use No    Drug use: No    Sexual activity: No     Review of Systems   Constitutional: Negative for activity change, appetite change, chills, fatigue, fever and unexpected weight change.   HENT: Negative for congestion, rhinorrhea, sore throat, trouble swallowing and voice change.    Eyes: Negative for visual disturbance.   Respiratory: Negative for cough, choking, chest tightness, shortness of breath and wheezing.    Cardiovascular: Negative for chest pain, palpitations and leg swelling.   Gastrointestinal: Positive for anal bleeding. Negative for abdominal distention, abdominal pain, blood in stool, constipation, diarrhea, nausea and vomiting.   Endocrine: Negative for cold intolerance, heat intolerance, polydipsia and polyuria.   Genitourinary: Negative for dysuria, flank pain, frequency, hematuria and urgency.   Musculoskeletal: Negative for arthralgias, back pain, joint swelling and myalgias.   Skin: Negative for color change and rash.   Neurological: Negative for dizziness, seizures, syncope, facial asymmetry, speech difficulty, weakness, light-headedness, numbness and headaches.   Hematological: Negative for adenopathy. Does not bruise/bleed easily.   Psychiatric/Behavioral: Negative for agitation, confusion, hallucinations and suicidal ideas.     Objective:     Vital Signs (Most Recent):  Temp: 97.8 °F (36.6 °C) (08/17/17 0109)  Pulse: 70 (08/17/17 0109)  Resp: 16 (08/17/17 0109)  BP: (!) 142/65 (08/17/17 0109)  SpO2: 98 % (08/17/17 0109) Vital Signs (24h Range):  Temp:  [97.7 °F (36.5 °C)-97.8 °F (36.6 °C)] 97.8 °F (36.6 °C)  Pulse:  [60-70] 70  Resp:  [16] 16  SpO2:  [96 %-100 %] 98 %  BP: (142-191)/(65-86) 142/65      Weight: 63.5 kg (140 lb)  Body mass index is 25.61 kg/m².    Physical Exam   Constitutional: She is oriented to person, place, and time. She appears well-developed and well-nourished. No distress.   HENT:   Head: Normocephalic and atraumatic.   Eyes: Pupils are equal, round, and reactive to light.   Neck: Neck supple. Carotid bruit is not present. No thyromegaly present.   Cardiovascular: Normal rate and regular rhythm.  Exam reveals no gallop.    No murmur heard.  Pulmonary/Chest: Effort normal. No respiratory distress. She has no wheezes. She has rales.   Abdominal: Soft. Bowel sounds are normal. She exhibits no distension and no mass. There is no splenomegaly or hepatomegaly. There is no tenderness.   Musculoskeletal: Normal range of motion. She exhibits no edema or deformity.   Neurological: She is alert and oriented to person, place, and time. No cranial nerve deficit or sensory deficit.   Skin: Skin is warm and dry. No rash noted.   Psychiatric: She has a normal mood and affect.        Significant Labs: All pertinent labs within the past 24 hours have been reviewed.    Significant Imaging: I have reviewed all pertinent imaging results/findings within the past 24 hours.    Assessment/Plan:     * Rectal bleeding    Internal hemorrhoid, bleeding    - Appreciate CRS's assistance.  Patient continues to have no active bleeding.  Likely secondary to hemorrhoid irritation from diarrhea.  Quick clot placed via anoscope by CRS.  - H/o multiple polyps on remote colonoscopies; however patient states that she would refuse treatment if colon or rectal cancer was found.  - H/H 9.2/29.2 from 11.7/36.6 in February.  Will trend.  - Sitz baths, bowel regimen, fiber supplement.        CKD (chronic kidney disease), stage IV    - Creatinine has been elevated over the past month to 1.6, GFR down to 28.6 from 1.2, 40.4 six months ago.  - Will hold losartan.    - Patient has seen urogyn and has been referred to nephrology as  outpatient.          Atrial fibrillation    Cardiac pacemaker in situ  Chronic anticoagulation therapy    - Continue Xarelto 15mg daily.  - [id number] VASc score of at least five for HTN, age, DM II, and sex.          HLD (hyperlipidemia)    - Continue pravastatin 20mg daily.          Essential hypertension    - Will hold losartan 50mg BID, see above.  - PRN hydralazine.          Uncontrolled type 2 diabetes mellitus with stage 4 chronic kidney disease, with long-term current use of insulin    - Diabetic diet, SSI.  Insulin aspart 5 units TID.          Pulmonary fibrosis    Interstitial lung disease  Chronic respiratory failure with hypoxia  Home O2    - Continue home O2 3L.  - PRN Duonebs, Xopenex BID.          VTE Risk Mitigation         Ordered     rivaroxaban tablet 15 mg  With dinner     Route:  Oral        08/17/17 0123     Place sequential compression device  Until discontinued      08/17/17 0123     Medium Risk of VTE  Once      08/17/17 0123     Place MILAD hose  Until discontinued      08/17/17 0123             Clarice Soria PA-C  Department of Hospital Medicine   [facility]

## 2017-08-17 NOTE — ED NOTES
Hourly rounding complete.  Patient updated on plan of care and current status. Patient repositioned with wedge onto left side for comfort. Items within reach. Toileting offered.

## 2017-08-17 NOTE — ASSESSMENT & PLAN NOTE
- Creatinine has been elevated over the past month to 1.6, GFR down to 28.6 from 1.2, 40.4 six months ago.  - Will hold losartan.    - Patient has seen urogyn and has been referred to nephrology as outpatient.

## 2017-08-17 NOTE — ASSESSMENT & PLAN NOTE
Interstitial lung disease  Chronic respiratory failure with hypoxia  Home O2    - Continue home O2 3L.  - PRN Duonebs, Xopenex BID.  - PT/OT ordered

## 2017-08-17 NOTE — HPI
88yoWF who presented to ED today with history of BRBPR earlier today with some reported lightheadedness. She has never had this before. She has a PMH of, among other things, pulmonary fibrosis (on home oxygen and PO prednisone), pacemaker (on xarelto), DM, CKD3, HTN. She denies a history of hemorrhoids. She denies pain with bowel movement. She reports diarrhea for the past 3 days. Per the chart, she had a colonoscopy in 2002 with over 20 polyps, followed by another colonoscopy in 2003 with about 15 polyps. She does not believe she has had any colonoscopies since. She denies abdominal pain. She reports no further blood per rectum since earlier this afternoon.

## 2017-08-17 NOTE — HPI
"Patient is an 88 year old lady with a h/o DM II, pulmonary fibrosis, ILD, HTN, CKD III, A fib s/p PPM.  Patient states that she has had diarrhea for the past 2-3 days about three times a day.  She has chronic constipation and recently switched from Miralax to fiber.  Today, she states that she felt like she needed to have a BM and instead had a large blood clot (approximately 3-4" in diameter) and BRB on the toilet paper and in the toilet.  She continued to bleed afterwards.  She has not had any more bleeding since about 13:00.  She denies abdominal and rectal pain, melena, hematochezia, hematemesis, chest pain, worsened SOB, dizziness, palpitations, fever/chills, N/V.  "

## 2017-08-17 NOTE — CONSULTS
[facility]  Colorectal Surgery  Consult Note    Patient Name: Jahaira Garcia  MRN: [id number]  Admission Date: 8/16/2017  Hospital Length of Stay: 0 days  Attending Physician: Michael Merino MD  Primary Care Provider: Gayathri Resendiz MD    Inpatient consult to Colorectal Surgery  Consult performed by: DANIS NAZARIO  Consult ordered by: RENETTA SCOTT  Reason for consult: Rectal bleeding        Subjective:     Chief Complaint/Reason for Admission: Rectal bleeding    History of Present Illness:  88yoWF who presented to ED today with history of BRBPR earlier today with some reported lightheadedness. She has never had this before. She has a PMH of, among other things, pulmonary fibrosis (on home oxygen and PO prednisone), pacemaker (on xarelto), DM, CKD3, HTN. She denies a history of hemorrhoids. She denies pain with bowel movement. She reports diarrhea for the past 3 days. Per the chart, she had a colonoscopy in 2002 with over 20 polyps, followed by another colonoscopy in 2003 with about 15 polyps. She does not believe she has had any colonoscopies since. She denies abdominal pain. She reports no further blood per rectum since earlier this afternoon.      (Not in a hospital admission)    Review of patient's allergies indicates:   Allergen Reactions    Tramadol      Confusion and sleeping and unsteady.       Past Medical History:   Diagnosis Date    Arthritis     Basal cell carcinoma     Complete heart block 2016    Diabetes mellitus type II     GERD (gastroesophageal reflux disease)     Hiatal hernia     HTN (hypertension)     Hyperlipidemia     Lung disease, interstitial     OA (osteoarthritis)     Pulmonary fibrosis     PVC (premature ventricular contraction)     Steroid long-term use 11/8/2012     Past Surgical History:   Procedure Laterality Date    APPENDECTOMY      CATARACT EXTRACTION EXTRACAPSULAR W/ INTRAOCULAR LENS IMPLANTATION      X 2    CHOLECYSTECTOMY       INSERT / REPLACE / REM[facility] PACEMAK[facility]  06/2016    [facility]    TONSILLECTOMY, ADENOIDECTOMY      TOTAL KNEE ARTHROPLASTY       Family History     Problem Relation (Age of Onset)    Tuberculosis Mother, Father        Social History Main Topics    Smoking status: Never Smoker    Smokeless tobacco: Never Used    Alcohol use No    Drug use: No    Sexual activity: No     Review of Systems  Objective:     Vital Signs (Most Recent):  Temp: 97.7 °F (36.5 °C) (08/16/17 1902)  Pulse: 60 (08/16/17 1948)  Resp: 16 (08/16/17 1948)  BP: (!) 158/70 (08/16/17 1948)  SpO2: 96 % (08/16/17 1948) Vital Signs (24h Range):  Temp:  [97.7 °F (36.5 °C)] 97.7 °F (36.5 °C)  Pulse:  [60-64] 60  Resp:  [16] 16  SpO2:  [96 %-100 %] 96 %  BP: (158-191)/(70-86) 158/70     Weight: 63.5 kg (140 lb)  Body mass index is 25.61 kg/m².    Physical Exam   Constitutional: She is oriented to person, place, and time. She appears well-developed and well-nourished. No distress.   HENT:   Head: Normocephalic and atraumatic.   Eyes: EOM are normal.   Neck: Normal range of motion.   Cardiovascular: Normal rate and regular rhythm.    Pulmonary/Chest: Effort normal.   Abdominal: Soft. She exhibits no distension. There is no tenderness. There is no rebound and no guarding.   Neurological: She is alert and oriented to person, place, and time.   Skin: Skin is warm and dry. She is not diaphoretic. No pallor.   Psychiatric: She has a normal mood and affect. Her behavior is normal.       Anorectal Exam:  Anal Skin: mild non-bleeding superficial excoriation/fissure of left lateral anal verge    Digital Rectal Exam:  Resting Tone normal  Squeeze low  Relaxation with bear down present  Mass none  Rectocele  absent  Tenderness  absent    Anoscopy:  Hemorrhoids  Present (mildly engorged internal hemorrhoids)  Stigmata of bleeding  Present (no active bleeding, no visible vessels, small amount of clotted blood on hemorrhoids)  Stigmata of prolapsed  absent   Distal rectal  mucosa normal    Significant Labs:  BMP (Last 3 Results):   Recent Labs  Lab 08/16/17  1738   *      K 4.6      CO2 25   BUN 42*   CREATININE 1.6*   CALCIUM 9.3     CBC (Last 3 Results):   Recent Labs  Lab 08/16/17  1738   WBC 10.31   RBC 3.30*   HGB 9.2*   HCT 29.2*      MCV 89   MCH 27.9   MCHC 31.5*     CRP (Last 3 Results):   Recent Labs  Lab 08/16/17  1738   CRP 11.5*     Assessment/Plan:     * Blood per rectum    88yoWF with multiple medical comorbidities with an episode of BRBPR at home with some reported light-headedness.    -AFVSS since arrival to ED, Hg 9.2. No further bloody BM since around 1300 today. On exam, no active bleeding although some clotted blood on anoscopy at her internal hemorrhoids. I placed a piece of quick-clot via the anoscope. May be irritation of her hemorrhoids secondary to diarrhea. I discussed fiber supplementation and staying well hydrated, sitz baths as needed. We discussed her history of multiple polyps on her colonoscopies in 2002 and 2003. She states that, even if we found colon or rectal cancer, that she would adamantly refuse treatment. She can follow up with us as an outpatient for further evaluation of her hemorrhoids if she wishes. Instructed patient to be vigilant for repeated bloody bowel movements associated with other symptoms such as syncope/lightheadedness. If she needs to be admitted, would recommend admission to hospitalist given her comorbidities. Do not think she needs to be admitted from CRS standpoint.             Thank you for your consult. I will sign off. Please contact us if you have any additional questions.    Robel Edge MD  Colorectal Surgery  [facility]

## 2017-08-17 NOTE — ASSESSMENT & PLAN NOTE
Cardiac pacemaker in situ  Chronic anticoagulation therapy    - Continue Xarelto 15mg daily.  - [id number] VASc score of at least five for HTN, age, DM II, and sex.

## 2017-08-18 VITALS
RESPIRATION RATE: 17 BRPM | TEMPERATURE: 98 F | DIASTOLIC BLOOD PRESSURE: 71 MMHG | HEIGHT: 62 IN | OXYGEN SATURATION: 100 % | BODY MASS INDEX: 25.76 KG/M2 | HEART RATE: 70 BPM | WEIGHT: 140 LBS | SYSTOLIC BLOOD PRESSURE: 165 MMHG

## 2017-08-18 PROBLEM — K62.5 RECTAL BLEEDING: Status: RESOLVED | Noted: 2017-08-16 | Resolved: 2017-08-18

## 2017-08-18 LAB
ANION GAP SERPL CALC-SCNC: 8 MMOL/L
BASOPHILS # BLD AUTO: 0.01 K/UL
BASOPHILS NFR BLD: 0.1 %
BUN SERPL-MCNC: 28 MG/DL
CALCIUM SERPL-MCNC: 8.5 MG/DL
CHLORIDE SERPL-SCNC: 108 MMOL/L
CO2 SERPL-SCNC: 22 MMOL/L
CREAT SERPL-MCNC: 1.3 MG/DL
DIFFERENTIAL METHOD: ABNORMAL
EOSINOPHIL # BLD AUTO: 0 K/UL
EOSINOPHIL NFR BLD: 0.2 %
ERYTHROCYTE [DISTWIDTH] IN BLOOD BY AUTOMATED COUNT: 14.3 %
EST. GFR  (AFRICAN AMERICAN): 42.3 ML/MIN/1.73 M^2
EST. GFR  (NON AFRICAN AMERICAN): 36.7 ML/MIN/1.73 M^2
GLUCOSE SERPL-MCNC: 129 MG/DL
HCT VFR BLD AUTO: 26.2 %
HGB BLD-MCNC: 8.1 G/DL
LYMPHOCYTES # BLD AUTO: 1.7 K/UL
LYMPHOCYTES NFR BLD: 20.1 %
MCH RBC QN AUTO: 28.4 PG
MCHC RBC AUTO-ENTMCNC: 30.9 G/DL
MCV RBC AUTO: 92 FL
MONOCYTES # BLD AUTO: 0.4 K/UL
MONOCYTES NFR BLD: 4.4 %
NEUTROPHILS # BLD AUTO: 6.2 K/UL
NEUTROPHILS NFR BLD: 74.6 %
PLATELET # BLD AUTO: 313 K/UL
PMV BLD AUTO: 8.7 FL
POCT GLUCOSE: 189 MG/DL (ref 70–110)
POCT GLUCOSE: 261 MG/DL (ref 70–110)
POCT GLUCOSE: 312 MG/DL (ref 70–110)
POCT GLUCOSE: 323 MG/DL (ref 70–110)
POTASSIUM SERPL-SCNC: 4.7 MMOL/L
RBC # BLD AUTO: 2.85 M/UL
SODIUM SERPL-SCNC: 138 MMOL/L
WBC # BLD AUTO: 8.37 K/UL

## 2017-08-18 PROCEDURE — 27000221 HC OXYGEN, UP TO 24 HOURS

## 2017-08-18 PROCEDURE — 85025 COMPLETE CBC W/AUTO DIFF WBC: CPT

## 2017-08-18 PROCEDURE — G8989 SELF CARE D/C STATUS: HCPCS | Mod: CI

## 2017-08-18 PROCEDURE — 36415 COLL VENOUS BLD VENIPUNCTURE: CPT

## 2017-08-18 PROCEDURE — G8988 SELF CARE GOAL STATUS: HCPCS | Mod: CK

## 2017-08-18 PROCEDURE — 25000003 PHARM REV CODE 250: Performed by: PHYSICIAN ASSISTANT

## 2017-08-18 PROCEDURE — 99217 PR OBSERVATION CARE DISCHARGE: CPT | Mod: ,,, | Performed by: PHYSICIAN ASSISTANT

## 2017-08-18 PROCEDURE — G0378 HOSPITAL OBSERVATION PER HR: HCPCS

## 2017-08-18 PROCEDURE — 80048 BASIC METABOLIC PNL TOTAL CA: CPT

## 2017-08-18 PROCEDURE — G8980 MOBILITY D/C STATUS: HCPCS | Mod: CK

## 2017-08-18 PROCEDURE — 97165 OT EVAL LOW COMPLEX 30 MIN: CPT

## 2017-08-18 PROCEDURE — G8979 MOBILITY GOAL STATUS: HCPCS | Mod: CJ

## 2017-08-18 PROCEDURE — 63600175 PHARM REV CODE 636 W HCPCS: Performed by: PHYSICIAN ASSISTANT

## 2017-08-18 PROCEDURE — G8978 MOBILITY CURRENT STATUS: HCPCS | Mod: CK

## 2017-08-18 PROCEDURE — 97530 THERAPEUTIC ACTIVITIES: CPT

## 2017-08-18 PROCEDURE — 97161 PT EVAL LOW COMPLEX 20 MIN: CPT

## 2017-08-18 PROCEDURE — 94640 AIRWAY INHALATION TREATMENT: CPT

## 2017-08-18 PROCEDURE — 25000242 PHARM REV CODE 250 ALT 637 W/ HCPCS: Performed by: PHYSICIAN ASSISTANT

## 2017-08-18 RX ORDER — POLYETHYLENE GLYCOL 3350 17 G/17G
17 POWDER, FOR SOLUTION ORAL 3 TIMES DAILY PRN
Qty: 100 PACKET | Refills: 0 | Status: SHIPPED | OUTPATIENT
Start: 2017-08-18 | End: 2018-08-13

## 2017-08-18 RX ADMIN — INSULIN ASPART 5 UNITS: 100 INJECTION, SOLUTION INTRAVENOUS; SUBCUTANEOUS at 09:08

## 2017-08-18 RX ADMIN — INSULIN ASPART 5 UNITS: 100 INJECTION, SOLUTION INTRAVENOUS; SUBCUTANEOUS at 11:08

## 2017-08-18 RX ADMIN — INSULIN ASPART 5 UNITS: 100 INJECTION, SOLUTION INTRAVENOUS; SUBCUTANEOUS at 05:08

## 2017-08-18 RX ADMIN — PREDNISONE 5 MG: 5 TABLET ORAL at 09:08

## 2017-08-18 RX ADMIN — PRAVASTATIN SODIUM 20 MG: 20 TABLET ORAL at 09:08

## 2017-08-18 RX ADMIN — RIVAROXABAN 15 MG: 15 TABLET, FILM COATED ORAL at 05:08

## 2017-08-18 RX ADMIN — PSYLLIUM HUSK 1 PACKET: 3.4 POWDER ORAL at 11:08

## 2017-08-18 RX ADMIN — INSULIN ASPART 4 UNITS: 100 INJECTION, SOLUTION INTRAVENOUS; SUBCUTANEOUS at 05:08

## 2017-08-18 RX ADMIN — LEVALBUTEROL HYDROCHLORIDE 0.63 MG: 0.63 SOLUTION RESPIRATORY (INHALATION) at 09:08

## 2017-08-18 RX ADMIN — INSULIN ASPART 8 UNITS: 100 INJECTION, SOLUTION INTRAVENOUS; SUBCUTANEOUS at 11:08

## 2017-08-18 RX ADMIN — LIDOCAINE 1 PATCH: 50 PATCH TOPICAL at 09:08

## 2017-08-18 RX ADMIN — CARVEDILOL 3.12 MG: 3.12 TABLET, FILM COATED ORAL at 09:08

## 2017-08-18 NOTE — PT/OT/SLP EVAL
Occupational Therapy  Evaluation    Jahaira Garcia   MRN: [id number]   Admitting Diagnosis: Rectal bleeding    OT Date of Treatment: 08/18/17   OT Start Time: 1059  OT Stop Time: 1126  OT Total Time (min): 27 min    Billable Minutes:  Evaluation 15  Therapeutic Activity 12    Diagnosis: Rectal bleeding     Past Medical History:   Diagnosis Date    Arthritis     Basal cell carcinoma     Complete heart block 2016    Diabetes mellitus type II     GERD (gastroesophageal reflux disease)     Hiatal hernia     HTN (hypertension)     Hyperlipidemia     Lung disease, interstitial     OA (osteoarthritis)     Pulmonary fibrosis     PVC (premature ventricular contraction)     Steroid long-term use 11/8/2012      Past Surgical History:   Procedure Laterality Date    APPENDECTOMY      CATARACT EXTRACTION EXTRACAPSULAR W/ INTRAOCULAR LENS IMPLANTATION      X 2    CHOLECYSTECTOMY      INSERT / REPLACE / REM[facility] PAC[facility]2016    [facility]    TONSILLECTOMY, ADENOIDECTOMY      TOTAL KNEE ARTHROPLASTY         Referring physician: Bhavna  Date referred to OT: 8/18/17    General Precautions: Standard, fall  Orthopedic Precautions:    Braces:            Patient History:  Living Environment  Lives With: alone  Living Arrangements: house  Transportation Available: family or friend will provide  Living Environment Comment: Pt lives in a [facility] where she has a sitter 2x/wk for ADL assist and daughter is often around (working out of house) to assist as needed. Pt was getting help with ADLs, using a rollator inside and a W/C for community.   Equipment Currently Used at Home: bedside commode, oxygen, walker, rolling, rollator, wheelchair    Prior level of function:   Bed Mobility/Transfers: needs device and assist  Grooming: independent  Bathing: needs assist  Upper Body Dressing: needs assist  Lower Body Dressing: needs assist  Toileting: needs assist  Home Management Skills: unable to perform        Dominant hand:  right    Subjective:  Communicated with Rn prior to session.  Chief Complaint: SOB  Patient/Family stated goals: Be more (I).     Pain/Comfort  Pain Rating 1: other (see comments) (No rating given.)  Location - Side 1: Right  Location 1: hip    Objective:  Patient found with: oxygen    Cognitive Exam:  Oriented to: Person, Place, Time and Situation  Follows Commands/attention: Follows multistep  commands  Communication: clear/fluent  Memory:  No Deficits noted  Safety awareness/insight to disability: intact  Coping skills/emotional control: Appropriate to situation    Visual/perceptual:  Intact    Physical Exam:  Postural examination/scapula alignment: No postural abnormalities identified  Skin integrity: Visible skin intact  Edema: None noted     Sensation:   Intact    Upper Extremity Range of Motion:  Right Upper Extremity: WFL  Left Upper Extremity: WFL    Upper Extremity Strength:  Right Upper Extremity: WFL  Left Upper Extremity: WFL   Strength: wfl    Fine motor coordination:   Intact    Gross motor coordination: WFL    Functional Mobility:  Bed Mobility:  Rolling/Turning to Left: Stand by assistance  Scooting/Bridging: Stand by Assistance  Supine to Sit: Stand by Assistance  Sit to Supine: Minimum Assistance, With leg lift    Transfers:  Sit <> Stand Assistance: Contact Guard Assistance  Sit <> Stand Assistive Device: Rolling Walker    Functional Ambulation: Pt walked ~ 40 ft CGA with a RW and 2 standing rest breaks 2/2 SOB.    Activities of Daily Living:     UE Dressing Level of Assistance: Minimum assistance  LE Dressing Level of Assistance: Total assistance    Balance:   Static Sit: GOOD: Takes MODERATE challenges from all directions  Dynamic Sit: GOOD: Maintains balance through MODERATE excursions of active trunk movement  Static Stand: FAIR+: Takes MINIMAL challenges from all directions  Dynamic stand: FAIR: Needs CONTACT GUARD during gait    Therapeutic Activities and Exercises:  UE ROM/MMT  Bed  "mobility training / assessment  Functional mobility assessment  Sit/standing balance assessment  Discussed OT POC / Post-acute plan    AM-PAC 6 CLICK ADL  How much help from another person does this patient currently need?  1 = Unable, Total/Dependent Assistance  2 = A lot, Maximum/Moderate Assistance  3 = A little, Minimum/Contact Guard/Supervision  4 = None, Modified [place]/Independent    Putting on and taking off regular lower body clothing? : 1  Bathing (including washing, rinsing, drying)?: 2  Toileting, which includes using toilet, bedpan, or urinal? : 2  Putting on and taking off regular upper body clothing?: 3  Taking care of personal grooming such as brushing teeth?: 3  Eating meals?: 4  Total Score: 15    AM-PAC Raw Score CMS "G-Code Modifier Level of Impairment Assistance   6 % Total / Unable   7 - 9 CM 80 - 100% Maximal Assist   10-14 CL 60 - 80% Moderate Assist   15 - 19 CK 40 - 60% Moderate Assist   20 - 22 CJ 20 - 40% Minimal Assist   23 CI 1-20% SBA / CGA   24 CH 0% Independent/ Mod I       Patient left supine with all lines intact and call button in reach    Assessment:  Jahaira Garcia is a 88 y.o. female with a medical diagnosis of Rectal bleeding and presents with decreased (I) in multiple ADL areas, functional mobility & t/fs as well as decreased overall strength, ROM, endurance and balance. Pt would benefit from skilled OT services as well as Short-stay SNF placement to address these deficits and to facilitate improving (I) with daily tasks.    Pt evaluation falls under low complexity for evaluation coding due to performance deficits noted in 1-3 areas as stated above and 0 co-morbities affecting current functional status. Data obtained from problem focused assessments. No modifications or assistance was required for completion of evaluation. Only brief occupational profile and history review completed.     Rehab identified problem list/impairments: Rehab identified problem " list/impairments: weakness, gait instability, impaired endurance, impaired balance, impaired self care skills, impaired functional mobilty, decreased coordination, edema, impaired cardiopulmonary response to activity, decreased upper extremity function, decreased lower extremity function    Rehab potential is good.    Activity tolerance: Good    Discharge recommendations: Discharge Facility/Level Of Care Needs: nursing facility, skilled     Barriers to discharge: Barriers to Discharge: None    Equipment recommendations: none     GOALS:    Occupational Therapy Goals        Problem: Occupational Therapy Goal    Goal Priority Disciplines Outcome Interventions   Occupational Therapy Goal     OT, PT/OT Ongoing (interventions implemented as appropriate)    Description:  Goals to be met by: 8/25/17    Patient will increase functional independence with ADLs by performing:    UE Dressing with Set-up Assistance.  LE Dressing with Stand-by Assistance and Assistive Devices as needed.  Grooming while standing at sink with Supervision.  Toileting from toilet with Supervision for hygiene and clothing management.   Supine to sit with [place].  Stand pivot transfers with Supervision.  Toilet transfer to toilet with Supervision.                      PLAN:  Patient to be seen 3 x/week to address the above listed problems via self-care/home management, therapeutic activities, therapeutic exercises  Plan of Care expires: 09/17/17  Plan of Care reviewed with: patient, daughter         Michael HernandezAYE  08/18/2017

## 2017-08-18 NOTE — PLAN OF CARE
Problem: Occupational Therapy Goal  Goal: Occupational Therapy Goal  Goals to be met by: 8/25/17    Patient will increase functional independence with ADLs by performing:    UE Dressing with Set-up Assistance.  LE Dressing with Stand-by Assistance and Assistive Devices as needed.  Grooming while standing at sink with Supervision.  Toileting from toilet with Supervision for hygiene and clothing management.   Supine to sit with [place].  Stand pivot transfers with Supervision.  Toilet transfer to toilet with Supervision.    Outcome: Ongoing (interventions implemented as appropriate)  Evaluation completed and POC established.    AYE Leone

## 2017-08-18 NOTE — PT/OT/SLP EVAL
Physical Therapy  Evaluation    Jahaira Garcia   MRN: [id number]   Admitting Diagnosis: Rectal bleeding    PT Received On: 08/18/17  PT Start Time: 1100     PT Stop Time: 1127    PT Total Time (min): 27 min       Billable Minutes:  Evaluation 27    Diagnosis: Rectal bleeding      Past Medical History:   Diagnosis Date    Arthritis     Basal cell carcinoma     Complete heart block 2016    Diabetes mellitus type II     GERD (gastroesophageal reflux disease)     Hiatal hernia     HTN (hypertension)     Hyperlipidemia     Lung disease, interstitial     OA (osteoarthritis)     Pulmonary fibrosis     PVC (premature ventricular contraction)     Steroid long-term use 11/8/2012      Past Surgical History:   Procedure Laterality Date    APPENDECTOMY      CATARACT EXTRACTION EXTRACAPSULAR W/ INTRAOCULAR LENS IMPLANTATION      X 2    CHOLECYSTECTOMY      INSERT / REPLACE / REM[facility] PAC[facility]    TONSILLECTOMY, ADENOIDECTOMY      TOTAL KNEE ARTHROPLASTY         Referring physician: Bhavna  Date referred to PT: 8/17/2017    General Precautions: Standard, fall  Orthopedic Precautions: N/A   Braces: N/A       Do you have any cultural, spiritual, Adventism conflicts, given your current situation?: none stated    Patient History:  Lives With: alone  Living Arrangements: house  Home Accessibility: stairs to enter home  Number of Stairs to Enter Home: 1  Stair Railings at Home: none  Transportation Available: family or friend will provide  Living Environment Comment: Patient lives alone in 1-story home 1 [address] no HRs. Ambulates in house with rollator; community ambulation with wheelchair while someone pushes. Assist with ADLs. Caregiver comes by 2x/week for bathing. Family to assist upon discharge.  Equipment Currently Used at Home: bedside commode, oxygen, grab bar, cane, straight, wheelchair, walker, rolling, rollator  DME owned (not currently used): none    Previous Level of Function:  Ambulation  Skills: needs device  Transfer Skills: needs device  ADL Skills: needs assist    Subjective:  Communicated with RN prior to session.  Patient agreeable to PT session. Daughter present.  Chief Complaint: R hip pain  Patient goals: none stated    Pain/Comfort  Pain Rating 1:  (R hip pain; patient does not rate with VAS scale)  Pain Addressed 1: Reposition, Distraction, Cessation of Activity  Pain Rating Post-Intervention 1: 0/10      Objective:   Patient found with: oxygen     Cognitive Exam:  Oriented to: Person, Place, Time and Situation    Follows Commands/attention: Follows multistep  commands  Communication: clear/fluent  Safety awareness/insight to disability: intact    Physical Exam:  Postural examination/scapula alignment: Rounded shoulder and Head forward    Skin integrity: Visible skin intact  Edema: None noted     Sensation:   Intact    Lower Extremity Range of Motion:  Right Lower Extremity: WFL  Left Lower Extremity: WFL    Lower Extremity Strength:  Right Lower Extremity: WFL  Left Lower Extremity: WFL     Gross motor coordination: WFL    Functional Mobility:  Bed Mobility:  Rolling/Turning to Left: Supervision  Rolling/Turning Right: Supervision  Scooting/Bridging: Supervision  Supine to Sit: Supervision  Sit to Supine: Minimum Assistance (for LE management)    Transfers:  Sit <> Stand Assistance: Stand By Assistance  Sit <> Stand Assistive Device: Rolling Walker  Bed <> Chair Technique: Stand Pivot  Bed <> Chair Assistance: Stand By Assistance  Bed <> Chair Assistive Device: Rolling Walker    Gait:   Gait Distance: 40ft  Assistance 1: Contact Guard Assistance  Gait Assistive Device: Rolling walker  Gait Pattern: swing-through gait  Gait Deviation(s): decreased carmen, increased time in double stance, decreased velocity of limb motion, decreased step length, decreased stride length, decreased swing-to-stance ratio, decreased toe-to-floor clearance    Balance:   Static Sit: NORMAL: No deviations seen in  posture held statically  Dynamic Sit: NORMAL: No deviations seen in posture held dynamically  Static Stand: FAIR: Maintains without assist but unable to take challenges  Dynamic stand: FAIR: Needs CONTACT GUARD during gait    Therapeutic Activities and Exercises:  Patient educated on role of PT/POC.     Bed mobility with supervision  Sit<>stand and stand pivot transfer with supervision using rolling walker    Gait 40ft CGA using rolling walker  Patient with two standing rest breaks x15 seconds secondary to SOB.    Educated on importance of OOB activity and participation with therapy. Verbalized understanding.    White board updated: safe to transfer with RN staff.  RN contacted to get rolling walker for room    AM-PAC 6 CLICK MOBILITY  How much help from another person does this patient currently need?   1 = Unable, Total/Dependent Assistance  2 = A lot, Maximum/Moderate Assistance  3 = A little, Minimum/Contact Guard/Supervision  4 = None, Modified [place]/Independent    Turning over in bed (including adjusting bedclothes, sheets and blankets)?: 3  Sitting down on and standing up from a chair with arms (e.g., wheelchair, bedside commode, etc.): 3  Moving from lying on back to sitting on the side of the bed?: 3  Moving to and from a bed to a chair (including a wheelchair)?: 3  Need to walk in hospital room?: 3  Climbing 3-5 steps with a railing?: 3  Total Score: 18     AM-PAC Raw Score CMS G-Code Modifier Level of Impairment Assistance   6 % Total / Unable   7 - 9 CM 80 - 100% Maximal Assist   10 - 14 CL 60 - 80% Moderate Assist   15 - 19 CK 40 - 60% Moderate Assist   20 - 22 CJ 20 - 40% Minimal Assist   23 CI 1-20% SBA / CGA   24 CH 0% Independent/ Mod I     Patient left supine with all lines intact, call button in reach, RN notified and daughter present.    Assessment:   Jahaira Garcia is a 88 y.o. female with a medical diagnosis of Rectal bleeding and presents with generalized weakness, impaired  endurance, and impaired cardiopulmonary response to activity limiting functional mobility. Bed mobility with supervision. Sit<>stand and stand pivot transfer with SBA using rolling walker. Gait 40ft CGA using rolling walker. No LOB; however, patient with mild SOB requiring two standing 15sec rest breaks before continuing ambulation. To benefit from continued skilled intervention to address deficits prior to transtion to SNF to improve safety and overall functional mobility.    History: personal factors and/or comorbidities that impact the plan of care: rectal bleeding, decreased caregiver support, fall risk  Evaluation of Body Systems: cardiovascular/pulmonary, integumentary, musculoskeletal, neuromuscular, cognition/communication  Functional Outcome Tools: AMPAC, ROM, MMT  Clinical Presentation: stable     Evaluation Complexity Level: Low    Rehab identified problem list/impairments: Rehab identified problem list/impairments: weakness, impaired endurance, impaired sensation, impaired self care skills, impaired functional mobilty, decreased lower extremity function, pain, impaired cardiopulmonary response to activity    Rehab potential is good.    Activity tolerance: Fair    Discharge recommendations: Discharge Facility/Level Of Care Needs: nursing facility, skilled     Barriers to discharge: Barriers to Discharge: None    Equipment recommendations: Equipment Needed After Discharge: none     GOALS:    Physical Therapy Goals        Problem: Physical Therapy Goal    Goal Priority Disciplines Outcome Goal Variances Interventions   Physical Therapy Goal     PT/OT, PT Ongoing (interventions implemented as appropriate)     Description:  Goals to be met by: 2017     Patient will increase functional independence with mobility by performin. Sit to stand transfer with Modified [place]  2. Bed to chair transfer with Modified [place] using Rolling Walker  3. Gait  x 75 feet with Supervision using Rolling  Walker.   4. Lower extremity exercise program x15 reps per handout, with independence                      PLAN:    Patient to be seen 3 x/week to address the above listed problems via gait training, therapeutic activities, therapeutic exercises, neuromuscular re-education  Plan of Care expires: 09/18/17  Plan of Care reviewed with: patient, daughter          Jone Hughes III, PT  08/18/2017

## 2017-08-18 NOTE — NURSING
Patient given discharge instructions. Patient understand instructions, that her prescription was sent to [facility], and all follow up appointments. IV removed. Patient is currently waiting on daughter to pick her up. Patient will leave via transport.

## 2017-08-18 NOTE — PLAN OF CARE
Problem: Physical Therapy Goal  Goal: Physical Therapy Goal  Goals to be met by: 2017     Patient will increase functional independence with mobility by performin. Sit to stand transfer with Modified Cochran  2. Bed to chair transfer with Modified Cochran using Rolling Walker  3. Gait  x 75 feet with Supervision using Rolling Walker.   4. Lower extremity exercise program x15 reps per handout, with independence    Outcome: Ongoing (interventions implemented as appropriate)  Evaluation complete. Goals appropriate to improve functional mobility.    Jone Hughes III, DPT  2017

## 2017-08-18 NOTE — DISCHARGE SUMMARY
"[facility] Hospital Medicine  Discharge Summary      Patient Name: Jahaira Garcia  MRN: [id number]  Admission Date: 8/16/2017  Hospital Length of Stay: 0 days  Discharge Date and Time:  08/18/2017 5:34 PM  Attending Physician: Hallie Huggins MD   Discharging Provider: Brenda Mclaughlin PA-C  Primary Care Provider: Gayathri Resendiz MD  Hospital Medicine Team: [facility] HOSP MED F Brenda Mclaughlin PA-C    HPI:   Patient is an 88 year old lady with a h/o DM II, pulmonary fibrosis, ILD, HTN, CKD III, A fib s/p PPM.  Patient states that she has had diarrhea for the past 2-3 days about three times a day.  She has chronic constipation and recently switched from Miralax to fiber.  Today, she states that she felt like she needed to have a BM and instead had a large blood clot (approximately 3-4" in diameter) and BRB on the toilet paper and in the toilet.  She continued to bleed afterwards.  She has not had any more bleeding since about 13:00.  She denies abdominal and rectal pain, melena, hematochezia, hematemesis, chest pain, worsened SOB, dizziness, palpitations, fever/chills, N/V.    * No surgery found *      Indwelling Lines/Drains at time of discharge:   Lines/Drains/Airways          No matching active lines, drains, or airways        Hospital Course:   Pt admitted observation with history of BRBPR prior to admission with some reported lightheadedness. She has never had this before.  Blood consent signed and type & screen ordered.  CRS consulted and on exam, no active bleeding although some clotted blood on anoscopy at her internal hemorrhoids. CRS placed a piece of quick-clot via the anoscope. It was thought that there may be irritation of her hemorrhoids secondary to diarrhea. Fiber supplementation and staying well hydrated, sitz baths as needed was discussed with the patient. Pt's history of multiple polyps on her colonoscopies in 2002 and 2003 was also discussed. Pt states that, even if we " found colon or rectal cancer, that she would adamantly refuse treatment. She can follow up with CRS as an outpatient for further evaluation of her hemorrhoids if pt desires. Day of discharge CRS examined pt as overnight it was reported BRB on tip of stool.  CRS agreeable to discharge, pt hemodynamically stable.  [facility] services arranged at discharge.       Consults:   Consults         Status Ordering Provider     Inpatient consult to Colorectal Surgery  Once     Provider:  (Not yet assigned)    Completed RENETTA SCOTT     Inpatient consult to Colorectal Surgery  Once     Provider:  (Not yet assigned)    Acknowledged GA CARTER     Inpatient consult to Social Work/Case Management  Once     Provider:  (Not yet assigned)    Acknowledged MARYANN MOREIRA          Significant Diagnostic Studies: Labs: All labs within the past 24 hours have been reviewed    Pending Diagnostic Studies:     None        Final Active Diagnoses:    Diagnosis Date Noted POA    CKD (chronic kidney disease), stage IV [N18.4] 08/31/2015 Yes     Chronic    Atrial fibrillation [I48.91] 10/21/2016 Yes     Chronic    HLD (hyperlipidemia) [E78.5] 09/19/2013 Yes     Chronic    Essential hypertension [I10] 08/01/2012 Yes     Chronic    Pulmonary fibrosis [J84.10] 08/01/2012 Yes     Chronic    Uncontrolled type 2 diabetes mellitus with stage 4 chronic kidney disease, with long-term current use of insulin [E11.22, E11.65, N18.4, Z79.4] 08/01/2012 Not Applicable     Chronic      Problems Resolved During this Admission:    Diagnosis Date Noted Date Resolved POA    PRINCIPAL PROBLEM:  Rectal bleeding [K62.5] 08/16/2017 08/18/2017 Yes      No new Assessment & Plan notes have been filed under this hospital service since the last note was generated.  Service: Hospital Medicine      Discharged Condition: good    [place] [facility]    Follow Up:  Follow-up Information     Valdo Li MD.    Specialty:  Colon and Rectal Surgery  Why:   "As needed if continued rectal bleeding or if she wishes to have a colonoscopy.  Contact information:  [address]  [facility] [address]  [phone]             Gayathri Resendiz MD In 1 week.    Specialty:  Internal Medicine  Contact information:  [address] [facility]  [place] [address]  [phone]                 Patient Instructions:     Ambulatory Referral to Colorectal Surgery   Referral Priority: Routine Referral Type: Consultation   Referral Reason: Specialty Services Required    Requested Specialty: Colon and Rectal Surgery    Number of Visits Requested: 1      Diet general   Order Comments: High fiber     Activity as tolerated     Call MD for:  difficulty breathing or increased cough     Call MD for:  persistent dizziness, light-headedness, or visual disturbances     Call MD for:  increased confusion or weakness     Call MD for:   Order Comments: Increased bleeding causing weakness, lightheadedness.  Follow up with colorectal surgery is recommended.       Medications:  Reconciled Home Medications:   Current Discharge Medication List      START taking these medications    Details   polyethylene glycol (GLYCOLAX) 17 gram PwPk Take 17 g by mouth 3 (three) times daily as needed.  Qty: 100 packet, Refills: 0      psyllium (METAMUCIL) packet Take 1 packet by mouth once daily.  Refills: 12         CONTINUE these medications which have NOT CHANGED    Details   acetaminophen (TYLENOL) 325 MG tablet Take 1-2 tablets (325-650 mg total) by mouth nightly as needed for Pain.      albuterol-ipratropium 2.5mg-0.5mg/3mL (DUO-NEB) 0.5 mg-3 mg(2.5 mg base)/3 mL nebulizer solution Take 3 mLs by nebulization every evening. And every 6 hours as needed.  Refills: 0      artificial tears (ISOPTO TEARS) 0.5 % ophthalmic solution Place 1 drop into both eyes as needed.      BD ULTRA-FINE CHARAN PEN NEEDLES 32 gauge x 5/32" Ndle USE ONE 3 TIMES DAILY  Qty: 300 each, Refills: 0      carvedilol (COREG) 3.125 MG " tablet Take 1 tablet (3.125 mg total) by mouth 2 (two) times daily.  Qty: 180 tablet, Refills: 0      FOLIC ACID/MULTIVIT-MIN/LUTEIN (CENTRUM SILVER ORAL) Take 1 tablet by mouth once daily.      insulin lispro (HUMALOG KWIKPEN) 100 unit/mL InPn pen Patient injects 18 units with breakfast, 18 units with lunch, and 18 units dinner.  Qty: 3 Box, Refills: 3    Comments: We have adjusted patient's insulin dose. She may not need refills at this time.      levalbuterol (XOPENEX) 0.63 mg/3 mL nebulizer solution USE ONE VIAL TWICE DAILY  Qty: 504 mL, Refills: 0    Associated Diagnoses: Cough with expectoration; Idiopathic fibrosing alveolitis, chronic form      losartan (COZAAR) 50 MG tablet Take 1 tablet (50 mg total) by mouth 2 (two) times daily with meals.  Qty: 90 tablet, Refills: 3      melatonin 1 mg Tab Take by mouth.      mupirocin (BACTROBAN) 2 % ointment Apply topically 3 (three) times daily.  Qty: 30 g, Refills: 0      ONETOUCH DELICA LANCETS 33 gauge Misc USE THREE TIMES DAILY  Qty: 200 each, Refills: 3      ONETOUCH VERIO Strp USE ONE 5 TIMES DAILY  Qty: 450 each, Refills: 12      pravastatin (PRAVACHOL) 20 MG tablet Take 1 tablet (20 mg total) by mouth once daily.  Qty: 90 tablet, Refills: 3      predniSONE (DELTASONE) 5 MG tablet       rivaroxaban (XARELTO) 15 mg Tab Take 1 tablet (15 mg total) by mouth daily with dinner or evening meal.  Qty: 30 tablet, Refills: 11    Associated Diagnoses: Atrial fibrillation, unspecified type      UNABLE TO FIND Take by mouth once daily. medication name: protandim           Time spent on the discharge of patient: >30 minutes    Discharged home with [facility]    HOS POC IP DISCHARGE SUMMARY    Brenda Mclaughlin PA-C  Department of Hospital Medicine  [facility]

## 2017-08-18 NOTE — PLAN OF CARE
Problem: Patient Care Overview  Goal: Plan of Care Review  Outcome: Ongoing (interventions implemented as appropriate)  Pt remained free from injury/falls this shift. VSS, afebrile. No c/o pain. CBG monitored per orders; WNL. No SSI coverage needed. Pt passed one pea-sized clot while attempting to make a BM and also noted a streak of dark red blood on toilet paper when wiping. No other s/sx of overt bleeding noted. Pt repositioned independently. Skin intact, no new breakdown noted. Bed locked in lowest position. SR upx2. Call light within reach. Will continue to monitor.

## 2017-08-18 NOTE — PROGRESS NOTES
Brenda ELLSWORTH notified that pt's bp was 193/77, Hr 73. Informed Shaila CALDERON she will continue to monitor. Brenda ELLSWORTH notified of PT/OT wanting to work with the pt. Brenda ELLSWORTH gave clearance for PT/OT to work with the pt.

## 2017-08-18 NOTE — PLAN OF CARE
[facility]    HOME HEALTH ORDERS  FACE TO FACE ENCOUNTER    Patient Name: Jahaira Garcia  YOB: 1928    PCP: Gayathri Resendiz MD   PCP Address: [address] [facility] / [place] [address]  PCP Phone Number: [phone]  PCP Fax: [phone]    Encounter Date: 08/18/2017    Admit to Home Health    Diagnoses:  Active Hospital Problems    Diagnosis  POA    CKD (chronic kidney disease), stage IV [N18.4]  Yes     Priority: 2      Chronic    Atrial fibrillation [I48.91]  Yes     Priority: 3      Chronic    HLD (hyperlipidemia) [E78.5]  Yes     Priority: 4      Chronic    Essential hypertension [I10]  Yes     Priority: 4      Chronic    Pulmonary fibrosis [J84.10]  Yes     Priority: 5      Chronic    Uncontrolled type 2 diabetes mellitus with stage 4 chronic kidney disease, with long-term current use of insulin [E11.22, E11.65, N18.4, Z79.4]  Not Applicable     Priority: 6      Chronic      Resolved Hospital Problems    Diagnosis Date Resolved POA    *Rectal bleeding [K62.5] 08/18/2017 Yes     Priority: 1 - High       Future Appointments  Date Time Provider Department Center   8/21/2017 2:30 PM Chayo Quigley MD [facility] NEPHRO [facility]   9/11/2017 10:40 AM Joceline Resendiz MD [facility] IM [place]   9/26/2017 2:30 PM Gonzalo Gordon MD [facility] PULMSVC [facility]   11/14/2017 3:30 PM Cornelius Calhoun MD [facility] UROGYN [facility]     Follow-up Information     Valdo Li MD.    Specialty:  Colon and Rectal Surgery  Why:  As needed if continued rectal bleeding or if she wishes to have a colonoscopy.  Contact information:  [address]  [facility] [address]  [phone]             Gayathri Resendiz MD In 1 week.    Specialty:  Internal Medicine  Contact information:  [address] [facility]  [place] [address]  [phone]                     I have seen and examined this patient face to face today. My clinical findings that support the  need for the home health skilled services and home bound status are the following:  Weakness/numbness causing balance and gait disturbance due to Weakness/Debility making it taxing to leave home.    Allergies:  Review of patient's allergies indicates:   Allergen Reactions    Tramadol      Confusion and sleeping and unsteady.       Diet: high fiber    Activities: activity as tolerated    Nursing:   SN to complete comprehensive assessment including routine vital signs. Instruct on disease process and s/s of complications to report to MD. Review/verify medication list sent home with the patient at time of discharge  and instruct patient/caregiver as needed. Frequency may be adjusted depending on start of care date.    Notify MD if SBP > 160 or < 90; DBP > 90 or < 50; HR > 120 or < 50; Temp > 101; Other:   Oxygen saturation less than 89%      CONSULTS:    Physical Therapy to evaluate and treat. Evaluate for home safety and equipment needs; Establish/upgrade home exercise program. Perform / instruct on therapeutic exercises, gait training, transfer training, and Range of Motion.  Occupational Therapy to evaluate and treat. Evaluate home environment for safety and equipment needs. Perform/Instruct on transfers, ADL training, ROM, and therapeutic exercises.   to evaluate for community resources/long-range planning.  Aide to provide assistance with personal care, ADLs, and vital signs.    MISCELLANEOUS CARE:  Home Oxygen:  Oxygen at 3 L/min nasal canula to be used:  Continuously.    WOUND CARE ORDERS  n/a      Medications: Review discharge medications with patient and family and provide education.      Current Discharge Medication List      START taking these medications    Details   polyethylene glycol (GLYCOLAX) 17 gram PwPk Take 17 g by mouth 3 (three) times daily as needed.  Qty: 100 packet, Refills: 0      psyllium (METAMUCIL) packet Take 1 packet by mouth once daily.  Refills: 12         CONTINUE these  "medications which have NOT CHANGED    Details   acetaminophen (TYLENOL) 325 MG tablet Take 1-2 tablets (325-650 mg total) by mouth nightly as needed for Pain.      albuterol-ipratropium 2.5mg-0.5mg/3mL (DUO-NEB) 0.5 mg-3 mg(2.5 mg base)/3 mL nebulizer solution Take 3 mLs by nebulization every evening. And every 6 hours as needed.  Refills: 0      artificial tears (ISOPTO TEARS) 0.5 % ophthalmic solution Place 1 drop into both eyes as needed.      BD ULTRA-FINE CHARAN PEN NEEDLES 32 gauge x 5/32" Ndle USE ONE 3 TIMES DAILY  Qty: 300 each, Refills: 0      carvedilol (COREG) 3.125 MG tablet Take 1 tablet (3.125 mg total) by mouth 2 (two) times daily.  Qty: 180 tablet, Refills: 0      FOLIC ACID/MULTIVIT-MIN/LUTEIN (CENTRUM SILVER ORAL) Take 1 tablet by mouth once daily.      insulin lispro (HUMALOG KWIKPEN) 100 unit/mL InPn pen Patient injects 18 units with breakfast, 18 units with lunch, and 18 units dinner.  Qty: 3 Box, Refills: 3    Comments: We have adjusted patient's insulin dose. She may not need refills at this time.      levalbuterol (XOPENEX) 0.63 mg/3 mL nebulizer solution USE ONE VIAL TWICE DAILY  Qty: 504 mL, Refills: 0    Associated Diagnoses: Cough with expectoration; Idiopathic fibrosing alveolitis, chronic form      losartan (COZAAR) 50 MG tablet Take 1 tablet (50 mg total) by mouth 2 (two) times daily with meals.  Qty: 90 tablet, Refills: 3      melatonin 1 mg Tab Take by mouth.      mupirocin (BACTROBAN) 2 % ointment Apply topically 3 (three) times daily.  Qty: 30 g, Refills: 0      ONETOUCH DELICA LANCETS 33 gauge Misc USE THREE TIMES DAILY  Qty: 200 each, Refills: 3      ONETOUCH VERIO Strp USE ONE 5 TIMES DAILY  Qty: 450 each, Refills: 12      pravastatin (PRAVACHOL) 20 MG tablet Take 1 tablet (20 mg total) by mouth once daily.  Qty: 90 tablet, Refills: 3      predniSONE (DELTASONE) 5 MG tablet       rivaroxaban (XARELTO) 15 mg Tab Take 1 tablet (15 mg total) by mouth daily with dinner or evening " meal.  Qty: 30 tablet, Refills: 11    Associated Diagnoses: Atrial fibrillation, unspecified type      UNABLE TO FIND Take by mouth once daily. medication name: protandim             I certify that this patient is confined to her home and needs intermittent skilled nursing care, physical therapy and occupational therapy.  Therapy four times per week.

## 2017-08-21 ENCOUNTER — TELEPHONE (OUTPATIENT)
Dept: INTERNAL MEDICINE | Facility: CLINIC | Age: 82
End: 2017-08-21

## 2017-08-21 ENCOUNTER — OFFICE VISIT (OUTPATIENT)
Dept: NEPHROLOGY | Facility: CLINIC | Age: 82
End: 2017-08-21
Payer: MEDICARE

## 2017-08-21 VITALS
HEART RATE: 73 BPM | SYSTOLIC BLOOD PRESSURE: 138 MMHG | OXYGEN SATURATION: 91 % | WEIGHT: 140 LBS | HEIGHT: 62 IN | BODY MASS INDEX: 25.76 KG/M2 | DIASTOLIC BLOOD PRESSURE: 60 MMHG

## 2017-08-21 DIAGNOSIS — E78.5 HYPERLIPIDEMIA, UNSPECIFIED HYPERLIPIDEMIA TYPE: Chronic | ICD-10-CM

## 2017-08-21 DIAGNOSIS — N18.4 CKD (CHRONIC KIDNEY DISEASE), STAGE IV: Primary | Chronic | ICD-10-CM

## 2017-08-21 DIAGNOSIS — I10 ESSENTIAL HYPERTENSION: Chronic | ICD-10-CM

## 2017-08-21 PROCEDURE — 99999 PR PBB SHADOW E&M-EST. PATIENT-LVL IV: CPT | Mod: PBBFAC,,, | Performed by: INTERNAL MEDICINE

## 2017-08-21 PROCEDURE — 99214 OFFICE O/P EST MOD 30 MIN: CPT | Mod: PBBFAC | Performed by: INTERNAL MEDICINE

## 2017-08-21 PROCEDURE — 1157F ADVNC CARE PLAN IN RCRD: CPT | Mod: ,,, | Performed by: INTERNAL MEDICINE

## 2017-08-21 PROCEDURE — 1159F MED LIST DOCD IN RCRD: CPT | Mod: ,,, | Performed by: INTERNAL MEDICINE

## 2017-08-21 PROCEDURE — 99203 OFFICE O/P NEW LOW 30 MIN: CPT | Mod: S$PBB,,, | Performed by: INTERNAL MEDICINE

## 2017-08-21 PROCEDURE — 1125F AMNT PAIN NOTED PAIN PRSNT: CPT | Mod: ,,, | Performed by: INTERNAL MEDICINE

## 2017-08-21 NOTE — LETTER
August 21, 2017      Dominik Mchugh MD  [address] [facility]  [place] [address]           [facility]  [address]  [place] [address]  Phone: [phone]  Fax: [phone]          Patient: Jahaira Garcia   MR Number: [id number]   YOB: 1928   Date of Visit: 8/21/2017       Dear Dr. Dominik Mchugh:    Thank you for referring Jahaira Garcia to me for evaluation. Attached you will find relevant portions of my assessment and plan of care.    If you have questions, please do not hesitate to call me. I look forward to following Jahaira Garcia along with you.    Sincerely,    Chayo Quigley MD    Enclosure  CC:  No Recipients    If you would like to receive this communication electronically, please contact [email or web address] or [phone] to request more information on [employer] Link access.    For providers and/or their staff who would like to refer a patient to [facility], please contact us through our one-stop-shop provider referral line, [facility] , at [phone].    If you feel you have received this communication in error or would no longer like to receive these types of communications, please e-mail [email or web address]

## 2017-08-21 NOTE — PROGRESS NOTES
"Subjective:       Patient ID: Jahaira Garcia is a 88 y.o. White female who presents for new evaluation of renal function  HPI   Patient is an 88 year old lady with a h/o DM II, pulmonary fibrosis, ILD, pulmonary hypertension with a left ventricular ejection fraction of 68 and PA pressures of 66 HTN, CKD III, A fib s/p PPM.  Discharged yesterday from [facility] after evaluation for bright red blood per rectum which appeared to be due to hemorrhoids.  Baseline serum creatinine 1.3-1.6.  Last microalbumin was 48 ug/mg crt  back in 2014.  There is no renal imaging available. Hbg 8.1 8/18/2017.        Review of Systems   Constitutional: Negative for appetite change, chills, fatigue, fever and unexpected weight change.   HENT: Negative for congestion, facial swelling, hearing loss, nosebleeds and trouble swallowing.    Eyes: Negative for pain, discharge, redness and visual disturbance.   Respiratory: Positive for shortness of breath. Negative for cough, chest tightness and wheezing.    Cardiovascular: Negative for chest pain, palpitations and leg swelling.   Gastrointestinal: Negative for abdominal pain, constipation, diarrhea, nausea and vomiting.   Endocrine: Negative for cold intolerance, heat intolerance and polydipsia.   Genitourinary: Negative for decreased urine volume, difficulty urinating, dysuria, flank pain, hematuria and urgency.   Musculoskeletal: Positive for arthralgias and back pain. Negative for joint swelling and myalgias.   Skin: Negative for color change, pallor, rash and wound.   Neurological: Negative for dizziness, tremors, seizures, syncope, speech difficulty, weakness and headaches.   Hematological: Negative for adenopathy. Does not bruise/bleed easily.   Psychiatric/Behavioral: Negative for agitation, behavioral problems, dysphoric mood, self-injury and sleep disturbance.       Objective:     Blood pressure 138/60, pulse 73, height 5' 2" (1.575 m), weight 63.5 kg (139 lb 15.9 oz), SpO2 (!) 91 " %.      Physical Exam   Constitutional: She is oriented to person, place, and time. She appears well-developed and well-nourished. No distress.   Chronically ill pale sitting in wheelchair complaining of right buttock sciatica-like pain, on oxygen therapy   HENT:   Head: Normocephalic and atraumatic.   Mouth/Throat: No oropharyngeal exudate.   Eyes: Conjunctivae and EOM are normal. Pupils are equal, round, and reactive to light. Right eye exhibits no discharge. Left eye exhibits no discharge. No scleral icterus.   Neck: Normal range of motion. Neck supple. No JVD present. No tracheal deviation present. No thyromegaly present.   Cardiovascular: Normal rate and regular rhythm.  Exam reveals no gallop.    No murmur heard.  2/6 systolic ejection murmur distant  +1 bilateral lower extremity edema   Pulmonary/Chest: No stridor. No respiratory distress. She has no wheezes. She has no rales. She exhibits no tenderness.   Poor excursion poor effort throughout bilateral decreased breath sounds with chronic crackles throughout both posterior lung fields   Abdominal: Soft. Bowel sounds are normal. She exhibits no distension and no mass. There is no tenderness. There is no rebound and no guarding. No hernia.   Musculoskeletal: She exhibits no edema or tenderness.   Lymphadenopathy:     She has no cervical adenopathy.   Neurological: She is alert and oriented to person, place, and time. She exhibits normal muscle tone.   Skin: Skin is warm and dry. No rash noted. She is not diaphoretic. No erythema.   Psychiatric: She has a normal mood and affect. Judgment and thought content normal.   Nursing note and vitals reviewed.      Assessment:       1. CKD (chronic kidney disease), stage IV    2. Hyperlipidemia, unspecified hyperlipidemia type    3. Essential hypertension    4. Uncontrolled type 2 diabetes mellitus with stage 4 chronic kidney disease, with long-term current use of insulin        Plan:       Patient is an 88 year old lady  with a h/o DM II, pulmonary fibrosis, ILD, pulmonary hypertension, HTN, CKD III, A fib s/p PPM, here for evaluation of renal function    CK D stage III to 4 with a serum creatinine of 1.3-1.6 estimated GFR anywhere between 25 and 35 cc/m now appears that serum creatinine has improved after hospitalization with a serum creatinine of 1.3 estimated GFR 35 cc/m.  No history of proteinuria the patient is maintained on R AAS in addition and serum potassiums appear normal.    Etiology of renal insufficiency is multifactorial related to probable diabetic nephropathy hypertension with nephrosclerosis.  Further evaluation and management of her renal insufficiency are tempered due to her frailty nor does she want an in-depth workup.  I had suggested at some point a renal ultrasound.      Hemoglobin A1c is 8 keeping this below 7.5 may be beneficial to slow progression of CK D however given the patient's frailty and multiple comorbid conditions aggressive management of her blood sugars may be more harmful than beneficial.  Metabolic acidosis currently none    Electrolyte stable    Anemia hemoglobin is 8 we'll check iron stores given that she may have had bleeding from hemorrhoids oral iron may be relatively contraindicated.  If stores are low she may benefit from intravenous iron therapy..   Further workup for GI bleed as per PCP    No acute need for for renal replacement therapy at this time.   the patient is also not interested in renal replacement options for the future.  Given her overall frailty, age and comorbid conditions she would not be a good candidate for renal replacement therapy    In the next 2 weeks check CBC iron studies renal function panel.    Return to clinic 3-4 months RFP and CBC prior

## 2017-08-21 NOTE — PATIENT INSTRUCTIONS
1. Labs: in 2 weeks with iron studies    2.  Medications: no Protandim          6. Diet:  National Kidney Foundation:  www.kidney.org       Sodium: < 2000 milligrams daily including all food and drink      (one teaspoon of table salt has 2300 milligrams of sodium)          Phosphorus: <1000mg daily       Vaccines: please check with your PCP and be sure to have an annual flu vaccine and keep up to date with your pneumococcal vaccine for pneumonia      Please avoid or minimize all NSAIDS (ibuprofen, motrin, aleve, indocin, naprosyn, BC powder, mobic, relafen, alleve, and any others) to minimize the risk to your kidneys    Please avoid Proton pump inhibitors unless specifically necessary and speak with your PCP about alternatives such as H2 blockers    Avoid magnesium based laxatives and phosphate based laxatives and enemas     Return to clinic: 4-6 months

## 2017-08-22 NOTE — PT/OT/SLP DISCHARGE
Physical Therapy Discharge Summary    Jahaira Garcia  MRN: [id number]   Rectal bleeding   Patient Discharged from acute Physical Therapy on 2017.  Please refer to prior PT noted date on 2017 for functional status.     Assessment:   Patient appropriate for care in another setting.  GOALS:    Physical Therapy Goals        Problem: Physical Therapy Goal    Goal Priority Disciplines Outcome Goal Variances Interventions   Physical Therapy Goal     PT/OT, PT Ongoing (interventions implemented as appropriate)     Description:  Goals to be met by: 2017     Patient will increase functional independence with mobility by performin. Sit to stand transfer with Modified [place]  2. Bed to chair transfer with Modified [place] using Rolling Walker  3. Gait  x 75 feet with Supervision using Rolling Walker.   4. Lower extremity exercise program x15 reps per handout, with independence                    Reasons for Discontinuation of Therapy Services  Transfer to alternate level of care.      Plan:  Patient Discharged to: Home with Home Health Service. PT recommends SNF to improve functional mobility; however, patient DC'ed home with  therapy.    Jone Hughes III, DPT  2017

## 2017-08-23 ENCOUNTER — TELEPHONE (OUTPATIENT)
Dept: INTERNAL MEDICINE | Facility: CLINIC | Age: 82
End: 2017-08-23

## 2017-08-23 ENCOUNTER — OFFICE VISIT (OUTPATIENT)
Dept: SURGERY | Facility: CLINIC | Age: 82
End: 2017-08-23
Payer: MEDICARE

## 2017-08-23 VITALS — SYSTOLIC BLOOD PRESSURE: 160 MMHG | HEIGHT: 62 IN | HEART RATE: 70 BPM | DIASTOLIC BLOOD PRESSURE: 59 MMHG

## 2017-08-23 DIAGNOSIS — K92.1 HEMATOCHEZIA: Primary | ICD-10-CM

## 2017-08-23 PROCEDURE — 99212 OFFICE O/P EST SF 10 MIN: CPT | Mod: PBBFAC | Performed by: COLON & RECTAL SURGERY

## 2017-08-23 PROCEDURE — 99203 OFFICE O/P NEW LOW 30 MIN: CPT | Mod: S$PBB,,, | Performed by: COLON & RECTAL SURGERY

## 2017-08-23 PROCEDURE — 1125F AMNT PAIN NOTED PAIN PRSNT: CPT | Mod: ,,, | Performed by: COLON & RECTAL SURGERY

## 2017-08-23 PROCEDURE — 1159F MED LIST DOCD IN RCRD: CPT | Mod: ,,, | Performed by: COLON & RECTAL SURGERY

## 2017-08-23 PROCEDURE — 1157F ADVNC CARE PLAN IN RCRD: CPT | Mod: ,,, | Performed by: COLON & RECTAL SURGERY

## 2017-08-23 PROCEDURE — 99999 PR PBB SHADOW E&M-EST. PATIENT-LVL II: CPT | Mod: PBBFAC,,, | Performed by: COLON & RECTAL SURGERY

## 2017-08-23 NOTE — LETTER
August 23, 2017      Brenda Mclaughlin PA-C  [address]  [facility] [address]           [facility]  [address]  [facility] [address]  Phone: [phone]          Patient: Jahaira Garcia   MR Number: [id number]   YOB: 1928   Date of Visit: 8/23/2017       Dear Brenda Mclaughlin:    Thank you for referring Jahaira Garcia to me for evaluation. Attached you will find relevant portions of my assessment and plan of care.    If you have questions, please do not hesitate to call me. I look forward to following Jahaira Garcia along with you.    Sincerely,    GAGE Bear MD    Enclosure  CC:  No Recipients    If you would like to receive this communication electronically, please contact [email or web address] or [phone] to request more information on [facility] access.    For providers and/or their staff who would like to refer a patient to [facility], please contact us through our one-stop-shop provider referral line, [facility], at [phone].    If you feel you have received this communication in error or would no longer like to receive these types of communications, please e-mail [email or web address]

## 2017-08-23 NOTE — PROGRESS NOTES
"Reports bleeding with bowel movements into the toilet bowl.  Bright red.  Denies pain.  Denies prolapse.    Past Medical History:   Diagnosis Date    Arthritis     Basal cell carcinoma     Complete heart block 2016    Diabetes mellitus type II     GERD (gastroesophageal reflux disease)     Hiatal hernia     HTN (hypertension)     Hyperlipidemia     Lung disease, interstitial     OA (osteoarthritis)     Pulmonary fibrosis     PVC (premature ventricular contraction)     Steroid long-term use 11/8/2012     Social History     Social History    Marital status:      Spouse name: N/A    Number of children: N/A    Years of education: N/A     Occupational History    retired      Social History Main Topics    Smoking status: Never Smoker    Smokeless tobacco: Never Used    Alcohol use No    Drug use: No    Sexual activity: No     Other Topics Concern    Not on file     Social History Narrative    Lives alone     Current Outpatient Prescriptions on File Prior to Visit   Medication Sig Dispense Refill    acetaminophen (TYLENOL) 325 MG tablet Take 1-2 tablets (325-650 mg total) by mouth nightly as needed for Pain.      albuterol-ipratropium 2.5mg-0.5mg/3mL (DUO-NEB) 0.5 mg-3 mg(2.5 mg base)/3 mL nebulizer solution Take 3 mLs by nebulization every evening. And every 6 hours as needed.  0    artificial tears (ISOPTO TEARS) 0.5 % ophthalmic solution Place 1 drop into both eyes as needed.      BD ULTRA-FINE CHARAN PEN NEEDLES 32 gauge x 5/32" Ndle USE ONE 3 TIMES DAILY 300 each 0    carvedilol (COREG) 3.125 MG tablet Take 1 tablet (3.125 mg total) by mouth 2 (two) times daily. 180 tablet 0    FOLIC ACID/MULTIVIT-MIN/LUTEIN (CENTRUM SILVER ORAL) Take 1 tablet by mouth once daily.      insulin lispro (HUMALOG KWIKPEN) 100 unit/mL InPn pen Patient injects 18 units with breakfast, 18 units with lunch, and 18 units dinner. 3 Box 3    levalbuterol (XOPENEX) 0.63 mg/3 mL nebulizer solution USE ONE VIAL " TWICE DAILY 504 mL 0    losartan (COZAAR) 50 MG tablet Take 1 tablet (50 mg total) by mouth 2 (two) times daily with meals. 90 tablet 3    melatonin 1 mg Tab Take by mouth.      mupirocin (BACTROBAN) 2 % ointment Apply topically 3 (three) times daily. 30 g 0    ONETOUCH DELICA LANCETS 33 gauge Misc USE THREE TIMES DAILY 200 each 3    ONETOUCH VERIO Strp USE ONE 5 TIMES DAILY 450 each 12    polyethylene glycol (GLYCOLAX) 17 gram PwPk Take 17 g by mouth 3 (three) times daily as needed. 100 packet 0    pravastatin (PRAVACHOL) 20 MG tablet Take 1 tablet (20 mg total) by mouth once daily. 90 tablet 3    predniSONE (DELTASONE) 5 MG tablet       psyllium (METAMUCIL) packet Take 1 packet by mouth once daily.  12    rivaroxaban (XARELTO) 15 mg Tab Take 1 tablet (15 mg total) by mouth daily with dinner or evening meal. 30 tablet 11    UNABLE TO FIND Take by mouth once daily. medication name: protandim       No current facility-administered medications on file prior to visit.      Review of patient's allergies indicates:   Allergen Reactions    Tramadol      Confusion and sleeping and unsteady.     Family History   Problem Relation Age of Onset    Tuberculosis Mother     Tuberculosis Father      Social History     Social History    Marital status:      Spouse name: N/A    Number of children: N/A    Years of education: N/A     Occupational History    retired      Social History Main Topics    Smoking status: Never Smoker    Smokeless tobacco: Never Used    Alcohol use No    Drug use: No    Sexual activity: No     Other Topics Concern    Not on file     Social History Narrative    Lives alone     ROS:  GENERAL: No fever, chills, fatigability or weight loss.  Integument:  No rashes, redness, icterus  CHEST: Denies GAITAN, cyanosis, wheezing, cough and sputum production.  CARDIOVASCULAR: Denies chest pain, PND, orthopnea or reduced exercise tolerance.  GI:  Denies abd pain, dysphagia, nausea, vomiting,  "no hematemesis, no rectal pain  : Denies burning on urination, no hematuria, no bacteriuria  MSK:  No deformities, swelling; hip leg pain +  Neurologic:  No HAs, seizures, weakness, paresthesias, gait problems    Physical exam  Elderly female debilitated in no acute distress  BP (!) 160/59   Pulse 70   Ht 5' 2.01" (1.575 m)   LMP  (LMP Unknown)   Skin anicteric   atraumatic normocephalic  Rectal exam  Normal-appearing anus no fissures or hemorrhoids  KAREN normal tone, no masses.  Old blood on finger     assessment   Hematochezia   History of multiple colonic polyps     Recommend   Discussed with patient and family.  Given multiple polyps in past patient needs colonoscopy for possible repeat polypectomy       Anoscopy Procedure Note:   Verbal consent obtained  Indications:  Rectal bleeding  Post procedure diagnosis:  Mild hemorrhoids  Procedure: anoscopy  Surgeon ARISTIDES  Assistant: Huynh  Specimen none  Findings:  Hemorrhoids, internal - NOT bleeding  Right posterior hemorrhoid grade grade 2  Right anterior hemorrhoid grade grade 2  Left lateral hemorrhoid grade grade 2  Other findings:  None  Pt tolerated procedure well.    No complications.          "

## 2017-08-23 NOTE — Clinical Note
Sohail Trevino -I don't think this is coming from hemorrhoids and given her history of multiple polyps unfortunately we should do colonoscopy.  I know she was disappointed.  I will let you know what we find.  Michael

## 2017-08-23 NOTE — TELEPHONE ENCOUNTER
Spoke to Amanda with [facility]. Her contact is [phone], ext [phone]. She stated that the pt has the SN, PT, & OT at her home. She was requesting a ST eval for a swallowing, since the pt is having difficulty swallowing.   I gave the verbal ok on this.

## 2017-08-24 ENCOUNTER — TELEPHONE (OUTPATIENT)
Dept: INTERNAL MEDICINE | Facility: CLINIC | Age: 82
End: 2017-08-24

## 2017-08-24 ENCOUNTER — TELEPHONE (OUTPATIENT)
Dept: ENDOSCOPY | Facility: HOSPITAL | Age: 82
End: 2017-08-24

## 2017-08-24 ENCOUNTER — TELEPHONE (OUTPATIENT)
Dept: SURGERY | Facility: CLINIC | Age: 82
End: 2017-08-24

## 2017-08-24 DIAGNOSIS — K62.5 RECTAL BLEEDING: ICD-10-CM

## 2017-08-24 DIAGNOSIS — Z86.010 HX OF COLONIC POLYPS: Primary | ICD-10-CM

## 2017-08-24 NOTE — TELEPHONE ENCOUNTER
This pt needs to be scheduled for a colonoscopy, not booked yet. She is on Xarelto and we need to know if she can hold med 2 days prior to procedure. Please message back with response for documentation.

## 2017-08-24 NOTE — TELEPHONE ENCOUNTER
Pt was seen by Dr Bear yesterday and her daughter is calling to schedule a colonoscopy. We do not have an order, she will need to be done on 2nd floor due to her many medical issues. Also daughter asked if she needed blood work prior to colonoscopy. Please let her daughter Nasreen Ceron know when order is entered so she can be booked. She can be reached at [phone].

## 2017-08-24 NOTE — TELEPHONE ENCOUNTER
Returned call to Sumanth Mccauley with [facility]. No answer. Voicemail box full. Unable to leave a message.

## 2017-08-24 NOTE — TELEPHONE ENCOUNTER
----- Message from Kenan Dodson sent at 8/24/2017 10:57 AM CDT -----  Contact: Sumanth Mccauley with [facility] [phone]  Sumanth would like to speak with the nurse regarding getting the pts wound care order,please call

## 2017-08-24 NOTE — TELEPHONE ENCOUNTER
----- Message from Kenan Dodson sent at 8/24/2017 10:57 AM CDT -----  Contact: Sumanth Mccauley with [facility] [phone]  Sumanth would like to speak with the nurse regarding getting the pts wound care order,please call

## 2017-08-24 NOTE — TELEPHONE ENCOUNTER
----- Message from Poonam Allen sent at 8/24/2017 11:47 AM CDT -----  Contact: Amanda w [facility] [phone] ext [phone]  Requesting a verbal authorization for a speech therapist. Pt is unable to swallow. Please advise

## 2017-08-24 NOTE — TELEPHONE ENCOUNTER
Spoke to Sumanth with [facility]. She stated that the pt's wound is very superficial. She stated that she put neosporin on it, a simple dressing, and an OTC pain patch that was previously what she was using.   Sumanth is recommending to start using a methoplex dressing to the area since it's a foam dressing.  I gave the verbal ok for this.

## 2017-08-25 ENCOUNTER — TELEPHONE (OUTPATIENT)
Dept: ENDOSCOPY | Facility: HOSPITAL | Age: 82
End: 2017-08-25

## 2017-08-25 ENCOUNTER — TELEPHONE (OUTPATIENT)
Dept: ELECTROPHYSIOLOGY | Facility: CLINIC | Age: 82
End: 2017-08-25

## 2017-08-25 DIAGNOSIS — K62.5 RECTAL BLEEDING: Primary | ICD-10-CM

## 2017-08-25 RX ORDER — POLYETHYLENE GLYCOL 3350, SODIUM SULFATE ANHYDROUS, SODIUM BICARBONATE, SODIUM CHLORIDE, POTASSIUM CHLORIDE 236; 22.74; 6.74; 5.86; 2.97 G/4L; G/4L; G/4L; G/4L; G/4L
4 POWDER, FOR SOLUTION ORAL ONCE
Qty: 4000 ML | Refills: 0 | Status: SHIPPED | OUTPATIENT
Start: 2017-08-25 | End: 2017-08-25

## 2017-08-25 NOTE — TELEPHONE ENCOUNTER
This pt is coming in on Monday 8/28/17 for her colonoscopy. She is a 2nd floor case checking in at 6:00am for a 7:00am colon, she is holding Xarelto 2 days prior. Please let Dr Bear know this case was added on for Monday!

## 2017-08-25 NOTE — TELEPHONE ENCOUNTER
----- Message from Cornelius Ann MD sent at 8/25/2017  1:11 PM CDT -----  ok    ----- Message -----  From: Shiloh Armstrong  Sent: 8/25/2017  12:11 PM  To: Cornelius Ann MD, Placido Brooks    Remote Update:    Alert for V % pacing greater than limit.    Since implant on 6/24/16  has been recorded as 0% and is now 47%.  Appears to have started mid-May according to the daily percent pacing trend.  It also appears to be trending down.    Thanks,  Shiloh

## 2017-08-27 ENCOUNTER — NURSE TRIAGE (OUTPATIENT)
Dept: ADMINISTRATIVE | Facility: CLINIC | Age: 82
End: 2017-08-27

## 2017-08-28 ENCOUNTER — TELEPHONE (OUTPATIENT)
Dept: ELECTROPHYSIOLOGY | Facility: CLINIC | Age: 82
End: 2017-08-28

## 2017-08-28 ENCOUNTER — ANESTHESIA (OUTPATIENT)
Dept: ENDOSCOPY | Facility: HOSPITAL | Age: 82
End: 2017-08-28
Payer: MEDICARE

## 2017-08-28 ENCOUNTER — ANESTHESIA EVENT (OUTPATIENT)
Dept: ENDOSCOPY | Facility: HOSPITAL | Age: 82
End: 2017-08-28
Payer: MEDICARE

## 2017-08-28 ENCOUNTER — HOSPITAL ENCOUNTER (OUTPATIENT)
Facility: HOSPITAL | Age: 82
Discharge: HOME | End: 2017-08-28
Attending: COLON & RECTAL SURGERY | Admitting: COLON & RECTAL SURGERY
Payer: MEDICARE

## 2017-08-28 VITALS
HEART RATE: 65 BPM | WEIGHT: 130 LBS | SYSTOLIC BLOOD PRESSURE: 151 MMHG | BODY MASS INDEX: 25.52 KG/M2 | TEMPERATURE: 97 F | HEIGHT: 60 IN | RESPIRATION RATE: 16 BRPM | DIASTOLIC BLOOD PRESSURE: 60 MMHG | OXYGEN SATURATION: 100 %

## 2017-08-28 DIAGNOSIS — R29.6 RECURRENT FALLS: Primary | ICD-10-CM

## 2017-08-28 DIAGNOSIS — K64.8 INTERNAL HEMORRHOID, BLEEDING: Chronic | ICD-10-CM

## 2017-08-28 LAB
POCT GLUCOSE: 234 MG/DL (ref 70–110)
POCT GLUCOSE: 237 MG/DL (ref 70–110)

## 2017-08-28 PROCEDURE — 45385 COLONOSCOPY W/LESION REMOVAL: CPT | Mod: ,,, | Performed by: COLON & RECTAL SURGERY

## 2017-08-28 PROCEDURE — 25000003 PHARM REV CODE 250: Performed by: COLON & RECTAL SURGERY

## 2017-08-28 PROCEDURE — 63600175 PHARM REV CODE 636 W HCPCS: Performed by: NURSE ANESTHETIST, CERTIFIED REGISTERED

## 2017-08-28 PROCEDURE — 37000008 HC ANESTHESIA 1ST 15 MINUTES: Performed by: COLON & RECTAL SURGERY

## 2017-08-28 PROCEDURE — 37000009 HC ANESTHESIA EA ADD 15 MINS: Performed by: COLON & RECTAL SURGERY

## 2017-08-28 PROCEDURE — 27201089 HC SNARE, DISP (ANY): Performed by: COLON & RECTAL SURGERY

## 2017-08-28 PROCEDURE — D9220A PRA ANESTHESIA: Mod: ANES,,, | Performed by: ANESTHESIOLOGY

## 2017-08-28 PROCEDURE — 88305 TISSUE EXAM BY PATHOLOGIST: CPT | Performed by: PATHOLOGY

## 2017-08-28 PROCEDURE — 82962 GLUCOSE BLOOD TEST: CPT | Performed by: COLON & RECTAL SURGERY

## 2017-08-28 PROCEDURE — 88305 TISSUE EXAM BY PATHOLOGIST: CPT | Mod: 26,,, | Performed by: PATHOLOGY

## 2017-08-28 PROCEDURE — D9220A PRA ANESTHESIA: Mod: CRNA,,, | Performed by: NURSE ANESTHETIST, CERTIFIED REGISTERED

## 2017-08-28 PROCEDURE — 45385 COLONOSCOPY W/LESION REMOVAL: CPT | Performed by: COLON & RECTAL SURGERY

## 2017-08-28 RX ORDER — SODIUM CHLORIDE 0.9 % (FLUSH) 0.9 %
3 SYRINGE (ML) INJECTION
Status: DISCONTINUED | OUTPATIENT
Start: 2017-08-28 | End: 2017-08-28 | Stop reason: HOSPADM

## 2017-08-28 RX ORDER — SODIUM CHLORIDE 9 MG/ML
INJECTION, SOLUTION INTRAVENOUS CONTINUOUS
Status: DISCONTINUED | OUTPATIENT
Start: 2017-08-28 | End: 2017-08-28 | Stop reason: HOSPADM

## 2017-08-28 RX ORDER — MEPERIDINE HYDROCHLORIDE 50 MG/ML
12.5 INJECTION INTRAMUSCULAR; INTRAVENOUS; SUBCUTANEOUS ONCE AS NEEDED
Status: DISCONTINUED | OUTPATIENT
Start: 2017-08-28 | End: 2017-08-28 | Stop reason: HOSPADM

## 2017-08-28 RX ORDER — PROPOFOL 10 MG/ML
INJECTION, EMULSION INTRAVENOUS
Status: DISCONTINUED | OUTPATIENT
Start: 2017-08-28 | End: 2017-08-28

## 2017-08-28 RX ORDER — LIDOCAINE HCL/PF 100 MG/5ML
SYRINGE (ML) INTRAVENOUS
Status: DISCONTINUED | OUTPATIENT
Start: 2017-08-28 | End: 2017-08-28

## 2017-08-28 RX ORDER — PROPOFOL 10 MG/ML
INJECTION, EMULSION INTRAVENOUS CONTINUOUS PRN
Status: DISCONTINUED | OUTPATIENT
Start: 2017-08-28 | End: 2017-08-28

## 2017-08-28 RX ORDER — PHENYLEPHRINE HYDROCHLORIDE 10 MG/ML
INJECTION INTRAVENOUS
Status: DISCONTINUED | OUTPATIENT
Start: 2017-08-28 | End: 2017-08-28

## 2017-08-28 RX ADMIN — LIDOCAINE HYDROCHLORIDE 60 MG: 20 INJECTION, SOLUTION INTRAVENOUS at 07:08

## 2017-08-28 RX ADMIN — PHENYLEPHRINE HYDROCHLORIDE 100 MCG: 10 INJECTION INTRAVENOUS at 08:08

## 2017-08-28 RX ADMIN — PROPOFOL 40 MG: 10 INJECTION, EMULSION INTRAVENOUS at 07:08

## 2017-08-28 RX ADMIN — PROPOFOL 100 MCG/KG/MIN: 10 INJECTION, EMULSION INTRAVENOUS at 07:08

## 2017-08-28 RX ADMIN — SODIUM CHLORIDE: 0.9 INJECTION, SOLUTION INTRAVENOUS at 07:08

## 2017-08-28 NOTE — TRANSFER OF CARE
Anesthesia Transfer of Care Note    Patient: Jahaira GOLDEN Parkman    Procedure(s) Performed: Procedure(s) (LRB):  COLONOSCOPY (N/A)    Patient location: PACU    Anesthesia Type: general    Transport from OR: Transported from OR on 100% O2 by closed face mask with adequate spontaneous ventilation    Post pain: adequate analgesia    Post assessment: no apparent anesthetic complications and tolerated procedure well    Post vital signs: stable    Level of consciousness: awake, alert and oriented    Nausea/Vomiting: no nausea/vomiting    Complications: none    Transfer of care protocol was followed      Last vitals:   Visit Vitals  BP (!) 159/56   Pulse 73   Temp 36.2 °C (97.2 °F) (Temporal)   Resp 15   Ht 5' (1.524 m)   Wt 59 kg (130 lb)   LMP  (LMP Unknown)   SpO2 99%   Breastfeeding? No   BMI 25.39 kg/m²

## 2017-08-28 NOTE — PLAN OF CARE
Plan of care discussed with pt and her daughter. Understanding verbalized. Pt states that the results can be discussed with her daughter

## 2017-08-28 NOTE — DISCHARGE INSTRUCTIONS
Colonoscopy     A camera attached to a flexible tube with a viewing lens is used to take video pictures.     Colonoscopy is a test to view the inside of your lower digestive tract (colon and rectum). Sometimes it can show the last part of the small intestine (ileum). During the test, small pieces of tissue may be removed for testing. This is called a biopsy. Small growths, such as polyps, may also be removed.   Why is colonoscopy done?  The test is done to help look for colon cancer. And it can help find the source of abdominal pain, bleeding, and changes in bowel habits. It may be needed once a year, depending on factors such as your:  · Age  · Health history  · Family health history  · Symptoms  · Results from any prior colonoscopy  Risks and possible complications  These include:  · Bleeding               · A puncture or tear in the colon   · Risks of anesthesia  · A cancer lesion not being seen  Getting ready   To prepare for the test:  · Talk with your healthcare provider about the risks of the test (see below). Also ask your healthcare provider about alternatives to the test.  · Tell your healthcare provider about any medicines you take. Also tell him or her about any health conditions you may have.  · Make sure your rectum and colon are empty for the test. Follow the diet and bowel prep instructions exactly. If you dont, the test may need to be rescheduled.  · Plan for a friend or family member to drive you home after the test.     Colonoscopy provides an inside view of the entire colon.     You may discuss the results with your doctor right away or at a future visit.  During the test   The test is usually done in the hospital on an outpatient basis. This means you go home the same day. The procedure takes about 30 minutes. During that time:  · You are given relaxing (sedating) medicine through an IV line. You may be drowsy, or fully asleep.  · The healthcare provider will first give you a physical exam to  check for anal and rectal problems.  · Then the anus is lubricated and the scope inserted.  · If you are awake, you may have a feeling similar to needing to have a bowel movement. You may also feel pressure as air is pumped into the colon. Its OK to pass gas during the procedure.  · Biopsy, polyp removal, or other treatments may be done during the test.  After the test   You may have gas right after the test. It can help to try to pass it to help prevent later bloating. Your healthcare provider may discuss the results with you right away. Or you may need to schedule a follow-up visit to talk about the results. After the test, you can go back to your normal eating and other activities. You may be tired from the sedation and need to rest for a few hours.  Date Last Reviewed: 11/1/2016  © 9712-2298 [employer]. [address] [place] [address], [place] [address]. All rights reserved. This information is not intended as a substitute for professional medical care. Always follow your healthcare professional's instructions.

## 2017-08-28 NOTE — ANESTHESIA POSTPROCEDURE EVALUATION
Anesthesia Post Evaluation    Patient: Jahaira GOLDEN Eunice    Procedure(s) Performed: Procedure(s) (LRB):  COLONOSCOPY (N/A)    Final Anesthesia Type: general  Patient location during evaluation: PACU  Patient participation: Yes- Able to Participate  Level of consciousness: awake and alert  Post-procedure vital signs: reviewed and stable  Pain management: adequate  Airway patency: patent  PONV status at discharge: No PONV  Anesthetic complications: no      Cardiovascular status: blood pressure returned to baseline and hemodynamically stable  Respiratory status: unassisted, spontaneous ventilation and room air  Hydration status: euvolemic  Follow-up not needed.        Visit Vitals  BP (!) 151/60   Pulse 65   Temp 36.2 °C (97.2 °F) (Temporal)   Resp 16   Ht 5' (1.524 m)   Wt 59 kg (130 lb)   LMP  (LMP Unknown)   SpO2 100%   Breastfeeding? No   BMI 25.39 kg/m²       Pain/Kesha Score: Pain Assessment Performed: Yes (8/28/2017 10:06 AM)  Presence of Pain: denies (8/28/2017 10:06 AM)  Kesha Score: 10 (8/28/2017 10:06 AM)

## 2017-08-28 NOTE — ANESTHESIA PREPROCEDURE EVALUATION
"                                                                                                             08/28/2017    Jahaira Garcia is a 88 y.o. female     Patient Active Problem List   Diagnosis    Pulmonary fibrosis    Uncontrolled type 2 diabetes mellitus with stage 4 chronic kidney disease, with long-term current use of insulin    Vitamin B 12 deficiency    Essential hypertension    Adrenal cortical steroids causing adverse effect in therapeutic use    HLD (hyperlipidemia)    Debility    Left bundle branch block    Type 2 diabetes mellitus with hyperglycemia    CKD (chronic kidney disease), stage IV    Bradycardia    Shock liver d/t cardiogenic shock from chb    CHB (complete heart block)    ILD (interstitial lung disease)    Cardiac pacemaker in situ    Secondary pulmonary hypertension    Left ventricular diastolic dysfunction    Chronic respiratory failure with hypoxia    Primary narcolepsy with cataplexy    Seizure    Senile purpura    Recurrent falls    Hypoproteinemia    Hypoalbuminemia due to protein-calorie malnutrition    Vitamin D deficiency    Atrial fibrillation    Nocturia    Oliguria    Vaginal atrophy    Chronic constipation    Female genuine stress incontinence    Internal hemorrhoid, bleeding       Allergies   Allergen Reactions    Tramadol      Confusion and sleeping and unsteady.       Current Outpatient Prescriptions on File Prior to Visit   Medication Sig Dispense Refill    acetaminophen (TYLENOL) 325 MG tablet Take 1-2 tablets (325-650 mg total) by mouth nightly as needed for Pain.      albuterol-ipratropium 2.5mg-0.5mg/3mL (DUO-NEB) 0.5 mg-3 mg(2.5 mg base)/3 mL nebulizer solution Take 3 mLs by nebulization every evening. And every 6 hours as needed.  0    artificial tears (ISOPTO TEARS) 0.5 % ophthalmic solution Place 1 drop into both eyes as needed.      BD ULTRA-FINE CHARAN PEN NEEDLES 32 gauge x 5/32" Ndle USE ONE 3 TIMES DAILY 300 each 0    " carvedilol (COREG) 3.125 MG tablet Take 1 tablet (3.125 mg total) by mouth 2 (two) times daily. 180 tablet 0    FOLIC ACID/MULTIVIT-MIN/LUTEIN (CENTRUM SILVER ORAL) Take 1 tablet by mouth once daily.      insulin lispro (HUMALOG KWIKPEN) 100 unit/mL InPn pen Patient injects 18 units with breakfast, 18 units with lunch, and 18 units dinner. 3 Box 3    levalbuterol (XOPENEX) 0.63 mg/3 mL nebulizer solution USE ONE VIAL TWICE DAILY 504 mL 0    losartan (COZAAR) 50 MG tablet Take 1 tablet (50 mg total) by mouth 2 (two) times daily with meals. 90 tablet 3    melatonin 1 mg Tab Take by mouth.      mupirocin (BACTROBAN) 2 % ointment Apply topically 3 (three) times daily. 30 g 0    ONETOUCH DELICA LANCETS 33 gauge Misc USE THREE TIMES DAILY 200 each 3    ONETOUCH VERIO Strp USE ONE 5 TIMES DAILY 450 each 12    polyethylene glycol (GLYCOLAX) 17 gram PwPk Take 17 g by mouth 3 (three) times daily as needed. 100 packet 0    pravastatin (PRAVACHOL) 20 MG tablet Take 1 tablet (20 mg total) by mouth once daily. 90 tablet 3    predniSONE (DELTASONE) 5 MG tablet       psyllium (METAMUCIL) packet Take 1 packet by mouth once daily.  12    rivaroxaban (XARELTO) 15 mg Tab Take 1 tablet (15 mg total) by mouth daily with dinner or evening meal. 30 tablet 11    UNABLE TO FIND Take by mouth once daily. medication name: protandim       No current facility-administered medications on file prior to visit.        Past Surgical History:   Procedure Laterality Date    APPENDECTOMY      CATARACT EXTRACTION EXTRACAPSULAR W/ INTRAOCULAR LENS IMPLANTATION      X 2    CHOLECYSTECTOMY      INSERT / REPLACE / REM[facility] PACEMAK[facility]/2016    [facility]    TONSILLECTOMY, ADENOIDECTOMY      TOTAL KNEE ARTHROPLASTY         Social History     Social History    Marital status:      Spouse name: N/A    Number of children: N/A    Years of education: N/A     Occupational History    retired      Social History Main Topics    Smoking  status: Never Smoker    Smokeless tobacco: Never Used    Alcohol use No    Drug use: No    Sexual activity: No     Other Topics Concern    Not on file     Social History Narrative    Lives alone         Vital Signs Range (Last 24H):  BP: ()/()   Arterial Line BP: ()/()       CBC:   No results for input(s): WBC, RBC, HGB, HCT, PLT, MCV, MCH, MCHC in the last 72 hours.    CMP:   No results for input(s): NA, K, CL, CO2, BUN, CREATININE, GLU, MG, PHOS, CALCIUM, ALBUMIN, PROT, ALKPHOS, ALT, AST, BILITOT in the last 72 hours.    INR  No results for input(s): INR, PROTIME, APTT in the last 72 hours.    Invalid input(s): PT  EKG:  Junctional bradycardia  Left axis deviation  RBBB, complete  Third degree (complete) AV block with junctional rhythm  Abnormal ECG  When compared with ECG of 27-OCT-2013 01:30,  Complete heart block Now present  Confirmed by ELODIA CARRERA MD ([id number]) on 6/23/2016 9:29:49 AM      2D Echo:  CONCLUSIONS 2/23/2016    1 - Normal left ventricular systolic function (EF 65-70%).     2 - Concentric remodeling.     3 - Left ventricular diastolic dysfunction.     4 - Normal right ventricular systolic function .     5 - Mild to moderate tricuspid regurgitation.     6 - Trivial pericardial effusion.     7 - Increased central venous pressure.     8 - Pulmonary hypertension. The estimated PA systolic pressure is 66 mmHg.       Pre-op Assessment    I have reviewed the Patient Summary Reports.     I have reviewed the Nursing Notes.   I have reviewed the Medications.     Review of Systems  Anesthesia Hx:  History of prior surgery of interest to airway management or planning: Denies Family Hx of Anesthesia complications.   Denies Personal Hx of Anesthesia complications.   Cardiovascular:   Exercise tolerance: poor Hypertension Denies MI.  Dysrhythmias  hyperlipidemia ECG has been reviewed.  Other Cardiac Conditions, Pulmonary Hypertension   Pulmonary:   Denies Pneumonia Pulmonary Fibrosis on chronic Prednisone  10mg PO daily and 2L O2 at home   Renal/:   Chronic Renal Disease, CRI, ARF    Hepatic/GI:   Hiatal Hernia, GERD Liver Disease,    Musculoskeletal:   Arthritis     Endocrine:   Diabetes, poorly controlled, using insulin        Physical Exam  General:  Well nourished    Airway/Jaw/Neck:  Airway Findings: Mouth Opening: Small, but > 3cm General Airway Assessment: Adult  Mallampati: III  TM Distance: 4 - 6 cm      Dental:  Dental Findings: In tact   Chest/Lungs:  Chest/Lungs Findings: Normal Respiratory Rate     Heart/Vascular:  Heart Findings: Rate: Bradycardia  Rhythm: Regular Rhythm  Sounds: Normal        Mental Status:  Mental Status Findings:  Cooperative, Somnolent         Anesthesia Plan  Type of Anesthesia, risks & benefits discussed:  Anesthesia Type:  general, MAC  Patient's Preference:   Intra-op Monitoring Plan:   Intra-op Monitoring Plan Comments:   Post Op Pain Control Plan:   Post Op Pain Control Plan Comments:   Induction:   IV  Beta Blocker:  Patient is not currently on a Beta-Blocker (No further documentation required).       Informed Consent: Patient representative understands risks and agrees with Anesthesia plan.  Questions answered. Anesthesia consent signed with patient representative.  ASA Score: 4     Day of Surgery Review of History & Physical:    H&P update referred to the provider.         Ready For Surgery From Anesthesia Perspective.

## 2017-08-28 NOTE — PLAN OF CARE
Problem: Patient Care Overview  Goal: Plan of Care Review  Outcome: Outcome(s) achieved Date Met: 08/28/17  Awake and alert. VSS. Denies pain or nausea. Tolerating liquids well. DC instructions given to patient and family and they verbalize understanding.

## 2017-08-28 NOTE — TELEPHONE ENCOUNTER
----- Message from Latasha Comer sent at 8/28/2017 12:16 PM CDT -----  Contact: Daughter of pt called  Daughter of pt, states pt was bleeding from rectum and has completed a colonoscopy. Pt's RX Xarelto was discontinued and she will like to discuss matter with Dr. Ann.Ph for pt is [phone]. Thank you

## 2017-08-28 NOTE — PATIENT INSTRUCTIONS
Discharge Summary/Instructions after an Endoscopic Procedure  Patient Name: Jahaira Garcia  Patient MRN: [id number]  Patient YOB: 1928 Monday, August 28, 2017  Michael Bear MD  RESTRICTIONS:  During your procedure today, you received medications for sedation.  These   medications may affect your judgment, balance and coordination.  Therefore,   for 24 hours, you have the following restrictions:   - DO NOT drive a car, operate machinery, make legal/financial decisions,   sign important papers or drink alcohol.    ACTIVITY:  The following day: return to full activity including work, except no heavy   lifting, straining or running for 3 days if polyps were removed.  DIET:  Eat and drink normally unless instructed otherwise.  TREATMENT FOR COMMON SIDE EFFECTS:  - Mild abdominal pain, belching, bloating or excessive gas: rest, eat   lightly and use a heating pad.  - Sore Throat: treat with throat lozenges and/or gargle with warm salt   water.  SYMPTOMS TO WATCH FOR AND REPORT TO YOUR PHYSICIAN:  1. Abdominal pain or bloating, other than gas cramps.  2. Chest pain.  3. Back pain.  4. Chills or fever occurring within 24 hours after the procedure.  5. Rectal bleeding, which would show as bright red, maroon, or black stools.   (A tablespoon of blood from the rectum is not serious, especially if   hemorrhoids are present.)  6. Vomiting.  7. Weakness or dizziness.  8. Because air was used during the procedure, expelling large amounts of air   from your rectum or belching is normal.  9. If a bowel prep was taken, you may not have a bowel movement for 1-3   days.  This is normal.  GO DIRECTLY TO THE EMERGENCY ROOM IF YOU HAVE ANY OF THE FOLLOWING:   Difficulty breathing  Chills and/or fever over 101 F   Persistent vomiting and/or vomiting blood   Severe abdominal pain   Severe chest pain   Black, tarry stools   Bleeding- more than one tablespoon  Your doctor recommends these additional instructions:  If any biopsies  were taken, your doctorâs clinic will call you in 1 to 2   weeks with any results.  You are being discharged to home.   You have a contact number available for emergencies.  The signs and symptoms   of potential delayed complications were discussed with you.  You may return   to normal activities tomorrow.  Written discharge instructions were   provided to you.   Resume your previous diet for the rest of your life.   Continue your present medications.   Resume taking Xarelto (rivaroxaban) at your prior dose in 2 days.  Follow up   with your managing physician for further adjustment of your therapy.   Your physician has recommended a repeat colonoscopy as needed for   surveillance.   Return to my office in two weeks.  For questions, problems or results please call your physician - Michael Bear MD at Work:  [phone].  [facility] EMERGENCY ROOM PHONE NUMBER: [phone]  IF A COMPLICATION OR EMERGENCY SITUATION ARISES AND YOU ARE UNABLE TO REACH   YOUR PHYSICIAN - GO DIRECTLY TO THE EMERGENCY ROOM.  Michael Bear MD  8/28/2017 8:27:05 AM  This report has been verified and signed electronically.

## 2017-08-28 NOTE — TELEPHONE ENCOUNTER
Returned call to daughter. Pt was admitted for bleeding from rectum.  A couple polyps were removed. She stated there are 2 growths but not going to be removed. The doctor said he didn't see anywhere she could be bleeding from. He would like her to stop the Xarelto if possible so the bleeding doesn't start again. She is calling Dr Ann to see what he advises. Advised her he would be back on Thursday and I would address with him.

## 2017-08-28 NOTE — TELEPHONE ENCOUNTER
Family called re insulin the night before cscope prep. BS currently 249. Spoke with surg resident requested for triage to call crs fellow. Spoke with dr garcia rec 1/2 long acting insulin the night before. Hold insulin in am. Family notified. Call back with questions.     Reason for Disposition   Question about upcoming scheduled test, no triage required and triager able to answer question    Protocols used: ST INFORMATION ONLY CALL-A-AH

## 2017-08-30 ENCOUNTER — TELEPHONE (OUTPATIENT)
Dept: ELECTROPHYSIOLOGY | Facility: CLINIC | Age: 82
End: 2017-08-30

## 2017-08-30 NOTE — PT/OT/SLP DISCHARGE
Occupational Therapy Discharge Summary    Jahaira Garcia  MRN: [id number]   Rectal bleeding   Patient Discharged from acute Occupational Therapy on 8/18/17.  Please refer to prior OT note dated on 8/18/17 for functional status.     Assessment:   Patient appropriate for care in another setting.  GOALS:    Occupational Therapy Goals        Problem: Occupational Therapy Goal    Goal Priority Disciplines Outcome Interventions   Occupational Therapy Goal     OT, PT/OT Ongoing (interventions implemented as appropriate)    Description:  Goals to be met by: 8/25/17    Patient will increase functional independence with ADLs by performing:    UE Dressing with Set-up Assistance.  LE Dressing with Stand-by Assistance and Assistive Devices as needed.  Grooming while standing at sink with Supervision.  Toileting from toilet with Supervision for hygiene and clothing management.   Supine to sit with [place].  Stand pivot transfers with Supervision.  Toilet transfer to toilet with Supervision.                    Reasons for Discontinuation of Therapy Services  Transfer to alternate level of care.      Plan:  Patient Discharged to: Home with Home Health Service.

## 2017-08-30 NOTE — TELEPHONE ENCOUNTER
Daughter called back today. She would like for me to speak with the NP, Joanne who put her Mom on the Xarelto. Advised I would forward the information to her. Pt's daughter voiced understanding.

## 2017-08-30 NOTE — TELEPHONE ENCOUNTER
----- Message from Latasha Comer sent at 8/30/2017 12:12 PM CDT -----  Contact: Daughter of pt called  Daughter of pt called, states she is unsure if she missed your call and will like to be reached at Ph [phone]. Thank you

## 2017-09-01 ENCOUNTER — TELEPHONE (OUTPATIENT)
Dept: ELECTROPHYSIOLOGY | Facility: CLINIC | Age: 82
End: 2017-09-01

## 2017-09-01 ENCOUNTER — TELEPHONE (OUTPATIENT)
Dept: INTERNAL MEDICINE | Facility: CLINIC | Age: 82
End: 2017-09-01

## 2017-09-01 NOTE — TELEPHONE ENCOUNTER
----- Message from Lulu Gaming sent at 9/1/2017 11:40 AM CDT -----  Contact: Rowena/Daughter/[phone]  Would like to know if Dr recommends mother getting a flu shot? If so, can pt get the flu shot when she comes in for her apt on apt 9/11? Pt had a colonoscopy on Monday and they removed polyps and she had rectal bleeding but it stopped.

## 2017-09-05 RX ORDER — CARVEDILOL 3.12 MG/1
3.12 TABLET ORAL 2 TIMES DAILY
Qty: 180 TABLET | Refills: 0 | Status: SHIPPED | OUTPATIENT
Start: 2017-09-05 | End: 2017-12-04 | Stop reason: SDUPTHER

## 2017-09-10 NOTE — PROGRESS NOTES
"CC: Hosp follow up          88 y.o. female patient presents in f/u to hospitalization       Admitted: 8/16/2017       D/C: 8/18/2017    Family and/or Caretaker present at visit?  Dtr, Nasreen +  Diagnostic tests reviewed/disposition: .C-scope, lab H/H 8.1/26.2, CKD III, hypoCa++, hyperglycemia ( DM- A1C 8.0)  Disease/illness education: GI bleed, + polypoid lesions- colon, afibon anticoag( since d/c'd)  Home health/community services discussion/referrals: . HH w/ PT , OT, and Speech   Establishment or re-establishment of referral orders for community resources: n/a  Discussion with other health care providers:  n/a    88 year old lady w/ DM II, pulmonary fibrosis, ILD, HTN, CKD III, A fib s/p PPM.     Presented for diarrhea for the  2-3 days about three times a day.    +Chronic constipation and recently switched from Miralax to fiber.    She felt like she needed to have a BM and instead had a large blood clot (approximately 3-4" in diameter) and BRB on the toilet paper and in the toilet, and  continued to bleed afterwards.    Denied abdominal and rectal pain, melena, hematochezia, hematemesis, chest pain, worsened SOB, dizziness, palpitations, fever/chills, N/V.   Blood consent signed and type & screen ordered.  CRS consulted and on exam, no active bleeding although some clotted blood on anoscopy at her internal hemorrhoids. CRS placed a piece of quick-clot via the anoscope. It was thought that there may be irritation of her hemorrhoids secondary to diarrhea. Fiber supplementation and staying well hydrated, sitz baths as needed was discussed with the patient. Pt's history of multiple polyps on her colonoscopies in 2002 and 2003 was also discussed. Pt states that, even if we found colon or rectal cancer, that she would adamantly refuse treatment.            Disposition:        Meds: started polyethylene glycol, and metamucil  Xarelto was continued                Since discharge:       Sx: C-scope  - Four 6 to 15 mm " polyps in the transverse colon,                         in the proximal transverse colon and in the distal                         transverse colon, removed with a hot snare.                         Resected and retrieved.                        - Blood in the cecum. No specimens collected.                        - Tumor at the hepatic flexure and in the ascending                         colon. Pt declined further tx  Reviewed w/ Cards and Angelikarafael was d/c'd              Medcard:       ROS:         No fever, chills, or night sweats         No visual changes or d/c         No dysphagia or early satiety;       Appetite fair         No cough or wheezing         No PND or orthopnea         No chest pain or palpitations         No weight change or LE edema         No GERD or abdominal pain         No change in bowel or blood in stool         No change in urination or dysuria or hematuria         No heat or cold intolerance         No rash or skin lesions or breakdown         No unusual bruising or bleeding         No depression or anxiety         No memory changes or confusion         No HA or focal deficits                  Remainder of review negative except as previously noted    PMHX: Reviewed  PSHX: Reviewed  FHX:    Reviewed  SHX:    Reviewed    PE:  VSS  GEN: frail, elderly female,, A&O, NAD, conversant and co-operative, sitting in w/c w/ O2 in place  EYES: Conj/lids unremarkable, sclera anicteric  ENT:   O/P unremarkable ; mucus membranes dry                 No stridor,  Sinus NT  NECK: Supple w/o lymphadenopathy or thyromegaly  RESP: Efforts unlabored, lungs CTAP  CV:: Heart RRR w/o mgr, No carotid bruits noted, dim pedal pulses , no edema  GI: Abdomen BS+, soft, NT/ND, - HSM noted  MSK: Gait impaired, wheelchair; No CCE noted  NEURO: CHAO. No facial asymmetry or confusion noted  SKIN: warm and dry; several SK< some irritated    IMPRESSION:  Colon tumor, pt declined surgery  GI bleed, none noted since d/c  home  Anemia, not taking iron supplement  Afib, asx  LV DD, asx  Pulmonary fibrosis w/ resp failure- on O2, exacerbated w/ hosp  DM w/ CKD III, exacerbated  Vit D deficiency    PLAN:  Lab- CBC and iron studies and CMP  Iron supplement  Stool softener  Consult Derm  Post Hospital D/C Medications have been reconciled

## 2017-09-11 ENCOUNTER — OFFICE VISIT (OUTPATIENT)
Dept: INTERNAL MEDICINE | Facility: CLINIC | Age: 82
End: 2017-09-11
Payer: MEDICARE

## 2017-09-11 VITALS
WEIGHT: 135.81 LBS | BODY MASS INDEX: 26.66 KG/M2 | TEMPERATURE: 98 F | HEIGHT: 60 IN | RESPIRATION RATE: 16 BRPM | SYSTOLIC BLOOD PRESSURE: 122 MMHG | HEART RATE: 72 BPM | DIASTOLIC BLOOD PRESSURE: 60 MMHG | OXYGEN SATURATION: 91 %

## 2017-09-11 DIAGNOSIS — J96.11 CHRONIC RESPIRATORY FAILURE WITH HYPOXIA: Chronic | ICD-10-CM

## 2017-09-11 DIAGNOSIS — D50.0 IRON DEFICIENCY ANEMIA DUE TO CHRONIC BLOOD LOSS: ICD-10-CM

## 2017-09-11 DIAGNOSIS — L82.0 SEBORRHEIC KERATOSES, INFLAMED: ICD-10-CM

## 2017-09-11 DIAGNOSIS — E55.9 VITAMIN D DEFICIENCY: ICD-10-CM

## 2017-09-11 DIAGNOSIS — D49.0 COLON TUMOR: Primary | ICD-10-CM

## 2017-09-11 DIAGNOSIS — K92.2 LOWER GI BLEED: ICD-10-CM

## 2017-09-11 DIAGNOSIS — I51.9 LEFT VENTRICULAR DIASTOLIC DYSFUNCTION: ICD-10-CM

## 2017-09-11 DIAGNOSIS — J84.10 PULMONARY FIBROSIS: Chronic | ICD-10-CM

## 2017-09-11 DIAGNOSIS — I48.91 ATRIAL FIBRILLATION, UNSPECIFIED TYPE: Chronic | ICD-10-CM

## 2017-09-11 PROCEDURE — 99213 OFFICE O/P EST LOW 20 MIN: CPT | Mod: PBBFAC,PO | Performed by: INTERNAL MEDICINE

## 2017-09-11 PROCEDURE — 99495 TRANSJ CARE MGMT MOD F2F 14D: CPT | Mod: PBBFAC,PO | Performed by: INTERNAL MEDICINE

## 2017-09-11 PROCEDURE — 99214 OFFICE O/P EST MOD 30 MIN: CPT | Mod: S$PBB,,, | Performed by: INTERNAL MEDICINE

## 2017-09-11 PROCEDURE — 99999 PR PBB SHADOW E&M-EST. PATIENT-LVL III: CPT | Mod: PBBFAC,,, | Performed by: INTERNAL MEDICINE

## 2017-09-12 ENCOUNTER — TELEPHONE (OUTPATIENT)
Dept: INTERNAL MEDICINE | Facility: CLINIC | Age: 82
End: 2017-09-12

## 2017-09-12 NOTE — TELEPHONE ENCOUNTER
Noted.  Pt's daughter was inquiring about a calcium supplement at the pt's office visit.  Please advise.

## 2017-09-12 NOTE — TELEPHONE ENCOUNTER
Calcium was drawn yesterday and is now in the normal range  rec calcium rich foods- yogurt   If she is taking a MVI daily, it also will have Calcium

## 2017-09-12 NOTE — TELEPHONE ENCOUNTER
----- Message from Joceline Resendiz MD sent at 9/11/2017  9:27 PM CDT -----  Please note that your anemia has improved, please take one iron tablet daily.  Your kidney function is stable, please keep well hydrated  And your vitamin D has improved  Please do not hesitate to call/email if you have any questions or concerns  Joceline Estrada

## 2017-09-13 ENCOUNTER — TELEPHONE (OUTPATIENT)
Dept: INTERNAL MEDICINE | Facility: CLINIC | Age: 82
End: 2017-09-13

## 2017-09-13 NOTE — TELEPHONE ENCOUNTER
----- Message from Yoon Catrina sent at 9/13/2017 12:28 PM CDT -----  Contact: pt [phone]  Patient would like to get test results.  Name of test (lab, mammo, etc.):  labs  Date of test:  9-  Ordering provider: Natalie Ayers  Where was the test performed:  met  Comments:

## 2017-09-18 NOTE — TELEPHONE ENCOUNTER
Left message for patient to call us back.  Phone number was provided.    Need to know:  Are these fasting reading?  Is she taking humalog with lunch and dinner as well?

## 2017-09-18 NOTE — TELEPHONE ENCOUNTER
----- Message from Codi Yarbrough sent at 9/15/2017 11:44 AM CDT -----  Contact: Ana Rosa / [facility]  [phone]  Ana Rosa called to provide some of the pt's bg numbers:    8/25  173  8/30  176  9/6    176  9/8    212  9/13  212  9/15  176    Before breakfast / 20 units Humalog     pls contact pt's daughter at [phone] with any instructions.

## 2017-09-25 DIAGNOSIS — J84.9 ILD (INTERSTITIAL LUNG DISEASE): Primary | ICD-10-CM

## 2017-09-25 RX ORDER — INSULIN LISPRO 100 [IU]/ML
22 INJECTION, SOLUTION INTRAVENOUS; SUBCUTANEOUS 3 TIMES DAILY
Qty: 3 BOX | Refills: 3 | Status: SHIPPED | OUTPATIENT
Start: 2017-09-25

## 2017-09-25 NOTE — TELEPHONE ENCOUNTER
Spoke with Chula. Gave her Dr. Agrawal' changes. From 20 units tid to 22 units tid. She will let us know how Her mom does with these changes.

## 2017-09-26 ENCOUNTER — HOSPITAL ENCOUNTER (OUTPATIENT)
Dept: PULMONOLOGY | Facility: CLINIC | Age: 82
Discharge: HOME | End: 2017-09-26
Payer: MEDICARE

## 2017-09-26 ENCOUNTER — OFFICE VISIT (OUTPATIENT)
Dept: PULMONOLOGY | Facility: CLINIC | Age: 82
End: 2017-09-26
Payer: MEDICARE

## 2017-09-26 VITALS
DIASTOLIC BLOOD PRESSURE: 74 MMHG | WEIGHT: 135 LBS | SYSTOLIC BLOOD PRESSURE: 134 MMHG | OXYGEN SATURATION: 87 % | BODY MASS INDEX: 29.12 KG/M2 | HEART RATE: 63 BPM | HEIGHT: 57 IN

## 2017-09-26 DIAGNOSIS — J84.9 ILD (INTERSTITIAL LUNG DISEASE): ICD-10-CM

## 2017-09-26 DIAGNOSIS — J96.11 CHRONIC RESPIRATORY FAILURE WITH HYPOXIA: Primary | ICD-10-CM

## 2017-09-26 PROCEDURE — 99214 OFFICE O/P EST MOD 30 MIN: CPT | Mod: 25,S$PBB,ICN, | Performed by: INTERNAL MEDICINE

## 2017-09-26 PROCEDURE — 94727 GAS DIL/WSHOT DETER LNG VOL: CPT | Mod: PBBFAC | Performed by: INTERNAL MEDICINE

## 2017-09-26 PROCEDURE — 99213 OFFICE O/P EST LOW 20 MIN: CPT | Mod: PBBFAC,25 | Performed by: INTERNAL MEDICINE

## 2017-09-26 PROCEDURE — 94729 DIFFUSING CAPACITY: CPT | Mod: 26,S$PBB,, | Performed by: INTERNAL MEDICINE

## 2017-09-26 PROCEDURE — 99999 PR PBB SHADOW E&M-EST. PATIENT-LVL III: CPT | Mod: PBBFAC,,, | Performed by: INTERNAL MEDICINE

## 2017-09-26 PROCEDURE — 94729 DIFFUSING CAPACITY: CPT | Mod: PBBFAC | Performed by: INTERNAL MEDICINE

## 2017-09-26 PROCEDURE — 1157F ADVNC CARE PLAN IN RCRD: CPT | Mod: ,,, | Performed by: INTERNAL MEDICINE

## 2017-09-26 PROCEDURE — 94727 GAS DIL/WSHOT DETER LNG VOL: CPT | Mod: 26,S$PBB,, | Performed by: INTERNAL MEDICINE

## 2017-09-26 PROCEDURE — 1126F AMNT PAIN NOTED NONE PRSNT: CPT | Mod: ,,, | Performed by: INTERNAL MEDICINE

## 2017-09-26 PROCEDURE — 1159F MED LIST DOCD IN RCRD: CPT | Mod: ,,, | Performed by: INTERNAL MEDICINE

## 2017-09-27 NOTE — PROGRESS NOTES
"Subjective:       Patient ID: Jahaira Garcia is a 88 y.o. female.    Chief Complaint: Interstitial Lung Disease and Shortness of Breath    HPI  Delightful 89 yo female soon to be 89 with chronic respiratory failure secondary to pulmonary fibrosis comes accompanied by her daughter.  He lung situation is stable and is slightly better as a per cent from last visit. FVC: 0.97 liters 54 % and DLCO is 59% She is on prednisone 5 mg BIDand these values are 13 % better than January. Sa02: 9% of 2 liters      No flowsheet data found.]  Review of Systems   Constitutional: Negative.    HENT: Negative.    Eyes: Negative.    Respiratory: Negative.  Positive for dyspnea on extertion.         End stage ILD on oxygen   Cardiovascular: Negative.         Chronic atrial fibrillation with in situ pacemaker   Genitourinary: Negative.    Endocrine: Type II diabetes   Musculoskeletal: Negative.    Skin: Negative.    Gastrointestinal: Negative.    Neurological: Negative.    Psychiatric/Behavioral: Negative.        Objective:      Physical Exam   Constitutional:   Alert and oriented and cracking jokes.    Pulmonary/Chest:   Clear           Assessment:       No diagnosis found.    Outpatient Encounter Prescriptions as of 9/26/2017   Medication Sig Dispense Refill    acetaminophen (TYLENOL) 325 MG tablet Take 1-2 tablets (325-650 mg total) by mouth nightly as needed for Pain.      albuterol-ipratropium 2.5mg-0.5mg/3mL (DUO-NEB) 0.5 mg-3 mg(2.5 mg base)/3 mL nebulizer solution Take 3 mLs by nebulization every evening. And every 6 hours as needed.  0    artificial tears (ISOPTO TEARS) 0.5 % ophthalmic solution Place 1 drop into both eyes as needed.      BD ULTRA-FINE CHARAN PEN NEEDLES 32 gauge x 5/32" Ndle USE ONE 3 TIMES DAILY 300 each 0    carvedilol (COREG) 3.125 MG tablet Take 1 tablet (3.125 mg total) by mouth 2 (two) times daily. 180 tablet 0    FOLIC ACID/MULTIVIT-MIN/LUTEIN (CENTRUM SILVER ORAL) Take 1 tablet by mouth once daily.   "    insulin lispro (HUMALOG KWIKPEN) 100 unit/mL InPn pen Inject 22 Units into the skin 3 (three) times daily. 3 Box 3    levalbuterol (XOPENEX) 0.63 mg/3 mL nebulizer solution USE ONE VIAL TWICE DAILY 504 mL 0    losartan (COZAAR) 50 MG tablet TAKE ONE DAILY 90 tablet 3    melatonin 1 mg Tab Take by mouth.      methyl salicylate-menthol (SALONPAS) 10-3 % SprA Apply topically.      mupirocin (BACTROBAN) 2 % ointment Apply topically 3 (three) times daily. 30 g 0    ONETOUCH DELICA LANCETS 33 gauge Misc USE THREE TIMES DAILY 200 each 3    ONETOUCH VERIO Strp USE ONE 5 TIMES DAILY 450 each 12    polyethylene glycol (GLYCOLAX) 17 gram PwPk Take 17 g by mouth 3 (three) times daily as needed. 100 packet 0    pravastatin (PRAVACHOL) 20 MG tablet Take 1 tablet (20 mg total) by mouth once daily. 90 tablet 3    predniSONE (DELTASONE) 5 MG tablet       psyllium (METAMUCIL) packet Take 1 packet by mouth once daily.  12    rivaroxaban (XARELTO) 15 mg Tab Take 1 tablet (15 mg total) by mouth daily with dinner or evening meal. 30 tablet 11    UNABLE TO FIND Take by mouth once daily. medication name: protandim      [DISCONTINUED] carvedilol (COREG) 3.125 MG tablet Take 1 tablet (3.125 mg total) by mouth 2 (two) times daily. 180 tablet 0    [DISCONTINUED] insulin lispro (HUMALOG KWIKPEN) 100 unit/mL InPn pen Patient injects 18 units with breakfast, 18 units with lunch, and 18 units dinner. 3 Box 3    [DISCONTINUED] losartan (COZAAR) 50 MG tablet Take 1 tablet (50 mg total) by mouth 2 (two) times daily with meals. 90 tablet 3     No facility-administered encounter medications on file as of 9/26/2017.      No orders of the defined types were placed in this encounter.    Plan:          Stable respiratory failure secondary to ILD

## 2017-10-08 RX ORDER — PRAVASTATIN SODIUM 20 MG/1
TABLET ORAL
Qty: 90 TABLET | Refills: 0 | Status: SHIPPED | OUTPATIENT
Start: 2017-10-08 | End: 2018-01-15 | Stop reason: SDUPTHER

## 2017-10-13 DIAGNOSIS — J84.9 ILD (INTERSTITIAL LUNG DISEASE): Primary | ICD-10-CM

## 2017-10-18 ENCOUNTER — TELEPHONE (OUTPATIENT)
Dept: INTERNAL MEDICINE | Facility: CLINIC | Age: 82
End: 2017-10-18

## 2017-10-18 NOTE — TELEPHONE ENCOUNTER
----- Message from Kimi De Dios sent at 10/18/2017 10:35 AM CDT -----  Contact: Chula [phone] daughter  Would like to know if she needs paperwork for a flu shot. Patient is having black stool and would like to know if it is caused by iron supplement and how long should she be taking the iron supplement?

## 2017-10-18 NOTE — TELEPHONE ENCOUNTER
Spoke to pt's daughter, Chula. She stated that the pt has been having black stools for a week.   I clarified that the pt mentioned that it was black and not dark green from the iron supplement.  Daughter stated that the pt said it was black.  Dr. Estrada informed. Per Dr. Estrada, pt has a colon tumor. Advised that pt go to the ER.  I advised Chula to take the pt to the ER.

## 2017-10-25 ENCOUNTER — TELEPHONE (OUTPATIENT)
Dept: ELECTROPHYSIOLOGY | Facility: CLINIC | Age: 82
End: 2017-10-25

## 2017-10-25 DIAGNOSIS — Z95.0 CARDIAC PACEMAKER: ICD-10-CM

## 2017-10-25 DIAGNOSIS — I48.0 PAF (PAROXYSMAL ATRIAL FIBRILLATION): Primary | ICD-10-CM

## 2017-10-25 NOTE — TELEPHONE ENCOUNTER
I returned daughter call (Mobil Number)as listed in chart.  Patient answered call, and patient stated she didn't know what I was talking about (Rescheding Appt ). A-wait for Daughter call.

## 2017-10-25 NOTE — TELEPHONE ENCOUNTER
Ms Garcia daughter is calling to reschedule all of  the 11/3/17 w/ Dr Ann. Meredith PENDLETON to call and reschedule      -called all #'s for Ms Garcia to speak w/ Saumya. Left ms to call back re: appt's she wants to reschedule.

## 2017-10-25 NOTE — TELEPHONE ENCOUNTER
Chula the daughter called back and rescheduled all appointments for her mom (patient), they needed a later date and Time. All appointment have mailed to them as well. 12-11-17.

## 2017-11-12 ENCOUNTER — HOSPITAL ENCOUNTER (EMERGENCY)
Facility: HOSPITAL | Age: 82
Discharge: HOME | End: 2017-11-12
Attending: FAMILY MEDICINE
Payer: MEDICARE

## 2017-11-12 VITALS
BODY MASS INDEX: 27.48 KG/M2 | OXYGEN SATURATION: 98 % | TEMPERATURE: 97 F | WEIGHT: 140 LBS | SYSTOLIC BLOOD PRESSURE: 180 MMHG | HEIGHT: 60 IN | DIASTOLIC BLOOD PRESSURE: 90 MMHG | HEART RATE: 70 BPM | RESPIRATION RATE: 18 BRPM
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
## 2017-11-12 DIAGNOSIS — T14.8XXA SUBCUTANEOUS HEMATOMA: Primary | ICD-10-CM

## 2017-11-12 PROCEDURE — 99282 EMERGENCY DEPT VISIT SF MDM: CPT

## 2017-11-12 PROCEDURE — 99283 EMERGENCY DEPT VISIT LOW MDM: CPT | Mod: ,,, | Performed by: FAMILY MEDICINE

## 2017-11-13 NOTE — ED NOTES
Patient identifiers verified and correct for Jahaira Garcia.    LOC: The patient is awake, alert and aware of environment with an appropriate affect, the patient is oriented x 3 and speaking appropriately.  APPEARANCE: Patient resting comfortably and in no acute distress, patient is clean and well groomed, patient's clothing is properly fastened.  SKIN: The skin is warm and dry, color consistent with ethnicity, patient has normal skin turgor and moist mucus membranes, skin with healing sore to right shin area.   MUSCULOSKELETAL: Patient moving all extremities spontaneously, no obvious swelling or deformities noted. Pt arrives to room in wheelchair and is usu ambulatory with walker.   RESPIRATORY: Airway is open and patent, respirations are spontaneous, patient has a normal effort and rate, no accessory muscle use noted, bilateral breath sounds diminished in bases - pt is currently on oxygen bnc 2L all the time.   CARDIAC: Patient has a normal rate and regular rhythm, +3 pitting periphreal edema noted to tristan lower extr , capillary refill  >3 seconds to right toes. Discoloration noted to skin. Cool to touch on both feet. Large area of discoloration and bruising noted to left lateral calf with large blood blister. No warmth or induration noted. Palpable pulse noted to left DP - dopplar pulse noted to right DP   ABDOMEN: Soft and non tender to palpation, no distention noted, normoactive bowel sounds present in all four quadrants.  NEUROLOGIC: PERRL, 3 mm bilaterally, eyes open spontaneously, behavior appropriate to situation, follows commands, facial expression symmetrical, bilateral hand grasp equal and even, purposeful motor response noted, normal sensation in all extremities when touched with a finger.

## 2017-11-13 NOTE — DISCHARGE INSTRUCTIONS
Keep legs elevated when possible.    Keep hematoma area clean and avoid trauma if possible.    Return to ED if you develop fever, increasing leg pain and swelling, or other worrisome symptoms.
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
## 2017-11-14 ENCOUNTER — TELEPHONE (OUTPATIENT)
Dept: UROGYNECOLOGY | Facility: CLINIC | Age: 82
End: 2017-11-14

## 2017-11-21 ENCOUNTER — OFFICE VISIT (OUTPATIENT)
Dept: INTERNAL MEDICINE | Facility: CLINIC | Age: 82
End: 2017-11-21
Payer: MEDICARE

## 2017-11-21 VITALS
WEIGHT: 143.06 LBS | SYSTOLIC BLOOD PRESSURE: 163 MMHG | DIASTOLIC BLOOD PRESSURE: 75 MMHG | HEIGHT: 60 IN | HEART RATE: 66 BPM | BODY MASS INDEX: 28.09 KG/M2 | RESPIRATION RATE: 14 BRPM | TEMPERATURE: 98 F

## 2017-11-21 DIAGNOSIS — J96.11 CHRONIC RESPIRATORY FAILURE WITH HYPOXIA: Chronic | ICD-10-CM

## 2017-11-21 DIAGNOSIS — S80.12XD LEG HEMATOMA, LEFT, SUBSEQUENT ENCOUNTER: ICD-10-CM

## 2017-11-21 DIAGNOSIS — S81.802D WOUND OF LEFT LOWER EXTREMITY, SUBSEQUENT ENCOUNTER: Primary | ICD-10-CM

## 2017-11-21 PROCEDURE — 99214 OFFICE O/P EST MOD 30 MIN: CPT | Mod: PBBFAC,PO | Performed by: INTERNAL MEDICINE

## 2017-11-21 PROCEDURE — 99999 PR PBB SHADOW E&M-EST. PATIENT-LVL IV: CPT | Mod: PBBFAC,,, | Performed by: INTERNAL MEDICINE

## 2017-11-21 PROCEDURE — 99213 OFFICE O/P EST LOW 20 MIN: CPT | Mod: S$PBB,,, | Performed by: INTERNAL MEDICINE
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
## 2017-11-24 ENCOUNTER — TELEPHONE (OUTPATIENT)
Dept: INTERNAL MEDICINE | Facility: CLINIC | Age: 82
End: 2017-11-24

## 2017-11-24 NOTE — TELEPHONE ENCOUNTER
Spoke to shena with [facility], stated patient has closed, intact blood blister. Patient was instructed to keep clean dry and covered until it resolves.  Stated evaluated wound and could not justify admission for wound care. Patient will not be admitted to home health services. Message sent to Dr Estrada to make her aware.

## 2017-11-24 NOTE — TELEPHONE ENCOUNTER
----- Message from Yoon Fritz sent at 11/24/2017  2:21 PM CST -----  Contact: [facility]- Rosenda @[phone]  Rosenda would like a call from the nurse in regards to the pt not needing home health care services  for the wound she has,please advise Rosenda

## 2017-11-29 ENCOUNTER — TELEPHONE (OUTPATIENT)
Dept: INTERNAL MEDICINE | Facility: CLINIC | Age: 82
End: 2017-11-29

## 2017-11-29 DIAGNOSIS — S81.802D WOUND OF LEFT LOWER EXTREMITY, SUBSEQUENT ENCOUNTER: Primary | ICD-10-CM

## 2017-11-29 NOTE — TELEPHONE ENCOUNTER
----- Message from Janina Becerril sent at 11/29/2017 10:08 AM CST -----  Contact: Pt daughter Rowena [phone]  Rowena would like a call back from the nurse regarding a new open wound on the patients leg.

## 2017-11-29 NOTE — TELEPHONE ENCOUNTER
Spoke to pt's daughter, loida. She stated that before Thanksgiving, the nurse came out to admit the pt to . She stated that the nurse told her that nothing was needed to be done for the wound to the pt's left lower leg. Loida stated that the pt's leg is still draining and it is an open sore. She also stated that the pt has a blister to the right lower leg now. Loida stated that she called [facility] to inquire when the next visit was. She stated that she spoke to Lucille, and was told that the pt was being d/c from  because there was no need for nursing care. She stated that Lucille also told her that Dr. Estrada's office was notified.  Loida was upset to hear that the pt was already d/c.  Please advise.

## 2017-11-29 NOTE — TELEPHONE ENCOUNTER
Spoke to Rowena. I informed her of recommendation. She was ok with proceeding with the referral.  Referral sent to Zabrina for scheduling.

## 2017-11-30 ENCOUNTER — TELEPHONE (OUTPATIENT)
Dept: INTERNAL MEDICINE | Facility: CLINIC | Age: 82
End: 2017-11-30

## 2017-11-30 NOTE — TELEPHONE ENCOUNTER
Spoke to referral coordinator, Zabrina. Referral information was faxed to wound care this morning. She stated that Wound Care contacts pt to schedule themselves.  Spoke to pt's daughter, Rowena. I informed her referral status.

## 2017-11-30 NOTE — TELEPHONE ENCOUNTER
----- Message from Elsy Egan sent at 11/30/2017  3:32 PM CST -----  Contact: Daughter,  Rowena call  [phone]  Melissa please call her about no one from wound care called to set up her mothers appointment.  Can you get her in tomorrow with wound care?

## 2017-12-04 ENCOUNTER — TELEPHONE (OUTPATIENT)
Dept: INTERNAL MEDICINE | Facility: CLINIC | Age: 82
End: 2017-12-04

## 2017-12-04 RX ORDER — CARVEDILOL 3.12 MG/1
3.12 TABLET ORAL 2 TIMES DAILY
Qty: 180 TABLET | Refills: 2 | Status: SHIPPED | OUTPATIENT
Start: 2017-12-04 | End: 2018-03-20 | Stop reason: SDUPTHER

## 2017-12-04 RX ORDER — CARVEDILOL 3.12 MG/1
TABLET ORAL
Refills: 0 | Status: CANCELLED | OUTPATIENT
Start: 2017-12-04

## 2017-12-04 NOTE — TELEPHONE ENCOUNTER
"----- Message from Lulu Gaming sent at 12/4/2017 10:55 AM CST -----  Contact: Self  RX request - refill or new RX.  Is this a refill or new RX:  refill  RX name and strength:  carvedilol (COREG) 3.125 MG tablet   Directions: Take 1 tablet (3.125 mg total) by mouth 2 (two) times daily. - Oral  Is this a 30 day or 90 day RX:  [phone] (Phone)  [phone] (Fax)  Pharmacy name and phone # (DON'T enter "on file" or "in chart"): [facility] -   Comments:        "

## 2017-12-07 ENCOUNTER — TELEPHONE (OUTPATIENT)
Dept: ENDOCRINOLOGY | Facility: CLINIC | Age: 82
End: 2017-12-07

## 2017-12-07 ENCOUNTER — HOSPITAL ENCOUNTER (OUTPATIENT)
Dept: WOUND CARE | Facility: HOSPITAL | Age: 82
Discharge: HOME | End: 2017-12-07
Attending: FAMILY MEDICINE
Payer: MEDICARE

## 2017-12-07 VITALS
DIASTOLIC BLOOD PRESSURE: 74 MMHG | TEMPERATURE: 97 F | HEIGHT: 60 IN | SYSTOLIC BLOOD PRESSURE: 178 MMHG | HEART RATE: 70 BPM | WEIGHT: 143 LBS | BODY MASS INDEX: 28.07 KG/M2

## 2017-12-07 DIAGNOSIS — S81.802D WOUND OF LEFT LOWER EXTREMITY, SUBSEQUENT ENCOUNTER: ICD-10-CM

## 2017-12-07 PROCEDURE — 99204 OFFICE O/P NEW MOD 45 MIN: CPT

## 2017-12-07 PROCEDURE — 99214 OFFICE O/P EST MOD 30 MIN: CPT

## 2017-12-07 RX ORDER — SILVER SULFADIAZINE 10 G/1000G
CREAM TOPICAL DAILY
Qty: 50 G | Refills: 0 | Status: SHIPPED | OUTPATIENT
Start: 2017-12-07

## 2017-12-07 NOTE — PROGRESS NOTES
Much of the documentation for this visit was completed in the [facility] system. Please see the attached documentation for further details about this patient's care.     Melissa Mills RN
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
## 2017-12-07 NOTE — TELEPHONE ENCOUNTER
----- Message from Davina Saamntha sent at 12/7/2017  1:04 PM CST -----  Contact: caregiver: Rowena  tel:   [phone]  Caller is asking for the nurse to call her about getting diabetic nerve pain.    Wants to discuss getting shoes.   Jacqueline needs a perscription from the .   What is the procedure to follow in order to get these.   Do they need a f/u appt. W/ the ?  Pls call.
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
## 2017-12-11 ENCOUNTER — OFFICE VISIT (OUTPATIENT)
Dept: ELECTROPHYSIOLOGY | Facility: CLINIC | Age: 82
End: 2017-12-11
Payer: MEDICARE

## 2017-12-11 ENCOUNTER — HOSPITAL ENCOUNTER (OUTPATIENT)
Dept: CARDIOLOGY | Facility: CLINIC | Age: 82
Discharge: HOME | End: 2017-12-11
Payer: MEDICARE

## 2017-12-11 ENCOUNTER — CLINICAL SUPPORT (OUTPATIENT)
Dept: ELECTROPHYSIOLOGY | Facility: CLINIC | Age: 82
End: 2017-12-11
Payer: MEDICARE

## 2017-12-11 VITALS
HEART RATE: 64 BPM | HEIGHT: 60 IN | DIASTOLIC BLOOD PRESSURE: 59 MMHG | SYSTOLIC BLOOD PRESSURE: 151 MMHG | WEIGHT: 139 LBS | BODY MASS INDEX: 27.29 KG/M2

## 2017-12-11 DIAGNOSIS — N18.4 CKD (CHRONIC KIDNEY DISEASE), STAGE IV: Chronic | ICD-10-CM

## 2017-12-11 DIAGNOSIS — I10 ESSENTIAL HYPERTENSION: Chronic | ICD-10-CM

## 2017-12-11 DIAGNOSIS — I27.20 PULMONARY HYPERTENSION: ICD-10-CM

## 2017-12-11 DIAGNOSIS — I48.0 PAROXYSMAL ATRIAL FIBRILLATION: Primary | ICD-10-CM

## 2017-12-11 DIAGNOSIS — I48.0 PAF (PAROXYSMAL ATRIAL FIBRILLATION): ICD-10-CM

## 2017-12-11 DIAGNOSIS — Z95.0 CARDIAC PACEMAKER: ICD-10-CM

## 2017-12-11 DIAGNOSIS — I44.7 LEFT BUNDLE BRANCH BLOCK: ICD-10-CM

## 2017-12-11 DIAGNOSIS — R00.1 BRADYCARDIA: ICD-10-CM

## 2017-12-11 DIAGNOSIS — I44.2 CHB (COMPLETE HEART BLOCK): ICD-10-CM

## 2017-12-11 PROCEDURE — 99999 PR PBB SHADOW E&M-EST. PATIENT-LVL III: CPT | Mod: PBBFAC,,, | Performed by: INTERNAL MEDICINE

## 2017-12-11 PROCEDURE — 99213 OFFICE O/P EST LOW 20 MIN: CPT | Mod: PBBFAC | Performed by: INTERNAL MEDICINE

## 2017-12-11 PROCEDURE — 93280 PM DEVICE PROGR EVAL DUAL: CPT | Mod: PBBFAC | Performed by: INTERNAL MEDICINE

## 2017-12-11 PROCEDURE — 93010 ELECTROCARDIOGRAM REPORT: CPT | Mod: S$PBB,,, | Performed by: INTERNAL MEDICINE

## 2017-12-11 PROCEDURE — 93005 ELECTROCARDIOGRAM TRACING: CPT | Mod: PBBFAC | Performed by: INTERNAL MEDICINE

## 2017-12-11 PROCEDURE — 99214 OFFICE O/P EST MOD 30 MIN: CPT | Mod: S$PBB,,, | Performed by: INTERNAL MEDICINE

## 2017-12-11 NOTE — PROGRESS NOTES
"Subjective:    Patient ID:  Jahaira Garcia is a 89 y.o. female who presents for follow-up of Pacemaker Check.     HPI       Ms. Garcia is an 89 y.o. F who presented to [facility] ED (06/22/16) after several days of progressive SOB and fatigue and was found to be in CHB (wide escape in 30's) with hemodynamic instability. Ms. Garcia has a hx of DM2, HTN, HLD, CKD, Pulmonary Fibrosis (on 2.5 L home O2 and prednisone). Ms. Garcia underwent successful PPM placement (06/24/16), after which sh experienced three episodes of PMT; PVARP adjusted without recurrence.     Ms. Garcia again presented to [facility] ED (09/11/16) following experiencing an episode of a "persistent 'spell' of verbal unresponsiveness, poor eye contact, slowness" and was found to be septic. Fever resolved during stay and no infection source found. Cardiology saw patient and interrogated pacemaker which revealed no adverse events. EEG was negative for epileptiform activity. 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 Her last two episodes occurred in setting of infection and/or fever, both of which lower seizure threshold. It was determined that her sepsis was due to a probable viral syndrome; she was managed conservatively and discharged.     Ms. Garcia reports continued fatigue and stable SOB/GAITAN (on 2L O2 via NC), per baseline. Ms. Garcia denies chest pain, dizziness, palpitations, or syncope.   PPM: good function, 65% V paced. No recent PAF.    PAF was detected by PPM; xarelto started.  Since last seen, underwent colonoscopy for GI bleeding. 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 for which no therapy is currently planned, per pt and daughter.  Hasn't f/u'd with GI.    My interpretation of today's ECG is A-Sense, V-pace    Review of Systems   Constitution: Positive for weakness and malaise/fatigue. Negative for decreased appetite and " diaphoresis.   HENT: Negative.  Negative for congestion, ear pain, nosebleeds and tinnitus.    Eyes: Negative for blurred vision and double vision.   Cardiovascular: Positive for dyspnea on exertion. Negative for chest pain, claudication, cyanosis, irregular heartbeat, leg swelling, near-syncope, orthopnea, palpitations, paroxysmal nocturnal dyspnea and syncope.   Respiratory: Positive for shortness of breath. Negative for cough, hemoptysis, sputum production and wheezing.    Endocrine: Negative.  Negative for polyuria.   Hematologic/Lymphatic: Does not bruise/bleed easily.   Skin: Negative.  Negative for rash.   Musculoskeletal: Positive for muscle weakness and stiffness. Negative for joint pain.   Gastrointestinal: Negative.  Negative for abdominal pain, change in bowel habit, hematochezia, melena, nausea and vomiting.   Genitourinary: Negative for frequency.   Neurological: Negative for dizziness, headaches and light-headedness.   Psychiatric/Behavioral: Negative.  Negative for altered mental status and depression. The patient is not nervous/anxious.    Allergic/Immunologic: Negative for environmental allergies.        Objective:    Physical Exam   Constitutional: She is oriented to person, place, and time. Vital signs are normal. She appears well-developed and well-nourished. She is active and cooperative.   HENT:   Head: Normocephalic and atraumatic.   Eyes: Conjunctivae and EOM are normal. Pupils are equal, round, and reactive to light.   Neck: Normal range of motion. Neck supple. Carotid bruit is not present. No tracheal deviation and no edema present. No thyroid mass and no thyromegaly present.   Cardiovascular: Normal rate, regular rhythm, normal heart sounds, intact distal pulses and normal pulses.   No extrasystoles are present. PMI is not displaced.  Exam reveals no gallop and no friction rub.    No murmur heard.  Pulmonary/Chest: Effort normal and breath sounds normal. No respiratory distress. She has  "no wheezes. She has no rales.   Abdominal: Soft. Normal appearance and bowel sounds are normal. She exhibits no distension. There is no hepatosplenomegaly.   Musculoskeletal: Normal range of motion.   Ms. Garcia is in a wheelchair today.    Neurological: She is alert and oriented to person, place, and time. Coordination normal.   Skin: Skin is warm and dry. No rash noted. She is not diaphoretic.   Psychiatric: She has a normal mood and affect. Her speech is normal and behavior is normal. Thought content normal. Cognition and memory are normal.   Nursing note and vitals reviewed.        Assessment:       1. Paroxysmal atrial fibrillation    2. CHB (complete heart block)    3. Bradycardia    4. Left bundle branch block    5. Essential hypertension    6. Pulmonary hypertension    7. CKD (chronic kidney disease), stage IV         Plan:       PPM  Good function. Frequently V paced.  Update echo, given frequent V pacing and GAITAN (that's multifactorial, though).  continue f/u in device clinic    PAF  Rare, but real (31 min on PPM). Would prefer a/c if possible.  Will cc Dr Bear on this message, for "clearance" for anticoagulant use.    GI  Pt will f/u with GI re: hx GI bleed and tumor visualized on colonoscopy.    Return in 1 year with echo, or earlier prn.                "

## 2017-12-12 DIAGNOSIS — I48.0 PAROXYSMAL ATRIAL FIBRILLATION: Primary | ICD-10-CM

## 2017-12-13 ENCOUNTER — TELEPHONE (OUTPATIENT)
Dept: PODIATRY | Facility: CLINIC | Age: 82
End: 2017-12-13

## 2017-12-13 NOTE — TELEPHONE ENCOUNTER
----- Message from Ana Luisa Thomson sent at 12/13/2017  1:03 PM CST -----  Contact: self@work  Patient would like a return call to discuss appointment.        I left patient a message that I was calling her back about her appointment

## 2017-12-15 ENCOUNTER — TELEPHONE (OUTPATIENT)
Dept: PULMONOLOGY | Facility: CLINIC | Age: 82
End: 2017-12-15

## 2017-12-15 DIAGNOSIS — J84.9 ILD (INTERSTITIAL LUNG DISEASE): ICD-10-CM

## 2017-12-15 DIAGNOSIS — J06.9 UPPER RESPIRATORY TRACT INFECTION, UNSPECIFIED TYPE: Primary | ICD-10-CM

## 2017-12-15 DIAGNOSIS — J84.9 ILD (INTERSTITIAL LUNG DISEASE): Primary | ICD-10-CM

## 2017-12-15 DIAGNOSIS — R06.09 DOE (DYSPNEA ON EXERTION): ICD-10-CM

## 2017-12-15 RX ORDER — AZITHROMYCIN 250 MG/1
TABLET, FILM COATED ORAL
Qty: 6 TABLET | Refills: 1 | Status: SHIPPED | OUTPATIENT
Start: 2017-12-15 | End: 2018-01-01

## 2017-12-15 RX ORDER — PREDNISONE 10 MG/1
TABLET ORAL
Qty: 60 TABLET | Refills: 1 | Status: SHIPPED | OUTPATIENT
Start: 2017-12-15 | End: 2018-03-12 | Stop reason: SDUPTHER

## 2017-12-15 RX ORDER — ALBUTEROL SULFATE 0.83 MG/ML
2.5 SOLUTION RESPIRATORY (INHALATION) EVERY 6 HOURS PRN
Qty: 90 EACH | Refills: 11 | Status: SHIPPED | OUTPATIENT
Start: 2017-12-15 | End: 2018-08-06 | Stop reason: SDUPTHER

## 2017-12-15 NOTE — TELEPHONE ENCOUNTER
----- Message from Jayna Frost sent at 12/15/2017 11:02 AM CST -----  Contact: Nasreen/Daughter [phone]   Has a cold and she coughing up mucus and wants to know if the doctor can order something for her mothers cold. Please call back to discuss

## 2017-12-15 NOTE — TELEPHONE ENCOUNTER
Nasreen, the daughter of Jahaira Garcia calls about cold symptoms and coughing up discolored phelgm. Dr Gordon escribed Zithromax 250mg and for Mrs Garcia to double her Prednisone today, Saturday and Sunday . Nasreen will call back if there is noimprovement. Taylor Harding.

## 2017-12-17 ENCOUNTER — NURSE TRIAGE (OUTPATIENT)
Dept: ADMINISTRATIVE | Facility: CLINIC | Age: 82
End: 2017-12-17

## 2017-12-17 NOTE — TELEPHONE ENCOUNTER
Daughter states that Nurse has been coming out for sore in leg. RN yest listened to chest and ? PNA. Hx pulm fibrosis. Cold started fri - on zpak now. SOB almost 24/7 despite O2. Now struggling with talking and eating with SOB. Daughter wants to know if nurse can come to house to check to see if pna worsening. rec to call .   Hx DM BS 71 yest am. Last pm 271. Today 115 fasting today. Daughter not currently with pt. Afeb. Daughter states that Pt states she doesn't want to go to ED. Daughter wants to know about hospice. Hx CKD 4, cscope for GIB at end of summer-susp tissue- no bx. Using resp tx. rec EMS for severe SOB, too weak to stand. Daughter reiterates pt not interested in going to ED. Offered  locations and hours. Offered local hospice info at daughters request. Call back with questions.     Reason for Disposition   [1] MODERATE difficulty breathing (e.g., speaks in phrases, SOB even at rest, pulse 100-120) AND [2] NEW-onset or WORSE than normal    Protocols used: ST BREATHING DIFFICULTY-A-AH

## 2017-12-18 ENCOUNTER — TELEPHONE (OUTPATIENT)
Dept: PULMONOLOGY | Facility: CLINIC | Age: 82
End: 2017-12-18

## 2017-12-18 DIAGNOSIS — J84.9 ILD (INTERSTITIAL LUNG DISEASE): Primary | ICD-10-CM

## 2017-12-18 NOTE — TELEPHONE ENCOUNTER
----- Message from More Mathew sent at 12/18/2017  9:08 AM CST -----  Contact: Pt Daughter  Nasreen  [phone]  Cecil  /   Pt is showing symptoms of pneumonia , seeking an same day appt , call back to verify [phone]  Thanks,

## 2017-12-21 ENCOUNTER — OFFICE VISIT (OUTPATIENT)
Dept: PULMONOLOGY | Facility: CLINIC | Age: 82
End: 2017-12-21
Payer: MEDICARE

## 2017-12-21 ENCOUNTER — HOSPITAL ENCOUNTER (OUTPATIENT)
Dept: RADIOLOGY | Facility: HOSPITAL | Age: 82
Discharge: HOME | End: 2017-12-21
Attending: INTERNAL MEDICINE
Payer: MEDICARE

## 2017-12-21 VITALS
BODY MASS INDEX: 30.2 KG/M2 | SYSTOLIC BLOOD PRESSURE: 138 MMHG | HEIGHT: 57 IN | WEIGHT: 140 LBS | HEART RATE: 61 BPM | OXYGEN SATURATION: 92 % | DIASTOLIC BLOOD PRESSURE: 72 MMHG

## 2017-12-21 DIAGNOSIS — J84.9 ILD (INTERSTITIAL LUNG DISEASE): Primary | ICD-10-CM

## 2017-12-21 DIAGNOSIS — J00 ACUTE CORYZA: ICD-10-CM

## 2017-12-21 DIAGNOSIS — J84.9 ILD (INTERSTITIAL LUNG DISEASE): ICD-10-CM

## 2017-12-21 PROCEDURE — 71020 XR CHEST PA AND LATERAL: CPT | Mod: TC

## 2017-12-21 PROCEDURE — 99213 OFFICE O/P EST LOW 20 MIN: CPT | Mod: PBBFAC,25 | Performed by: INTERNAL MEDICINE

## 2017-12-21 PROCEDURE — 99214 OFFICE O/P EST MOD 30 MIN: CPT | Mod: S$PBB,,, | Performed by: INTERNAL MEDICINE

## 2017-12-21 PROCEDURE — 71020 XR CHEST PA AND LATERAL: CPT | Mod: 26,,, | Performed by: RADIOLOGY

## 2017-12-21 PROCEDURE — 99999 PR PBB SHADOW E&M-EST. PATIENT-LVL III: CPT | Mod: PBBFAC,,, | Performed by: INTERNAL MEDICINE

## 2017-12-22 NOTE — PROGRESS NOTES
Subjective:       Patient ID: Jahaira Garcia is a 89 y.o. female.    Chief Complaint: Shortness of Breath; Cough; and URI    HPI  89 yo female who I follow for moderate but stable ILD  Comes for follow up. At the end of last week she got a URI, chills fever and a cough and breathing difficulty. I started her on a zpak and asked to double her prednisoe to 10 mg bid. Today she comes and looks like a felicitas. Chest x-ray shows mild to modearte fibrotic changes, but better than the film of September, 2016.  Pacemaker left anterior chest. Patient is  better.   Sa02:91 % on oxygen and pulse 94.      No flowsheet data found.]  Review of Systems   Constitutional: Negative.    HENT: Positive for rhinorrhea.    Eyes: Negative.    Respiratory: Positive for cough.         Hx  Of ILD    Cardiovascular: Negative.         Pacemaker   Genitourinary: Negative.    Endocrine: Type ii diabetes   Musculoskeletal: Negative.    Skin: Negative.    Gastrointestinal: Negative.    Neurological: Negative.    Psychiatric/Behavioral: Negative.        Objective:      Physical Exam   Constitutional: She is oriented to person, place, and time. She appears well-developed and well-nourished. No distress.    In a wheelchair on oxygen but spry and alert    had her 89th birthday 3 weeks ago!!   HENT:   Head: Normocephalic and atraumatic.   Right Ear: External ear normal.   Left Ear: External ear normal.   Nose: Nose normal.   Mouth/Throat: Oropharynx is clear and moist.   Eyes: Conjunctivae and EOM are normal. Pupils are equal, round, and reactive to light.   Neck: Normal range of motion. Neck supple. No JVD present. No thyromegaly present.   Cardiovascular: Normal rate, regular rhythm, normal heart sounds and intact distal pulses.  Exam reveals no gallop.    No murmur heard.  Pulmonary/Chest: Breath sounds normal. No stridor. No respiratory distress. She has no wheezes. She has no rales. She exhibits no tenderness.   Abdominal: Soft. Bowel sounds are  "normal. She exhibits no distension and no mass. There is no tenderness. There is no rebound and no guarding.   Musculoskeletal: Normal range of motion. She exhibits no edema.   Lymphadenopathy:     She has no cervical adenopathy.   Neurological: She is alert and oriented to person, place, and time. She has normal reflexes. She displays normal reflexes. No cranial nerve deficit.   Skin: Skin is warm and dry. No rash noted.   Psychiatric: She has a normal mood and affect. Her behavior is normal. Judgment and thought content normal.   Nursing note and vitals reviewed.          Assessment:       No diagnosis found.    Outpatient Encounter Prescriptions as of 12/21/2017   Medication Sig Dispense Refill    acetaminophen (TYLENOL) 325 MG tablet Take 1-2 tablets (325-650 mg total) by mouth nightly as needed for Pain.      albuterol (PROVENTIL) 2.5 mg /3 mL (0.083 %) nebulizer solution Take 3 mLs (2.5 mg total) by nebulization every 6 (six) hours as needed for Wheezing or Shortness of Breath. Rescue 90 each 11    albuterol-ipratropium 2.5mg-0.5mg/3mL (DUO-NEB) 0.5 mg-3 mg(2.5 mg base)/3 mL nebulizer solution Take 3 mLs by nebulization every evening. And every 6 hours as needed.  0    artificial tears (ISOPTO TEARS) 0.5 % ophthalmic solution Place 1 drop into both eyes as needed.      azithromycin (ZITHROMAX Z-GI) 250 MG tablet 2 tablets now, then 1 daily till finished 6 tablet 1    BD ULTRA-FINE CHARAN PEN NEEDLES 32 gauge x 5/32" Ndle USE ONE 3 TIMES DAILY 300 each 0    blood sugar diagnostic (ONETOUCH VERIO) Strp USE ONE 5 TIMES DAILY 450 each 12    carvedilol (COREG) 3.125 MG tablet Take 1 tablet (3.125 mg total) by mouth 2 (two) times daily. 180 tablet 2    FLUZONE HIGH-DOSE 2017-18, PF, 180 mcg/0.5 mL vaccine       FOLIC ACID/MULTIVIT-MIN/LUTEIN (CENTRUM SILVER ORAL) Take 1 tablet by mouth once daily.      insulin lispro (HUMALOG KWIKPEN) 100 unit/mL InPn pen Inject 22 Units into the skin 3 (three) times " daily. 3 Box 3    levalbuterol (XOPENEX) 0.63 mg/3 mL nebulizer solution USE ONE VIAL TWICE DAILY 504 mL 0    losartan (COZAAR) 50 MG tablet Take 1 tablet (50 mg total) by mouth 2 (two) times daily. 180 tablet 3    melatonin 1 mg Tab Take by mouth.      methyl salicylate-menthol (SALONPAS) 10-3 % SprA Apply topically.      mupirocin (BACTROBAN) 2 % ointment Apply topically 3 (three) times daily. 30 g 0    ONETOUCH DELICA LANCETS 33 gauge Misc USE THREE TIMES DAILY 200 each 3    polyethylene glycol (GLYCOLAX) 17 gram PwPk Take 17 g by mouth 3 (three) times daily as needed. 100 packet 0    pravastatin (PRAVACHOL) 20 MG tablet TAKE ONE DAILY 90 tablet 0    predniSONE (DELTASONE) 10 MG tablet 2 tablets every AM 60 tablet 1    predniSONE (DELTASONE) 5 MG tablet       psyllium (METAMUCIL) packet Take 1 packet by mouth once daily.  12    rivaroxaban (XARELTO) 15 mg Tab Take 1 tablet (15 mg total) by mouth daily with dinner or evening meal. 90 tablet 3    silver sulfADIAZINE 1% (SILVADENE) 1 % cream Apply topically once daily. 50 g 0     No facility-administered encounter medications on file as of 12/21/2017.      No orders of the defined types were placed in this encounter.    Plan:         Resolving coryza, ILD stable..  Chest x-ray is stable and better than September, 2016.  Will taper prednisone, 10 mg   AM and 5 mg PM for 5 days then back to maintenance of 10 mg

## 2017-12-27 DIAGNOSIS — J84.9 ILD (INTERSTITIAL LUNG DISEASE): Primary | ICD-10-CM

## 2017-12-29 ENCOUNTER — TELEPHONE (OUTPATIENT)
Dept: INTERNAL MEDICINE | Facility: CLINIC | Age: 82
End: 2017-12-29

## 2017-12-29 RX ORDER — MUPIROCIN 20 MG/G
OINTMENT TOPICAL 3 TIMES DAILY
Qty: 30 G | Refills: 0 | Status: SHIPPED | OUTPATIENT
Start: 2017-12-29 | End: 2018-05-18 | Stop reason: SDUPTHER

## 2017-12-29 NOTE — TELEPHONE ENCOUNTER
----- Message from Kimi Naylor MA sent at 12/29/2017  8:24 AM CST -----  [facility] [phone] [facility] is  requesting a refill on bactroban ointment.

## 2018-01-01 ENCOUNTER — HOSPITAL ENCOUNTER (INPATIENT)
Facility: HOSPITAL | Age: 83
LOS: 4 days | Discharge: HOME HEALTH | DRG: 871 | End: 2018-01-05
Attending: EMERGENCY MEDICINE | Admitting: EMERGENCY MEDICINE
Payer: MEDICARE

## 2018-01-01 DIAGNOSIS — R06.03 RESPIRATORY DISTRESS: ICD-10-CM

## 2018-01-01 DIAGNOSIS — J11.1 INFLUENZA: Primary | ICD-10-CM

## 2018-01-01 DIAGNOSIS — R50.9 FEVER, UNSPECIFIED FEVER CAUSE: ICD-10-CM

## 2018-01-01 DIAGNOSIS — J96.21 ACUTE ON CHRONIC RESPIRATORY FAILURE WITH HYPOXIA: ICD-10-CM

## 2018-01-01 DIAGNOSIS — R00.0 TACHYCARDIA: ICD-10-CM

## 2018-01-01 DIAGNOSIS — I10 ESSENTIAL HYPERTENSION: ICD-10-CM

## 2018-01-01 DIAGNOSIS — A41.9 SEPSIS, DUE TO UNSPECIFIED ORGANISM: ICD-10-CM

## 2018-01-01 PROBLEM — R65.20 SEVERE SEPSIS: Status: ACTIVE | Noted: 2018-01-01

## 2018-01-01 PROBLEM — J10.1 INFLUENZA A: Status: ACTIVE | Noted: 2018-01-01

## 2018-01-01 LAB
ABO + RH BLD: NORMAL
ALBUMIN SERPL BCP-MCNC: 3 G/DL
ALLENS TEST: ABNORMAL
ALP SERPL-CCNC: 49 U/L
ALT SERPL W/O P-5'-P-CCNC: 25 U/L
ANION GAP SERPL CALC-SCNC: 15 MMOL/L
ANISOCYTOSIS BLD QL SMEAR: SLIGHT
AST SERPL-CCNC: 42 U/L
BACTERIA #/AREA URNS AUTO: ABNORMAL /HPF
BASOPHILS # BLD AUTO: 0.06 K/UL
BASOPHILS NFR BLD: 0.4 %
BILIRUB SERPL-MCNC: 0.8 MG/DL
BILIRUB UR QL STRIP: NEGATIVE
BLD GP AB SCN CELLS X3 SERPL QL: NORMAL
BNP SERPL-MCNC: 1481 PG/ML
BUN SERPL-MCNC: 32 MG/DL
CALCIUM SERPL-MCNC: 9.2 MG/DL
CHLORIDE SERPL-SCNC: 97 MMOL/L
CLARITY UR REFRACT.AUTO: ABNORMAL
CO2 SERPL-SCNC: 21 MMOL/L
COLOR UR AUTO: YELLOW
CREAT SERPL-MCNC: 2 MG/DL
DELSYS: ABNORMAL
DIFFERENTIAL METHOD: ABNORMAL
EOSINOPHIL # BLD AUTO: 0 K/UL
EOSINOPHIL NFR BLD: 0.1 %
EP: 5
ERYTHROCYTE [DISTWIDTH] IN BLOOD BY AUTOMATED COUNT: 16.7 %
ERYTHROCYTE [SEDIMENTATION RATE] IN BLOOD BY WESTERGREN METHOD: 14 MM/H
EST. GFR  (AFRICAN AMERICAN): 25 ML/MIN/1.73 M^2
EST. GFR  (NON AFRICAN AMERICAN): 21.7 ML/MIN/1.73 M^2
FIO2: 40
FLUAV AG SPEC QL IA: POSITIVE
FLUBV AG SPEC QL IA: NEGATIVE
GLUCOSE SERPL-MCNC: 299 MG/DL
GLUCOSE UR QL STRIP: NEGATIVE
HCO3 UR-SCNC: 24.2 MMOL/L (ref 24–28)
HCT VFR BLD AUTO: 38.1 %
HGB BLD-MCNC: 12.3 G/DL
HGB UR QL STRIP: NEGATIVE
HYALINE CASTS UR QL AUTO: 1 /LPF
HYPOCHROMIA BLD QL SMEAR: ABNORMAL
IMM GRANULOCYTES # BLD AUTO: 0.29 K/UL
IMM GRANULOCYTES NFR BLD AUTO: 1.8 %
INR PPP: 1
IP: 10
KETONES UR QL STRIP: ABNORMAL
LACTATE SERPL-SCNC: 1.9 MMOL/L
LACTATE SERPL-SCNC: 2 MMOL/L
LACTATE SERPL-SCNC: 3.8 MMOL/L
LEUKOCYTE ESTERASE UR QL STRIP: NEGATIVE
LIPASE SERPL-CCNC: 29 U/L
LYMPHOCYTES # BLD AUTO: 5.3 K/UL
LYMPHOCYTES NFR BLD: 32.7 %
MAGNESIUM SERPL-MCNC: 1.7 MG/DL
MCH RBC QN AUTO: 30.4 PG
MCHC RBC AUTO-ENTMCNC: 32.3 G/DL
MCV RBC AUTO: 94 FL
MICROSCOPIC COMMENT: ABNORMAL
MIN VOL: 14
MODE: ABNORMAL
MONOCYTES # BLD AUTO: 2.1 K/UL
MONOCYTES NFR BLD: 13.1 %
NEUTROPHILS # BLD AUTO: 8.5 K/UL
NEUTROPHILS NFR BLD: 51.9 %
NITRITE UR QL STRIP: NEGATIVE
NRBC BLD-RTO: 1 /100 WBC
OVALOCYTES BLD QL SMEAR: ABNORMAL
PCO2 BLDA: 46.8 MMHG (ref 35–45)
PH SMN: 7.32 [PH] (ref 7.35–7.45)
PH UR STRIP: 5 [PH] (ref 5–8)
PHOSPHATE SERPL-MCNC: 2.4 MG/DL
PLATELET # BLD AUTO: 276 K/UL
PMV BLD AUTO: 9.5 FL
PO2 BLDA: 20 MMHG (ref 40–60)
POC BE: -2 MMOL/L
POC SATURATED O2: 29 % (ref 95–100)
POC TCO2: 26 MMOL/L (ref 24–29)
POCT GLUCOSE: 223 MG/DL (ref 70–110)
POCT GLUCOSE: 277 MG/DL (ref 70–110)
POCT GLUCOSE: 294 MG/DL (ref 70–110)
POIKILOCYTOSIS BLD QL SMEAR: SLIGHT
POLYCHROMASIA BLD QL SMEAR: ABNORMAL
POTASSIUM SERPL-SCNC: 5.2 MMOL/L
PROCALCITONIN SERPL IA-MCNC: 0.44 NG/ML
PROT SERPL-MCNC: 6.7 G/DL
PROT UR QL STRIP: ABNORMAL
PROTHROMBIN TIME: 10.5 SEC
RBC # BLD AUTO: 4.05 M/UL
RBC #/AREA URNS AUTO: 1 /HPF (ref 0–4)
SAMPLE: ABNORMAL
SITE: ABNORMAL
SODIUM SERPL-SCNC: 133 MMOL/L
SP GR UR STRIP: 1.02 (ref 1–1.03)
SP02: 99
SPECIMEN SOURCE: ABNORMAL
SPONT RATE: 33
T4 FREE SERPL-MCNC: 0.91 NG/DL
TROPONIN I SERPL DL<=0.01 NG/ML-MCNC: 0.23 NG/ML
TSH SERPL DL<=0.005 MIU/L-ACNC: 4.62 UIU/ML
URN SPEC COLLECT METH UR: ABNORMAL
UROBILINOGEN UR STRIP-ACNC: NEGATIVE EU/DL
WBC # BLD AUTO: 16.24 K/UL
WBC #/AREA URNS AUTO: 0 /HPF (ref 0–5)

## 2018-01-01 PROCEDURE — 84439 ASSAY OF FREE THYROXINE: CPT

## 2018-01-01 PROCEDURE — 27000190 HC CPAP FULL FACE MASK W/VALVE

## 2018-01-01 PROCEDURE — 87400 INFLUENZA A/B EACH AG IA: CPT | Mod: 59

## 2018-01-01 PROCEDURE — 86901 BLOOD TYPING SEROLOGIC RH(D): CPT

## 2018-01-01 PROCEDURE — 25000242 PHARM REV CODE 250 ALT 637 W/ HCPCS: Performed by: STUDENT IN AN ORGANIZED HEALTH CARE EDUCATION/TRAINING PROGRAM

## 2018-01-01 PROCEDURE — 99223 1ST HOSP IP/OBS HIGH 75: CPT | Mod: AI,,, | Performed by: HOSPITALIST

## 2018-01-01 PROCEDURE — 83605 ASSAY OF LACTIC ACID: CPT | Mod: 91

## 2018-01-01 PROCEDURE — 99285 EMERGENCY DEPT VISIT HI MDM: CPT | Mod: 25

## 2018-01-01 PROCEDURE — 84100 ASSAY OF PHOSPHORUS: CPT

## 2018-01-01 PROCEDURE — 80053 COMPREHEN METABOLIC PANEL: CPT

## 2018-01-01 PROCEDURE — 87040 BLOOD CULTURE FOR BACTERIA: CPT | Mod: 59

## 2018-01-01 PROCEDURE — 96372 THER/PROPH/DIAG INJ SC/IM: CPT

## 2018-01-01 PROCEDURE — 84145 PROCALCITONIN (PCT): CPT

## 2018-01-01 PROCEDURE — P9612 CATHETERIZE FOR URINE SPEC: HCPCS

## 2018-01-01 PROCEDURE — 11000001 HC ACUTE MED/SURG PRIVATE ROOM

## 2018-01-01 PROCEDURE — 82803 BLOOD GASES ANY COMBINATION: CPT

## 2018-01-01 PROCEDURE — 96365 THER/PROPH/DIAG IV INF INIT: CPT

## 2018-01-01 PROCEDURE — 84484 ASSAY OF TROPONIN QUANT: CPT

## 2018-01-01 PROCEDURE — 83735 ASSAY OF MAGNESIUM: CPT

## 2018-01-01 PROCEDURE — 25000003 PHARM REV CODE 250: Performed by: PHYSICIAN ASSISTANT

## 2018-01-01 PROCEDURE — 63600175 PHARM REV CODE 636 W HCPCS: Performed by: HOSPITALIST

## 2018-01-01 PROCEDURE — 82962 GLUCOSE BLOOD TEST: CPT

## 2018-01-01 PROCEDURE — 27000221 HC OXYGEN, UP TO 24 HOURS

## 2018-01-01 PROCEDURE — 25000003 PHARM REV CODE 250: Performed by: HOSPITALIST

## 2018-01-01 PROCEDURE — 85610 PROTHROMBIN TIME: CPT

## 2018-01-01 PROCEDURE — 81001 URINALYSIS AUTO W/SCOPE: CPT

## 2018-01-01 PROCEDURE — 94640 AIRWAY INHALATION TREATMENT: CPT

## 2018-01-01 PROCEDURE — 83880 ASSAY OF NATRIURETIC PEPTIDE: CPT

## 2018-01-01 PROCEDURE — 99291 CRITICAL CARE FIRST HOUR: CPT | Mod: ,,, | Performed by: EMERGENCY MEDICINE

## 2018-01-01 PROCEDURE — 96366 THER/PROPH/DIAG IV INF ADDON: CPT

## 2018-01-01 PROCEDURE — 85025 COMPLETE CBC W/AUTO DIFF WBC: CPT

## 2018-01-01 PROCEDURE — 63600175 PHARM REV CODE 636 W HCPCS: Performed by: PHYSICIAN ASSISTANT

## 2018-01-01 PROCEDURE — 25000242 PHARM REV CODE 250 ALT 637 W/ HCPCS: Performed by: HOSPITALIST

## 2018-01-01 PROCEDURE — 83690 ASSAY OF LIPASE: CPT

## 2018-01-01 PROCEDURE — 96367 TX/PROPH/DG ADDL SEQ IV INF: CPT

## 2018-01-01 PROCEDURE — 93010 ELECTROCARDIOGRAM REPORT: CPT | Mod: ,,, | Performed by: INTERNAL MEDICINE

## 2018-01-01 PROCEDURE — 93005 ELECTROCARDIOGRAM TRACING: CPT

## 2018-01-01 PROCEDURE — 96375 TX/PRO/DX INJ NEW DRUG ADDON: CPT

## 2018-01-01 PROCEDURE — 94660 CPAP INITIATION&MGMT: CPT

## 2018-01-01 PROCEDURE — 84443 ASSAY THYROID STIM HORMONE: CPT

## 2018-01-01 PROCEDURE — 99900035 HC TECH TIME PER 15 MIN (STAT)

## 2018-01-01 RX ORDER — SODIUM CHLORIDE 0.9 % (FLUSH) 0.9 %
3 SYRINGE (ML) INJECTION
Status: DISCONTINUED | OUTPATIENT
Start: 2018-01-01 | End: 2018-01-05 | Stop reason: HOSPADM

## 2018-01-01 RX ORDER — POLYETHYLENE GLYCOL 3350 17 G/17G
17 POWDER, FOR SOLUTION ORAL 3 TIMES DAILY PRN
Status: DISCONTINUED | OUTPATIENT
Start: 2018-01-01 | End: 2018-01-04

## 2018-01-01 RX ORDER — ACETAMINOPHEN 10 MG/ML
1000 INJECTION, SOLUTION INTRAVENOUS
Status: COMPLETED | OUTPATIENT
Start: 2018-01-01 | End: 2018-01-01

## 2018-01-01 RX ORDER — INSULIN ASPART 100 [IU]/ML
1-10 INJECTION, SOLUTION INTRAVENOUS; SUBCUTANEOUS EVERY 6 HOURS PRN
Status: DISCONTINUED | OUTPATIENT
Start: 2018-01-01 | End: 2018-01-05 | Stop reason: HOSPADM

## 2018-01-01 RX ORDER — ACETAMINOPHEN 325 MG/1
650 TABLET ORAL EVERY 4 HOURS PRN
Status: DISCONTINUED | OUTPATIENT
Start: 2018-01-01 | End: 2018-01-05 | Stop reason: HOSPADM

## 2018-01-01 RX ORDER — PREDNISONE 20 MG/1
60 TABLET ORAL DAILY
Status: DISCONTINUED | OUTPATIENT
Start: 2018-01-02 | End: 2018-01-03

## 2018-01-01 RX ORDER — ACETAMINOPHEN 325 MG/1
650 TABLET ORAL
Status: DISCONTINUED | OUTPATIENT
Start: 2018-01-01 | End: 2018-01-01

## 2018-01-01 RX ORDER — GLUCAGON 1 MG
1 KIT INJECTION
Status: DISCONTINUED | OUTPATIENT
Start: 2018-01-01 | End: 2018-01-05 | Stop reason: HOSPADM

## 2018-01-01 RX ORDER — SILVER SULFADIAZINE 10 G/1000G
CREAM TOPICAL DAILY
Status: DISCONTINUED | OUTPATIENT
Start: 2018-01-02 | End: 2018-01-04

## 2018-01-01 RX ORDER — CEFEPIME HYDROCHLORIDE 2 G/1
2 INJECTION, POWDER, FOR SOLUTION INTRAVENOUS
Status: COMPLETED | OUTPATIENT
Start: 2018-01-01 | End: 2018-01-01

## 2018-01-01 RX ORDER — IPRATROPIUM BROMIDE AND ALBUTEROL SULFATE 2.5; .5 MG/3ML; MG/3ML
3 SOLUTION RESPIRATORY (INHALATION)
Status: DISCONTINUED | OUTPATIENT
Start: 2018-01-01 | End: 2018-01-01

## 2018-01-01 RX ORDER — IPRATROPIUM BROMIDE AND ALBUTEROL SULFATE 2.5; .5 MG/3ML; MG/3ML
3 SOLUTION RESPIRATORY (INHALATION)
Status: DISCONTINUED | OUTPATIENT
Start: 2018-01-01 | End: 2018-01-02

## 2018-01-01 RX ORDER — ACETAMINOPHEN 10 MG/ML
1000 INJECTION, SOLUTION INTRAVENOUS EVERY 8 HOURS
Status: DISCONTINUED | OUTPATIENT
Start: 2018-01-01 | End: 2018-01-01

## 2018-01-01 RX ORDER — PREDNISONE 10 MG/1
10 TABLET ORAL DAILY
Status: DISCONTINUED | OUTPATIENT
Start: 2018-01-02 | End: 2018-01-01

## 2018-01-01 RX ORDER — IPRATROPIUM BROMIDE AND ALBUTEROL SULFATE 2.5; .5 MG/3ML; MG/3ML
3 SOLUTION RESPIRATORY (INHALATION) EVERY 4 HOURS
Status: DISCONTINUED | OUTPATIENT
Start: 2018-01-01 | End: 2018-01-01

## 2018-01-01 RX ORDER — OSELTAMIVIR PHOSPHATE 75 MG/1
75 CAPSULE ORAL
Status: COMPLETED | OUTPATIENT
Start: 2018-01-01 | End: 2018-01-01

## 2018-01-01 RX ORDER — CARVEDILOL 3.12 MG/1
3.12 TABLET ORAL 2 TIMES DAILY
Status: DISCONTINUED | OUTPATIENT
Start: 2018-01-01 | End: 2018-01-04

## 2018-01-01 RX ORDER — OSELTAMIVIR PHOSPHATE 75 MG/1
75 CAPSULE ORAL 2 TIMES DAILY
Status: DISCONTINUED | OUTPATIENT
Start: 2018-01-01 | End: 2018-01-01 | Stop reason: ALTCHOICE

## 2018-01-01 RX ORDER — PRAVASTATIN SODIUM 20 MG/1
20 TABLET ORAL DAILY
Status: DISCONTINUED | OUTPATIENT
Start: 2018-01-02 | End: 2018-01-04

## 2018-01-01 RX ORDER — OSELTAMIVIR PHOSPHATE 75 MG/1
75 CAPSULE ORAL 2 TIMES DAILY
Status: DISCONTINUED | OUTPATIENT
Start: 2018-01-01 | End: 2018-01-01

## 2018-01-01 RX ORDER — OSELTAMIVIR PHOSPHATE 6 MG/ML
30 FOR SUSPENSION ORAL DAILY
Status: DISCONTINUED | OUTPATIENT
Start: 2018-01-02 | End: 2018-01-04

## 2018-01-01 RX ADMIN — VANCOMYCIN HYDROCHLORIDE 1000 MG: 1 INJECTION, POWDER, LYOPHILIZED, FOR SOLUTION INTRAVENOUS at 10:01

## 2018-01-01 RX ADMIN — SODIUM CHLORIDE 2001 ML: 0.9 INJECTION, SOLUTION INTRAVENOUS at 10:01

## 2018-01-01 RX ADMIN — AZITHROMYCIN MONOHYDRATE 250 MG: 500 INJECTION, POWDER, LYOPHILIZED, FOR SOLUTION INTRAVENOUS at 06:01

## 2018-01-01 RX ADMIN — IPRATROPIUM BROMIDE AND ALBUTEROL SULFATE 3 ML: .5; 3 SOLUTION RESPIRATORY (INHALATION) at 03:01

## 2018-01-01 RX ADMIN — CEFEPIME 2 G: 2 INJECTION, POWDER, FOR SOLUTION INTRAVENOUS at 10:01

## 2018-01-01 RX ADMIN — INSULIN DETEMIR 12 UNITS: 100 INJECTION, SOLUTION SUBCUTANEOUS at 06:01

## 2018-01-01 RX ADMIN — ACETAMINOPHEN 1000 MG: 10 INJECTION, SOLUTION INTRAVENOUS at 11:01

## 2018-01-01 RX ADMIN — IPRATROPIUM BROMIDE AND ALBUTEROL SULFATE 3 ML: .5; 3 SOLUTION RESPIRATORY (INHALATION) at 09:01

## 2018-01-01 RX ADMIN — IPRATROPIUM BROMIDE AND ALBUTEROL SULFATE 3 ML: .5; 3 SOLUTION RESPIRATORY (INHALATION) at 01:01

## 2018-01-01 RX ADMIN — OSELTAMIVIR PHOSPHATE 75 MG: 75 CAPSULE ORAL at 01:01

## 2018-01-01 NOTE — ED PROVIDER NOTES
Encounter Date: 1/1/2018       History     Chief Complaint   Patient presents with    Altered Mental Status     cough/fever/ suspected UTI     88 y/o WF with PMHx of DM2, pulmonary fibrosis, hyperlipidemia, interstitial lung disease, heart block with a pacemaker presents to the ED via EMS with family for cough and fever. Per family, she lives alone and has sitters that stay with her. She had a worsening cough since last night. They checked her glucose this morning and it was low which prompted them to call EMS. Per EMS, when they arrived glucose 297. She developed fever this morning. She was recently on azithromycin and just completed a steroid taper. She is on 4L oxygen at home.      The history is provided by the patient.     Review of patient's allergies indicates:   Allergen Reactions    Tramadol      Confusion and sleeping and unsteady.     Past Medical History:   Diagnosis Date    Arthritis     Basal cell carcinoma     Complete heart block 2016    Diabetes mellitus type II     Dysphagia     GERD (gastroesophageal reflux disease)     Hiatal hernia     History of colon polyps     HTN (hypertension)     Hyperlipidemia     Lung disease, interstitial     Neuropathy     OA (osteoarthritis)     Pacemaker     [place]    Pulmonary fibrosis     PVC (premature ventricular contraction)     Steroid long-term use 11/8/2012     Past Surgical History:   Procedure Laterality Date    APPENDECTOMY      CATARACT EXTRACTION EXTRACAPSULAR W/ INTRAOCULAR LENS IMPLANTATION      X 2    CHOLECYSTECTOMY      COLONOSCOPY      COLONOSCOPY N/A 8/28/2017    Procedure: COLONOSCOPY;  Surgeon: GAGE Bear MD;  Location: [facility] ([address] [place] [address]);  Service: Endoscopy;  Laterality: N/A;  multiple medical issues/pul htn/systolic pressure 66 June 23 2016/on continuous O2/Xarelto/OK to hold 2 days per Dr Cornelius Ann/see telephone encounter dated 8/24/17/jaguarn    ESOPHAGOGASTRODUODENOSCOPY      INSERT / REPLACE / REMOVE  PACEMAKER  06/2016    [place]    TONSILLECTOMY, ADENOIDECTOMY      TOTAL KNEE ARTHROPLASTY       Family History   Problem Relation Age of Onset    Tuberculosis Mother     Tuberculosis Father      Social History   Substance Use Topics    Smoking status: Never Smoker    Smokeless tobacco: Never Used    Alcohol use No     Review of Systems   Unable to perform ROS: Mental status change   Constitutional: Positive for fever.   Respiratory: Positive for cough and shortness of breath.    Gastrointestinal: Negative for vomiting.   Skin: Positive for color change.   Psychiatric/Behavioral: Positive for confusion.       Physical Exam     Initial Vitals [01/01/18 0950]   BP Pulse Resp Temp SpO2   (!) 116/46 (!) 128 (!) 30 (!) 101 °F (38.3 °C) 96 %      MAP       69.33         Physical Exam    Nursing note and vitals reviewed.  Constitutional: She appears well-developed and well-nourished. She is not diaphoretic. She appears distressed.   HENT:   Head: Normocephalic and atraumatic.   Neck: Normal range of motion. Neck supple.   Cardiovascular: Regular rhythm and normal heart sounds. Tachycardia present.  Exam reveals no gallop and no friction rub.    No murmur heard.  No LE edema   Pulmonary/Chest: Tachypnea noted. She is in respiratory distress. She has wheezes. She has rhonchi. She has no rales.   Abdominal: Soft. Bowel sounds are normal. There is no tenderness. There is no rebound and no guarding.   Musculoskeletal:   Bruising noted to bilateral upper extremities.   Neurological:   Confused and not follow commands.   Skin: Skin is warm and dry. No rash noted. No erythema.         ED Course   Critical Care  Date/Time: 1/1/2018 11:34 AM  Performed by: HARLEY PATTERSON  Authorized by: HARLEY PATTERSON   Total critical care time (exclusive of procedural time) : 45 minutes  Critical care time was exclusive of teaching time and separately billable procedures and treating other patients.  Critical care was necessary to  treat or prevent imminent or life-threatening deterioration of the following conditions: respiratory failure, sepsis, renal failure, dehydration and CNS failure or compromise.  Critical care was time spent personally by me on the following activities: development of treatment plan with patient or surrogate, discussions with consultants, evaluation of patient's response to treatment, examination of patient, obtaining history from patient or surrogate, ordering and performing treatments and interventions, ordering and review of laboratory studies, ordering and review of radiographic studies, pulse oximetry, re-evaluation of patient's condition and review of old charts.        Labs Reviewed   B-TYPE NATRIURETIC PEPTIDE - Abnormal; Notable for the following:        Result Value    BNP 1,481 (*)     All other components within normal limits   CBC W/ AUTO DIFFERENTIAL - Abnormal; Notable for the following:     WBC 16.24 (*)     RDW 16.7 (*)     Immature Granulocytes 1.8 (*)     Gran # 8.5 (*)     Immature Grans (Abs) 0.29 (*)     Lymph # 5.3 (*)     Mono # 2.1 (*)     nRBC 1 (*)     All other components within normal limits   COMPREHENSIVE METABOLIC PANEL - Abnormal; Notable for the following:     Sodium 133 (*)     Potassium 5.2 (*)     CO2 21 (*)     Glucose 299 (*)     BUN, Bld 32 (*)     Creatinine 2.0 (*)     Albumin 3.0 (*)     Alkaline Phosphatase 49 (*)     AST 42 (*)     eGFR if  25.0 (*)     eGFR if non  21.7 (*)     All other components within normal limits   LACTIC ACID, PLASMA - Abnormal; Notable for the following:     Lactate (Lactic Acid) 3.8 (*)     All other components within normal limits   PHOSPHORUS - Abnormal; Notable for the following:     Phosphorus 2.4 (*)     All other components within normal limits   TROPONIN I - Abnormal; Notable for the following:     Troponin I 0.230 (*)     All other components within normal limits   TSH - Abnormal; Notable for the following:      TSH 4.617 (*)     All other components within normal limits   URINALYSIS, REFLEX TO URINE CULTURE - Abnormal; Notable for the following:     Appearance, UA Hazy (*)     Protein, UA 2+ (*)     Ketones, UA Trace (*)     All other components within normal limits    Narrative:     Preferred Collection Type->Urine, Catheterized   INFLUENZA A AND B ANTIGEN - Abnormal; Notable for the following:     Influenza A Ag, EIA Positive (*)     All other components within normal limits   URINALYSIS MICROSCOPIC - Abnormal; Notable for the following:     Bacteria, UA Many (*)     All other components within normal limits    Narrative:     Preferred Collection Type->Urine, Catheterized   POCT GLUCOSE - Abnormal; Notable for the following:     POCT Glucose 277 (*)     All other components within normal limits   ISTAT PROCEDURE - Abnormal; Notable for the following:     POC PH 7.321 (*)     POC PCO2 46.8 (*)     POC PO2 20 (*)     POC SATURATED O2 29 (*)     All other components within normal limits   CULTURE, BLOOD   CULTURE, BLOOD   LIPASE   MAGNESIUM   PROTIME-INR   LACTIC ACID, PLASMA   PROCALCITONIN   T4, FREE   TYPE & SCREEN   POCT GLUCOSE MONITORING CONTINUOUS        Imaging Results          X-Ray Chest AP Portable (Final result)  Result time 01/01/18 10:45:13    Final result by Houston Villa MD (01/01/18 10:45:13)                 Impression:     No significant/detrimental change        Electronically signed by: Dr. Houston Villa  Date:     01/01/18  Time:    10:45              Narrative:    Portable chest    Comparison: December 21, 2017    Findings:  Cardiac silhouette and mediastinum are stable. Lungs the are similar with diffuse interstitial changes. Hazy left mid and lower lung opacity remains.                                 Medical Decision Making:   History:   Old Medical Records: I decided to obtain old medical records.  Old Records Summarized: records from clinic visits.       <> Summary of Records:  Followed by pulmonology Dr Gordon, recently diagnosed with pulmonary fibrosis. Started on azithromycin on 12/15/17, recently tapered off prednisone.  Clinical Tests:   Lab Tests: Ordered and Reviewed  Radiological Study: Ordered and Reviewed  Medical Tests: Ordered and Reviewed  Other:   I have discussed this case with another health care provider.       APC / Resident Notes:   89 y/o WF with PMHx of DM2, pulmonary fibrosis, hyperlipidemia, interstitial lung disease, heart block with a pacemaker presents to the ED via EMS with family for cough and fever. Febrile and tachycardic. Wheezes and rhonchi noted throughout. Patient is tachypneic and in respiratory distress. Will place on BiPAP. Abdomen soft and nontender. Patient is confused and is not following commands. Bruising noted to the bilateral UE. Will check labs.  Sepsis orders initiated - started on IVF, vancomycin and cefepime.    10:25 AM  Discussed with family about possible intubation. Per family, patient wishes to be DNR. Discussed with her daughter, Nasreen Ceron who has POA. DNR form filled out and signed.    Leukocytosis noted with WBC 16.24.  Hyperkalemia at 5.2 and OLIVIA noted with creatinine of 2.0. Lactic acid elevated at 3.8. Troponin elevated at 0.230 - likely from sepsis. Influenza positive.    CXR with no significant change.    Due to lactic acidosis, respiratory distress will consult ICU for eval.    12:47 PM  ICU evaluated in the ED. Patient is DNR. Feel that she is appropriate for the floor. Will admit to internal medicine for influenza and BiPAP.         Attending Attestation:     Physician Attestation Statement for NP/PA:   I have conducted a face to face encounter with this patient in addition to the NP/PA, due to Medical Complexity    Other NP/PA Attestation Additions:    History of Present Illness: 88 yo f, h/o pulmonary fibrosis, here with URI sx x 2 days, worsening over past 24 hours, developed AMS/resp distress this am.  On exam, pt  febrile/ill-appearing/lethargic.  Influenza A +.  Placed on BIPAP for severe tachypnea/labored breathing.  Plan for coverage with both tamiflu and broad-spectrum antibiotics.  On BIPAP now for vent support.  Discussion with family about GOC - pt would not want intubation/mechanical ventilation.  Plans for noninvasive management of influenza/respiratory failure.                   ED Course      Clinical Impression:   The primary encounter diagnosis was Influenza. Diagnoses of Sepsis, due to unspecified organism, Respiratory distress, and Fever, unspecified fever cause were also pertinent to this visit.    Disposition:   Disposition: Admitted  Condition: Serious                        Lizzy Bergman PA-C  01/01/18 [id number]

## 2018-01-01 NOTE — ED NOTES
The patient is asleep. Family at bedside.    No apparent distress noted. Airway is open and patent.  Respirations with normal effort and rate noted. Explanation of care provided to family and patient. No needs at this time. Will continue to monitor.

## 2018-01-01 NOTE — ED NOTES
I requested a telemetry box and was informed that there were none available at this time. KVNG Pedro notified.

## 2018-01-01 NOTE — HOSPITAL COURSE
Jahaira Garcia is a 89 y.o. female with PMHx ILD w/ fibrosis, HTN, HLD, Complete heart block (s/p pacemarker) DM2 and chronic steroid use who presents to [facility] ED at behest of HH nursing for acute encephalopathy. She is on 4L O2 at baseline at home. Family is at bedside and  Reports increased confusion and decreased responsiveness at home as well as fevers, increased cough productive of sputum. Unknown if any sick contacts.     Per family and pulm clinic notes (patient of Dr. Gordon) she is on 4L O2 and tachypneic at baseline. She has extensive documentation regarding goals of care and advance directives, specifically no intubation and no chest compressions. The daughter is POA with documentation in the system and not only agreed but emphasized these directives. The patient was recently treated with a short course of azithromycin for presumed bronchitis in the past week and has completed therapy.     Workup in ED was notable for a mildy elevated lactic acid of 3.8 which is pre-fluid bolus. UA was unremarkable and CXR is largely unchanged, however WCC is Elevated. She was placed on home bipap and critical care was consulted for sepsis and hypoxic respiratory failure.

## 2018-01-01 NOTE — ED TRIAGE NOTES
Pt presents with respiratory distress and altered mental status. Pt has a history of pulmonary fibrosis. Symptoms began this morning. Family states that pt was acting like herself yesterday.

## 2018-01-01 NOTE — CONSULTS
[facility]  Critical Care Medicine  Consult Note    Patient Name: Jahaira Garcia  MRN: [id number]  Admission Date: 1/1/2018  Hospital Length of Stay: 0 days  Code Status: DNR  Attending Physician: Shantal Willingham MD   Primary Care Provider: Gayathri Resendiz MD   Principal Problem: <principal problem not specified>    Inpatient consult to Critical Care Medicine  Consult performed by: DEBORAH PERKINS II  Consult ordered by: GAGE KAY  Reason for consult: Sepsis on bipap ILD hypoxia  Assessment/Recommendations:   -Stable for floor  -Do not recommend ICU admission as patient has documented that she does not desire advanced interventions.  -Have placed patient on home O2 (4L) and Bipap 15/5 with good effect at bedside.   -OK to use BIPAP for comfort.  -Would change Abx to Zosyn given history of ILD and possible PNA. C/w Vanco.  -Continue with tamiflu.  -Continue with scheduled duonebs.    -Extensive discussion with family at bedside; if patient declines or needs to be intubated they would be amenable to prioritizing comfort over aggressive measures.        Subjective:     HPI:  No notes on file    Hospital/ICU Course:  Jahaira Garcia is a 89 y.o. female with PMHx ILD w/ fibrosis, HTN, HLD, Complete heart block (s/p pacemarker) DM2 and chronic steroid use who presents to [facility] at behest of HH nursing for acute encephalopathy. She is on 4L O2 at baseline at home. Family is at bedside and  Reports increased confusion and decreased responsiveness at home as well as fevers, increased cough productive of sputum. Unknown if any sick contacts.     Per family and pulm clinic notes (patient of Dr. Gordon) she is on 4L O2 and tachypneic at baseline. She has extensive documentation regarding goals of care and advance directives, specifically no intubation and no chest compressions. The daughter is POA with documentation in the system and not only agreed but emphasized these directives. The  patient was recently treated with a short course of azithromycin for presumed bronchitis in the past week and has completed therapy.     Workup in ED was notable for a mildy elevated lactic acid of 3.8 which is pre-fluid bolus. UA was unremarkable and CXR is largely unchanged, however WCC is Elevated. She was placed on home bipap and critical care was consulted for sepsis and hypoxic respiratory failure.     Past Medical History:   Diagnosis Date    Arthritis     Basal cell carcinoma     Complete heart block 2016    Diabetes mellitus type II     Dysphagia     GERD (gastroesophageal reflux disease)     Hiatal hernia     History of colon polyps     HTN (hypertension)     Hyperlipidemia     Lung disease, interstitial     Neuropathy     OA (osteoarthritis)     Pacemaker     [employer] [place]    Pulmonary fibrosis     PVC (premature ventricular contraction)     Steroid long-term use 11/8/2012       Past Surgical History:   Procedure Laterality Date    APPENDECTOMY      CATARACT EXTRACTION EXTRACAPSULAR W/ INTRAOCULAR LENS IMPLANTATION      X 2    CHOLECYSTECTOMY      COLONOSCOPY      COLONOSCOPY N/A 8/28/2017    Procedure: COLONOSCOPY;  Surgeon: GAGE Bear MD;  Location: [facility] ([address] [place] [address]);  Service: Endoscopy;  Laterality: N/A;  multiple medical issues/pul htn/systolic pressure 66 June 23 2016/on continuous O2/Xarelto/OK to hold 2 days per Dr Cornelius Ann/see telephone encounter dated 8/24/17/svn    ESOPHAGOGASTRODUODENOSCOPY      INSERT / REPLACE / REMOVE PACEMAKER  06/2016    [place]    TONSILLECTOMY, ADENOIDECTOMY      TOTAL KNEE ARTHROPLASTY         Review of patient's allergies indicates:   Allergen Reactions    Tramadol      Confusion and sleeping and unsteady.       Family History     Problem Relation (Age of Onset)    Tuberculosis Mother, Father        Social History Main Topics    Smoking status: Never Smoker    Smokeless tobacco: Never Used    Alcohol use No    Drug use: No     Sexual activity: No      Review of Systems   Constitutional: Negative for chills.   HENT: Negative for congestion and rhinorrhea.    Eyes: Negative for redness and itching.   Respiratory: Positive for cough and shortness of breath. Negative for chest tightness and wheezing.    Cardiovascular: Negative for leg swelling.   Gastrointestinal: Negative for abdominal pain, constipation, diarrhea, nausea and vomiting.   Genitourinary: Negative for dysuria and hematuria.   Musculoskeletal: Negative for arthralgias and myalgias.   Skin: Negative for rash and wound.   Neurological: Negative for dizziness and headaches.   Psychiatric/Behavioral: Positive for confusion.     Objective:     Vital Signs (Most Recent):  Temp: (!) 101 °F (38.3 °C) (01/01/18 1143)  Pulse: 96 (01/01/18 1202)  Resp: (!) 25 (01/01/18 1202)  BP: (!) 123/45 (01/01/18 1202)  SpO2: 96 % (01/01/18 1202) Vital Signs (24h Range):  Temp:  [101 °F (38.3 °C)] 101 °F (38.3 °C)  Pulse:  [] 96  Resp:  [24-46] 25  SpO2:  [96 %-99 %] 96 %  BP: (116-140)/(45-81) 123/45   Weight: 66.7 kg (147 lb)  Body mass index is 31.81 kg/m².      Intake/Output Summary (Last 24 hours) at 01/01/18 1258  Last data filed at 01/01/18 1156   Gross per 24 hour   Intake              100 ml   Output                0 ml   Net              100 ml       Physical Exam   Constitutional: She is oriented to person, place, and time. She appears well-developed and well-nourished.   Cardiovascular: Normal rate, S1 normal and S2 normal.  Exam reveals no gallop and no friction rub.    No murmur heard.  Pulmonary/Chest: No accessory muscle usage. Tachypnea noted. No respiratory distress. She has no decreased breath sounds. She has wheezes. She has rales.   On bipap 10/5   Neurological: She is alert and oriented to person, place, and time. No cranial nerve deficit.   Skin:   Wound LLE. Appears C/D/I.       Vents:  Oxygen Concentration (%): 40 (01/01/18 1030)  Lines/Drains/Airways          No  matching active lines, drains, or airways        Significant Labs:    CBC/Anemia Profile:    Recent Labs  Lab 01/01/18  1016   WBC 16.24*   HGB 12.3   HCT 38.1      MCV 94   RDW 16.7*        Chemistries:    Recent Labs  Lab 01/01/18  1016   *   K 5.2*   CL 97   CO2 21*   BUN 32*   CREATININE 2.0*   CALCIUM 9.2   ALBUMIN 3.0*   PROT 6.7   BILITOT 0.8   ALKPHOS 49*   ALT 25   AST 42*   MG 1.7   PHOS 2.4*       Lactate (Lactic Acid)   Date Value Ref Range Status   01/01/2018 3.8 (HH) 0.5 - 2.2 mmol/L Final     Comment:     Falsely low lactic acid results can be found in samples   containing >=13.0 mg/dL total bilirubin and/or >=3.5 mg/dL   direct bilirubin.  *Critical value -   Results called to and read back by:GARRET SHAW RN         ABG    Recent Labs  Lab 01/01/18  1104   PH 7.321*   PO2 20*   PCO2 46.8*   HCO3 24.2   BE -2       Recent Labs  Lab 01/01/18  1016   TROPONINI 0.230*     BNP    Recent Labs  Lab 01/01/18  1016   BNP 1,481*         Significant Imaging:   CXR 1/1/2018  Findings:  Cardiac silhouette and mediastinum are stable. Lungs the are similar with diffuse interstitial changes. Hazy left mid and lower lung opacity remains. No significant/detrimental change compared to prior study 12/21.    Assessment/Plan:     No notes have been filed under this hospital service.  Service: Critical Care Medicine      Critical Care Daily Checklist:    A: Awake: RASS Goal/Actual Goal:    Actual:     B: Spontaneous Breathing Trial Performed?     C: SAT & SBT Coordinated?               NA   D: Delirium: CAM-ICU     E: Early Mobility Performed? NA   F: Feeding Goal:    Status:     Current Diet Order   No orders of the defined types were placed in this encounter.      AS: Analgesia/Sedation NA   T: Thromboembolic Prophylaxis NA   H: HOB > 300 Yes   U: Stress Ulcer Prophylaxis (if needed) NA   G: Glucose Control NA   B: Bowel Function     I: Indwelling Catheter (Lines & Adrian) Necessity NA   D:  De-escalation of Antimicrobials/Pharmacotherapies NA    Plan for the day/ETD Admit to hospital medicine.    Code Status:  Family/Goals of Care: DNR  See above.        Critical care was time spent personally by me on the following activities: development of treatment plan with patient or surrogate and bedside caregivers, discussions with consultants, evaluation of patient's response to treatment, examination of patient, ordering and performing treatments and interventions, ordering and review of laboratory studies, ordering and review of radiographic studies, pulse oximetry, re-evaluation of patient's condition. This critical care time did not overlap with that of any other provider or involve time for any procedures.    Thank you for your consult. I will sign off. Please contact us if you have any additional questions.     ELI Caballero II  Critical Care Medicine  [facility]

## 2018-01-01 NOTE — ED NOTES
The patient is awake, alert and acting age appropriately.    No apparent distress noted. Airway is open and patent.  Respirations with normal effort and rate noted.  No needs at this time. Will continue to monitor.

## 2018-01-01 NOTE — ED NOTES
Pt is less labored and respirations have decreased since BIPAP initiated. Family at bedside. Will continue to monitor.

## 2018-01-01 NOTE — ED NOTES
LOC: The patient is awake, alert and confused, the patient is oriented x 2 and speaking incoherently.  APPEARANCE: Patient is in acute respiratory distress, patient is clean and well groomed, patient's clothing is properly fastened.  SKIN: The skin is warm and dry, patient has normal skin turgor and moist mucus membranes, wounds are noted to the lower extremities. Dressing is CDI. Pt sees home wound care. No skin breakdown noted to sacrum brusing noted to the upper extremities.  MUSKULOSKELETAL: Patient is weak in all extremities, no obvious swelling or deformities noted. Pt has severe sensitivity to right arm.   RESPIRATORY: Airway is open and patent, respirations are spontaneous, Pt is labored with increased respiratory rate. Breath sounds are course and diminished bilaterally.  CARDIAC: Normal heart sounds. Peripheral edema.  ABDOMEN: Soft and non tender to palpation, no distention noted. Bowel sounds present.  NEURO: No neuro deficits, hand grasp equal, no drift noted, no facial droop noted. Speech is clear.

## 2018-01-01 NOTE — SUBJECTIVE & OBJECTIVE
Past Medical History:   Diagnosis Date    Arthritis     Basal cell carcinoma     Complete heart block 2016    Diabetes mellitus type II     Dysphagia     GERD (gastroesophageal reflux disease)     Hiatal hernia     History of colon polyps     HTN (hypertension)     Hyperlipidemia     Lung disease, interstitial     Neuropathy     OA (osteoarthritis)     Pacemaker     [place]    Pulmonary fibrosis     PVC (premature ventricular contraction)     Steroid long-term use 11/8/2012       Past Surgical History:   Procedure Laterality Date    APPENDECTOMY      CATARACT EXTRACTION EXTRACAPSULAR W/ INTRAOCULAR LENS IMPLANTATION      X 2    CHOLECYSTECTOMY      COLONOSCOPY      COLONOSCOPY N/A 8/28/2017    Procedure: COLONOSCOPY;  Surgeon: GAGE Bear MD;  Location: [facility] ([address] [place] [address]);  Service: Endoscopy;  Laterality: N/A;  multiple medical issues/pul htn/systolic pressure 66 June 23 2016/on continuous O2/Xarelto/OK to hold 2 days per Dr Cornelius Ann/see telephone encounter dated 8/24/17/svn    ESOPHAGOGASTRODUODENOSCOPY      INSERT / REPLACE / REMOVE PACEMAKER  06/2016    [place]    TONSILLECTOMY, ADENOIDECTOMY      TOTAL KNEE ARTHROPLASTY         Review of patient's allergies indicates:   Allergen Reactions    Tramadol      Confusion and sleeping and unsteady.       Family History     Problem Relation (Age of Onset)    Tuberculosis Mother, Father        Social History Main Topics    Smoking status: Never Smoker    Smokeless tobacco: Never Used    Alcohol use No    Drug use: No    Sexual activity: No      Review of Systems   Constitutional: Negative for chills.   HENT: Negative for congestion and rhinorrhea.    Eyes: Negative for redness and itching.   Respiratory: Positive for cough and shortness of breath. Negative for chest tightness and wheezing.    Cardiovascular: Negative for leg swelling.   Gastrointestinal: Negative for abdominal pain, constipation, diarrhea, nausea and vomiting.    Genitourinary: Negative for dysuria and hematuria.   Musculoskeletal: Negative for arthralgias and myalgias.   Skin: Negative for rash and wound.   Neurological: Negative for dizziness and headaches.   Psychiatric/Behavioral: Positive for confusion.     Objective:     Vital Signs (Most Recent):  Temp: (!) 101 °F (38.3 °C) (01/01/18 1143)  Pulse: 96 (01/01/18 1202)  Resp: (!) 25 (01/01/18 1202)  BP: (!) 123/45 (01/01/18 1202)  SpO2: 96 % (01/01/18 1202) Vital Signs (24h Range):  Temp:  [101 °F (38.3 °C)] 101 °F (38.3 °C)  Pulse:  [] 96  Resp:  [24-46] 25  SpO2:  [96 %-99 %] 96 %  BP: (116-140)/(45-81) 123/45   Weight: 66.7 kg (147 lb)  Body mass index is 31.81 kg/m².      Intake/Output Summary (Last 24 hours) at 01/01/18 1258  Last data filed at 01/01/18 1156   Gross per 24 hour   Intake              100 ml   Output                0 ml   Net              100 ml       Physical Exam   Constitutional: She is oriented to person, place, and time. She appears well-developed and well-nourished.   Cardiovascular: Normal rate, S1 normal and S2 normal.  Exam reveals no gallop and no friction rub.    No murmur heard.  Pulmonary/Chest: No accessory muscle usage. Tachypnea noted. No respiratory distress. She has no decreased breath sounds. She has wheezes. She has rales.   On bipap 10/5   Neurological: She is alert and oriented to person, place, and time. No cranial nerve deficit.   Skin:   Wound LLE. Appears C/D/I.       Vents:  Oxygen Concentration (%): 40 (01/01/18 1030)  Lines/Drains/Airways          No matching active lines, drains, or airways        Significant Labs:    CBC/Anemia Profile:    Recent Labs  Lab 01/01/18  1016   WBC 16.24*   HGB 12.3   HCT 38.1      MCV 94   RDW 16.7*        Chemistries:    Recent Labs  Lab 01/01/18  1016   *   K 5.2*   CL 97   CO2 21*   BUN 32*   CREATININE 2.0*   CALCIUM 9.2   ALBUMIN 3.0*   PROT 6.7   BILITOT 0.8   ALKPHOS 49*   ALT 25   AST 42*   MG 1.7   PHOS 2.4*        Lactate (Lactic Acid)   Date Value Ref Range Status   01/01/2018 3.8 (HH) 0.5 - 2.2 mmol/L Final     Comment:     Falsely low lactic acid results can be found in samples   containing >=13.0 mg/dL total bilirubin and/or >=3.5 mg/dL   direct bilirubin.  *Critical value -   Results called to and read back by:GARRET SHAW RN         ABG    Recent Labs  Lab 01/01/18  1104   PH 7.321*   PO2 20*   PCO2 46.8*   HCO3 24.2   BE -2       Recent Labs  Lab 01/01/18  1016   TROPONINI 0.230*     BNP    Recent Labs  Lab 01/01/18  1016   BNP 1,481*         Significant Imaging:   CXR 1/1/2018  Findings:  Cardiac silhouette and mediastinum are stable. Lungs the are similar with diffuse interstitial changes. Hazy left mid and lower lung opacity remains. No significant/detrimental change compared to prior study 12/21.

## 2018-01-01 NOTE — ED NOTES
[facility] Awaiting telemetry box.

## 2018-01-01 NOTE — ED NOTES
I attempted to call report. SAHRA CALDERON states he needs to speak to his charge nurse to see if they are taking the patient and he will call me back.

## 2018-01-02 PROBLEM — J96.21 ACUTE ON CHRONIC RESPIRATORY FAILURE WITH HYPOXIA: Status: ACTIVE | Noted: 2018-01-02

## 2018-01-02 LAB
ANION GAP SERPL CALC-SCNC: 11 MMOL/L
BASOPHILS # BLD AUTO: 0.04 K/UL
BASOPHILS NFR BLD: 0.3 %
BUN SERPL-MCNC: 31 MG/DL
CALCIUM SERPL-MCNC: 8 MG/DL
CHLORIDE SERPL-SCNC: 104 MMOL/L
CO2 SERPL-SCNC: 20 MMOL/L
CREAT SERPL-MCNC: 1.5 MG/DL
DIFFERENTIAL METHOD: ABNORMAL
EOSINOPHIL # BLD AUTO: 0.1 K/UL
EOSINOPHIL NFR BLD: 0.4 %
ERYTHROCYTE [DISTWIDTH] IN BLOOD BY AUTOMATED COUNT: 17.2 %
EST. GFR  (AFRICAN AMERICAN): 35.4 ML/MIN/1.73 M^2
EST. GFR  (NON AFRICAN AMERICAN): 30.7 ML/MIN/1.73 M^2
GLUCOSE SERPL-MCNC: 160 MG/DL
HCT VFR BLD AUTO: 34.9 %
HGB BLD-MCNC: 11.4 G/DL
IMM GRANULOCYTES # BLD AUTO: 0.14 K/UL
IMM GRANULOCYTES NFR BLD AUTO: 1.2 %
LYMPHOCYTES # BLD AUTO: 2.5 K/UL
LYMPHOCYTES NFR BLD: 21.1 %
MAGNESIUM SERPL-MCNC: 1.7 MG/DL
MCH RBC QN AUTO: 30.6 PG
MCHC RBC AUTO-ENTMCNC: 32.7 G/DL
MCV RBC AUTO: 94 FL
MONOCYTES # BLD AUTO: 1.2 K/UL
MONOCYTES NFR BLD: 10 %
NEUTROPHILS # BLD AUTO: 7.8 K/UL
NEUTROPHILS NFR BLD: 67 %
NRBC BLD-RTO: 0 /100 WBC
PLATELET # BLD AUTO: 218 K/UL
PMV BLD AUTO: 9.2 FL
POCT GLUCOSE: 151 MG/DL (ref 70–110)
POCT GLUCOSE: 220 MG/DL (ref 70–110)
POCT GLUCOSE: 243 MG/DL (ref 70–110)
POCT GLUCOSE: 311 MG/DL (ref 70–110)
POTASSIUM SERPL-SCNC: 4.5 MMOL/L
RBC # BLD AUTO: 3.73 M/UL
SODIUM SERPL-SCNC: 135 MMOL/L
WBC # BLD AUTO: 11.71 K/UL

## 2018-01-02 PROCEDURE — 94640 AIRWAY INHALATION TREATMENT: CPT

## 2018-01-02 PROCEDURE — 83735 ASSAY OF MAGNESIUM: CPT

## 2018-01-02 PROCEDURE — 36415 COLL VENOUS BLD VENIPUNCTURE: CPT

## 2018-01-02 PROCEDURE — 80048 BASIC METABOLIC PNL TOTAL CA: CPT

## 2018-01-02 PROCEDURE — 11000001 HC ACUTE MED/SURG PRIVATE ROOM

## 2018-01-02 PROCEDURE — 27000221 HC OXYGEN, UP TO 24 HOURS

## 2018-01-02 PROCEDURE — 94660 CPAP INITIATION&MGMT: CPT

## 2018-01-02 PROCEDURE — 25000003 PHARM REV CODE 250: Performed by: HOSPITALIST

## 2018-01-02 PROCEDURE — 25000242 PHARM REV CODE 250 ALT 637 W/ HCPCS: Performed by: HOSPITALIST

## 2018-01-02 PROCEDURE — 99233 SBSQ HOSP IP/OBS HIGH 50: CPT | Mod: ,,, | Performed by: HOSPITALIST

## 2018-01-02 PROCEDURE — 63600175 PHARM REV CODE 636 W HCPCS: Performed by: NURSE PRACTITIONER

## 2018-01-02 PROCEDURE — 99900035 HC TECH TIME PER 15 MIN (STAT)

## 2018-01-02 PROCEDURE — 85025 COMPLETE CBC W/AUTO DIFF WBC: CPT

## 2018-01-02 PROCEDURE — 63600175 PHARM REV CODE 636 W HCPCS: Performed by: HOSPITALIST

## 2018-01-02 RX ORDER — IPRATROPIUM BROMIDE AND ALBUTEROL SULFATE 2.5; .5 MG/3ML; MG/3ML
3 SOLUTION RESPIRATORY (INHALATION) EVERY 6 HOURS
Status: DISPENSED | OUTPATIENT
Start: 2018-01-03 | End: 2018-01-04

## 2018-01-02 RX ORDER — ACETAMINOPHEN 10 MG/ML
1000 INJECTION, SOLUTION INTRAVENOUS ONCE
Status: COMPLETED | OUTPATIENT
Start: 2018-01-02 | End: 2018-01-02

## 2018-01-02 RX ORDER — GUAIFENESIN 100 MG/5ML
200 SOLUTION ORAL EVERY 4 HOURS PRN
Status: DISCONTINUED | OUTPATIENT
Start: 2018-01-02 | End: 2018-01-05 | Stop reason: HOSPADM

## 2018-01-02 RX ADMIN — PREDNISONE 60 MG: 20 TABLET ORAL at 10:01

## 2018-01-02 RX ADMIN — IPRATROPIUM BROMIDE AND ALBUTEROL SULFATE 3 ML: .5; 3 SOLUTION RESPIRATORY (INHALATION) at 12:01

## 2018-01-02 RX ADMIN — ACETAMINOPHEN 1000 MG: 10 INJECTION, SOLUTION INTRAVENOUS at 03:01

## 2018-01-02 RX ADMIN — VANCOMYCIN HYDROCHLORIDE 750 MG: 750 INJECTION, POWDER, LYOPHILIZED, FOR SOLUTION INTRAVENOUS at 01:01

## 2018-01-02 RX ADMIN — SILVER SULFADIAZINE: 10 CREAM TOPICAL at 10:01

## 2018-01-02 RX ADMIN — INSULIN DETEMIR 12 UNITS: 100 INJECTION, SOLUTION SUBCUTANEOUS at 08:01

## 2018-01-02 RX ADMIN — VANCOMYCIN HYDROCHLORIDE 750 MG: 750 INJECTION, POWDER, LYOPHILIZED, FOR SOLUTION INTRAVENOUS at 03:01

## 2018-01-02 RX ADMIN — CEFEPIME HYDROCHLORIDE 2 G: 2 INJECTION, POWDER, FOR SOLUTION INTRAVENOUS at 10:01

## 2018-01-02 RX ADMIN — GUAIFENESIN 200 MG: 200 SOLUTION ORAL at 08:01

## 2018-01-02 RX ADMIN — CARVEDILOL 3.12 MG: 3.12 TABLET, FILM COATED ORAL at 10:01

## 2018-01-02 RX ADMIN — OSELTAMIVIR PHOSPHATE 30 MG: 6 POWDER, FOR SUSPENSION ORAL at 03:01

## 2018-01-02 RX ADMIN — INSULIN ASPART 4 UNITS: 100 INJECTION, SOLUTION INTRAVENOUS; SUBCUTANEOUS at 06:01

## 2018-01-02 RX ADMIN — IPRATROPIUM BROMIDE AND ALBUTEROL SULFATE 3 ML: .5; 3 SOLUTION RESPIRATORY (INHALATION) at 08:01

## 2018-01-02 RX ADMIN — RIVAROXABAN 15 MG: 15 TABLET, FILM COATED ORAL at 05:01

## 2018-01-02 RX ADMIN — HYPROMELLOSE 2910 1 DROP: 5 SOLUTION OPHTHALMIC at 10:01

## 2018-01-02 RX ADMIN — PRAVASTATIN SODIUM 20 MG: 20 TABLET ORAL at 10:01

## 2018-01-02 RX ADMIN — CARVEDILOL 3.12 MG: 3.12 TABLET, FILM COATED ORAL at 08:01

## 2018-01-02 RX ADMIN — INSULIN ASPART 4 UNITS: 100 INJECTION, SOLUTION INTRAVENOUS; SUBCUTANEOUS at 01:01

## 2018-01-02 RX ADMIN — IPRATROPIUM BROMIDE AND ALBUTEROL SULFATE 3 ML: .5; 3 SOLUTION RESPIRATORY (INHALATION) at 05:01

## 2018-01-02 NOTE — NURSING
Pt with high temp overnight given IV Tylenol with some relief of temp. Pt temp now 101.6 Axillary. Pt not as responsive as earlier in the shift. Pt getting iced right now awaiting a cooling blanket for pt temp. Call placed to rapid response nursee.

## 2018-01-02 NOTE — ASSESSMENT & PLAN NOTE
Acute of on chronic hypoxemic respiratory failure due to ILD and Influenza  - Abx as above. Home O2 2-4L. Inc home prednisone 10mg to 60mg for 1mg/kg

## 2018-01-02 NOTE — NURSING
Cooling blanket applied to patient will continue to monitor. Temp on cooling blanket 65 F. Last temp on pt Axillary 100.9

## 2018-01-02 NOTE — PLAN OF CARE
Met with pt's daughter, Saumya at the bs but Nasreen is the POA  Pt has used [facility] in past  Currently at home w 24/7 sitters  Debilitated , using walkers  Possible need for home hospice vs inpatient hospice.      01/02/18 1038   Discharge Assessment   Assessment Type Discharge Planning Assessment   Confirmed/corrected address and phone number on facesheet? Yes   Assessment information obtained from? Caregiver   Expected Length of Stay (days) 4   Communicated expected length of stay with patient/caregiver yes   Prior to hospitilization cognitive status: Alert/Oriented   Prior to hospitalization functional status: Assistive Equipment;Needs Assistance   Current cognitive status: (sleeping w CPAP)   Current Functional Status: Assistive Equipment;Needs Assistance   Lives With child(reed), adult   Able to Return to Prior Arrangements no   Is patient able to care for self after discharge? No   Who are your caregiver(s) and their phone number(s)? home health [facility]- has used both   Patient's perception of discharge disposition other (comments)   Readmission Within The Last 30 Days no previous admission in last 30 days   Patient currently being followed by outpatient case management? No   Patient currently receives any other outside agency services? No   Equipment Currently Used at Home bedside commode;oxygen;walker, rolling;rollator;wheelchair   Do you have any problems affording any of your prescribed medications? No   Is the patient taking medications as prescribed? yes   Does the patient have transportation home? Yes   Discharge Plan A Hospice/home   Discharge Plan B Inpatient Hospice   Patient/Family In Agreement With Plan yes

## 2018-01-02 NOTE — H&P
[facility] Hospital Medicine  History & Physical    Patient Name: Jahaira Garcia  MRN: [id number]  Admission Date: 1/1/2018  Attending Physician: Pasquale Chin MD  Primary Care Provider: Gayathri Resendiz MD    [facility] Medicine Team: [facility] HOSP MED A Pasquale Chin MD     Patient information was obtained from relative(s) and ER records. daughter    Subjective:     Principal Problem:<principal problem not specified>    Chief Complaint:   Chief Complaint   Patient presents with    Altered Mental Status     cough/fever/ suspected UTI        HPI: Jahaira Garcia is a 89 y.o. female with PMHx IPF, HTN, HLD, Complete heart block (s/p pacemarker) DM2 and chronic steroid use who presents to [facility] ED at behest of HH nursing for acute encephalopathy. She is on 4L O2 at baseline at home (recently inc from 2.5L). Family is at bedside and  Reports increased confusion and decreased responsiveness at home as well as fevers, increased cough productive of sputum. Was not feeling well yesterday evening after being in normal health. This am family had difficulty arousing her.       Per family and pulm clinic notes (patient of Dr. Gordon) she is on 4L O2 and tachypneic at baseline. She has extensive documentation regarding goals of care and advance directives, specifically no intubation and no chest compressions. The daughter is POA with documentation in the system and not only agreed but emphasized these directives. The patient was recently treated with a short course of azithromycin for presumed bronchitis in the past week and has completed therapy.      Workup in ED was notable for a elevated lactic acid of 3.8 which is pre-fluid bolus. UA was unremarkable and CXR is largely unchanged, however WCC is Elevated. She was placed on home bipap and critical care was consulted for sepsis and hypoxic respiratory failure. And pt deemed appropriate for floor based on goals of care. Flu+    Past Medical History:    Diagnosis Date    Arthritis     Basal cell carcinoma     Complete heart block 2016    Diabetes mellitus type II     Dysphagia     GERD (gastroesophageal reflux disease)     Hiatal hernia     History of colon polyps     HTN (hypertension)     Hyperlipidemia     Lung disease, interstitial     Neuropathy     OA (osteoarthritis)     Pacemaker     [place]    Pulmonary fibrosis     PVC (premature ventricular contraction)     Steroid long-term use 11/8/2012       Past Surgical History:   Procedure Laterality Date    APPENDECTOMY      CATARACT EXTRACTION EXTRACAPSULAR W/ INTRAOCULAR LENS IMPLANTATION      X 2    CHOLECYSTECTOMY      COLONOSCOPY      COLONOSCOPY N/A 8/28/2017    Procedure: COLONOSCOPY;  Surgeon: GAGE Bear MD;  Location: [address]);  Service: Endoscopy;  Laterality: N/A;  multiple medical issues/pul htn/systolic pressure 66 June 23 2016/on continuous O2/Xarelto/OK to hold 2 days per Dr Cornelius Ann/see telephone encounter dated 8/24/17/svn    ESOPHAGOGASTRODUODENOSCOPY      INSERT / REPLACE / REMOVE PACEMAKER  06/2016    [place]    TONSILLECTOMY, ADENOIDECTOMY      TOTAL KNEE ARTHROPLASTY         Review of patient's allergies indicates:   Allergen Reactions    Tramadol      Confusion and sleeping and unsteady.       No current facility-administered medications on file prior to encounter.      Current Outpatient Prescriptions on File Prior to Encounter   Medication Sig    acetaminophen (TYLENOL) 325 MG tablet Take 1-2 tablets (325-650 mg total) by mouth nightly as needed for Pain.    albuterol (PROVENTIL) 2.5 mg /3 mL (0.083 %) nebulizer solution Take 3 mLs (2.5 mg total) by nebulization every 6 (six) hours as needed for Wheezing or Shortness of Breath. Rescue    albuterol-ipratropium 2.5mg-0.5mg/3mL (DUO-NEB) 0.5 mg-3 mg(2.5 mg base)/3 mL nebulizer solution Take 3 mLs by nebulization every evening. And every 6 hours as needed.    artificial tears (ISOPTO TEARS)  "0.5 % ophthalmic solution Place 1 drop into both eyes as needed.    BD ULTRA-FINE CHARAN PEN NEEDLES 32 gauge x 5/32" Ndle USE ONE 3 TIMES DAILY    blood sugar diagnostic (ONETOUCH VERIO) Strp USE ONE 5 TIMES DAILY    carvedilol (COREG) 3.125 MG tablet Take 1 tablet (3.125 mg total) by mouth 2 (two) times daily.    FLUZONE HIGH-DOSE 2017-18, PF, 180 mcg/0.5 mL vaccine     FOLIC ACID/MULTIVIT-MIN/LUTEIN (CENTRUM SILVER ORAL) Take 1 tablet by mouth once daily.    insulin lispro (HUMALOG KWIKPEN) 100 unit/mL InPn pen Inject 22 Units into the skin 3 (three) times daily.    levalbuterol (XOPENEX) 0.63 mg/3 mL nebulizer solution USE ONE VIAL TWICE DAILY    losartan (COZAAR) 50 MG tablet Take 1 tablet (50 mg total) by mouth 2 (two) times daily.    melatonin 1 mg Tab Take by mouth.    methyl salicylate-menthol (SALONPAS) 10-3 % SprA Apply topically.    mupirocin (BACTROBAN) 2 % ointment Apply topically 3 (three) times daily.    ONETOUCH DELICA LANCETS 33 gauge Misc USE THREE TIMES DAILY    polyethylene glycol (GLYCOLAX) 17 gram PwPk Take 17 g by mouth 3 (three) times daily as needed.    pravastatin (PRAVACHOL) 20 MG tablet TAKE ONE DAILY    predniSONE (DELTASONE) 10 MG tablet 2 tablets every AM    predniSONE (DELTASONE) 5 MG tablet     psyllium (METAMUCIL) packet Take 1 packet by mouth once daily.    rivaroxaban (XARELTO) 15 mg Tab Take 1 tablet (15 mg total) by mouth daily with dinner or evening meal.    silver sulfADIAZINE 1% (SILVADENE) 1 % cream Apply topically once daily.    [DISCONTINUED] azithromycin (ZITHROMAX Z-GI) 250 MG tablet 2 tablets now, then 1 daily till finished     Family History     Problem Relation (Age of Onset)    Tuberculosis Mother, Father        Social History Main Topics    Smoking status: Never Smoker    Smokeless tobacco: Never Used    Alcohol use No    Drug use: No    Sexual activity: No     Review of Systems   Constitutional: Positive for activity change and fever. " Negative for chills.   HENT: Positive for congestion and sore throat.    Eyes: Negative for visual disturbance.   Respiratory: Positive for cough and shortness of breath.    Cardiovascular: Negative for chest pain and leg swelling.   Gastrointestinal: Negative for abdominal pain and diarrhea.   Genitourinary: Negative for dysuria and menstrual problem.   Musculoskeletal: Positive for myalgias.   Skin: Negative for rash.   Neurological: Positive for weakness.   Psychiatric/Behavioral: Positive for confusion.     Objective:     Vital Signs (Most Recent):  Temp: (!) 101 °F (38.3 °C) (01/01/18 1143)  Pulse: 79 (01/01/18 1801)  Resp: (!) 22 (01/01/18 1801)  BP: 131/62 (01/01/18 1801)  SpO2: 98 % (01/01/18 1801) Vital Signs (24h Range):  Temp:  [101 °F (38.3 °C)] 101 °F (38.3 °C)  Pulse:  [] 79  Resp:  [22-46] 22  SpO2:  [94 %-100 %] 98 %  BP: (106-140)/(45-81) 131/62     Weight: 66.7 kg (147 lb)  Body mass index is 31.81 kg/m².    Physical Exam    Constitutional: Pt on BIPAP but follows commands and able to answer some questions. She appears well-developed and well-nourished.   HEENT: EOMI, nl conjunctiva, no LAD, neck supple  Cardiovascular: Normal rate, S1 normal and S2 normal.  Exam reveals no gallop and no friction rub.    No murmur heard.  Pulmonary/Chest: No accessory muscle usage. Tachypnea noted. No respiratory distress. She has no decreased breath sounds. She has wheezes. She has rales. Difficult exam d/t BIPAP  On bipap 10/5   Neurological: No focal deficits.   Skin: Wound LLE. Dressing appears C/D/I.     Significant Labs: All pertinent labs within the past 24 hours have been reviewed.    Significant Imaging: I have reviewed all pertinent imaging results/findings within the past 24 hours.    Assessment/Plan:     Active Diagnoses:    Diagnosis Date Noted POA    Influenza [J11.1] 01/01/2018 Yes      Problems Resolved During this Admission:    Diagnosis Date Noted Date Resolved POA     VTE Risk Mitigation          Ordered     rivaroxaban tablet 15 mg  With dinner     Route:  Oral        01/01/18 1623        Severe sepsis  T101, Hr128, Rr46, WBC16k w/ LA 3.8. Pt much imrpoved on BIPAP and tolerating. DNR. Flu+. Given IPF will cover broadly.   -tamiflu 75 bid for 5d  -vanc, cefepime. Azithro not started d/t recent completion of course. However, typical IPF exacerbation, atypical coverage warranted.     IPF exacerbation  abx as above. BIPAP prn. Home O2 4l. Inc home prednisone 10mg to 60mg for 1mg/kg. Can convert to IV if BIPAP needs to stay on. Duonebs    Dm2  Home aspart 22u w/ meals. Given levemir 12u and SSI. NPO w/ bipap. Can start on diabetic diet when able to come off BIPAP.     Paroxysmal afib  Continue xarelto, coreg 3.25 bid    Colon cancer  Dx on c-scope end of Aug and decided not to treat     HLD  statin    LLE wound  Has home health wound care. Wound care ordered here. Silvadiene ordered.     HTN  Losartan held d/t sepsis      Pasquale Chin MD  Department of Hospital Medicine   [facility]

## 2018-01-02 NOTE — HPI
Jahaira Garcia is a 89 y.o. female with PMHx IPF, HTN, HLD, Complete heart block (s/p pacemarker) DM2 and chronic steroid use who presents to [facility] ED at behest of HH nursing for acute encephalopathy. She is on 4L O2 at baseline at home (recently inc from 2.5L). Family is at bedside and  Reports increased confusion and decreased responsiveness at home as well as fevers, increased cough productive of sputum. Was not feeling well yesterday evening after being in normal health. This am family had difficulty arousing her.       Per family and pulm clinic notes (patient of Dr. Gordon) she is on 4L O2 and tachypneic at baseline. She has extensive documentation regarding goals of care and advance directives, specifically no intubation and no chest compressions. The daughter is POA with documentation in the system and not only agreed but emphasized these directives. The patient was recently treated with a short course of azithromycin for presumed bronchitis in the past week and has completed therapy.      Workup in ED was notable for a elevated lactic acid of 3.8 which is pre-fluid bolus. UA was unremarkable and CXR is largely unchanged, however WCC is Elevated. She was placed on home bipap and critical care was consulted for sepsis and hypoxic respiratory failure. And pt deemed appropriate for floor based on goals of care. Flu+

## 2018-01-02 NOTE — ASSESSMENT & PLAN NOTE
On admission: T101, Hr128, Rr46, WBC16k w/ LA 3.8. Pt much imrpoved on BIPAP and tolerating. DNR. Given IPF will cover broadly.   Suspect that her sepsis source is respiratory, pt with positive influenza A, still with fever on 1/2 AM  - started on 1/1 tamiflu for 5d  - she received broad spectrum abx: vanc, cefepime. Stop Vancomycin, 1/2, with plans to stop Cefepime soon  - with patient might have Virus-induced secondary bacterial infection, may need full course of abx  - will explore hospice option once her respiratory status is stablized home vs inpatient

## 2018-01-03 PROBLEM — S81.802A LEG WOUND, LEFT: Status: ACTIVE | Noted: 2018-01-03

## 2018-01-03 LAB
ANION GAP SERPL CALC-SCNC: 9 MMOL/L
BASOPHILS # BLD AUTO: 0.01 K/UL
BASOPHILS NFR BLD: 0.1 %
BUN SERPL-MCNC: 27 MG/DL
CALCIUM SERPL-MCNC: 7.7 MG/DL
CHLORIDE SERPL-SCNC: 105 MMOL/L
CO2 SERPL-SCNC: 22 MMOL/L
CREAT SERPL-MCNC: 1.1 MG/DL
DIFFERENTIAL METHOD: ABNORMAL
EOSINOPHIL # BLD AUTO: 0 K/UL
EOSINOPHIL NFR BLD: 0 %
ERYTHROCYTE [DISTWIDTH] IN BLOOD BY AUTOMATED COUNT: 17 %
EST. GFR  (AFRICAN AMERICAN): 51.4 ML/MIN/1.73 M^2
EST. GFR  (NON AFRICAN AMERICAN): 44.6 ML/MIN/1.73 M^2
GLUCOSE SERPL-MCNC: 217 MG/DL
HCT VFR BLD AUTO: 28.9 %
HGB BLD-MCNC: 9.5 G/DL
IMM GRANULOCYTES # BLD AUTO: 0.07 K/UL
IMM GRANULOCYTES NFR BLD AUTO: 0.8 %
LACTATE SERPL-SCNC: 1.2 MMOL/L
LYMPHOCYTES # BLD AUTO: 1.2 K/UL
LYMPHOCYTES NFR BLD: 13.8 %
MAGNESIUM SERPL-MCNC: 1.6 MG/DL
MCH RBC QN AUTO: 30.2 PG
MCHC RBC AUTO-ENTMCNC: 32.9 G/DL
MCV RBC AUTO: 92 FL
MONOCYTES # BLD AUTO: 0.6 K/UL
MONOCYTES NFR BLD: 6.8 %
NEUTROPHILS # BLD AUTO: 7.1 K/UL
NEUTROPHILS NFR BLD: 78.5 %
NRBC BLD-RTO: 0 /100 WBC
PLATELET # BLD AUTO: 210 K/UL
PMV BLD AUTO: 9.5 FL
POCT GLUCOSE: 150 MG/DL (ref 70–110)
POCT GLUCOSE: 208 MG/DL (ref 70–110)
POCT GLUCOSE: 262 MG/DL (ref 70–110)
POCT GLUCOSE: 284 MG/DL (ref 70–110)
POTASSIUM SERPL-SCNC: 4.5 MMOL/L
RBC # BLD AUTO: 3.15 M/UL
SODIUM SERPL-SCNC: 136 MMOL/L
WBC # BLD AUTO: 9 K/UL

## 2018-01-03 PROCEDURE — 94660 CPAP INITIATION&MGMT: CPT

## 2018-01-03 PROCEDURE — 94640 AIRWAY INHALATION TREATMENT: CPT

## 2018-01-03 PROCEDURE — 99900035 HC TECH TIME PER 15 MIN (STAT)

## 2018-01-03 PROCEDURE — 25000003 PHARM REV CODE 250: Performed by: HOSPITALIST

## 2018-01-03 PROCEDURE — 94761 N-INVAS EAR/PLS OXIMETRY MLT: CPT

## 2018-01-03 PROCEDURE — 63600175 PHARM REV CODE 636 W HCPCS: Performed by: HOSPITALIST

## 2018-01-03 PROCEDURE — 25000242 PHARM REV CODE 250 ALT 637 W/ HCPCS: Performed by: HOSPITALIST

## 2018-01-03 PROCEDURE — 94664 DEMO&/EVAL PT USE INHALER: CPT

## 2018-01-03 PROCEDURE — 80048 BASIC METABOLIC PNL TOTAL CA: CPT

## 2018-01-03 PROCEDURE — 27000221 HC OXYGEN, UP TO 24 HOURS

## 2018-01-03 PROCEDURE — 36415 COLL VENOUS BLD VENIPUNCTURE: CPT

## 2018-01-03 PROCEDURE — 99233 SBSQ HOSP IP/OBS HIGH 50: CPT | Mod: ,,, | Performed by: HOSPITALIST

## 2018-01-03 PROCEDURE — 83735 ASSAY OF MAGNESIUM: CPT

## 2018-01-03 PROCEDURE — 83605 ASSAY OF LACTIC ACID: CPT

## 2018-01-03 PROCEDURE — 11000001 HC ACUTE MED/SURG PRIVATE ROOM

## 2018-01-03 PROCEDURE — 94668 MNPJ CHEST WALL SBSQ: CPT

## 2018-01-03 PROCEDURE — 85025 COMPLETE CBC W/AUTO DIFF WBC: CPT

## 2018-01-03 RX ORDER — PREDNISONE 20 MG/1
40 TABLET ORAL DAILY
Status: DISCONTINUED | OUTPATIENT
Start: 2018-01-04 | End: 2018-01-04

## 2018-01-03 RX ORDER — MAGNESIUM SULFATE HEPTAHYDRATE 40 MG/ML
2 INJECTION, SOLUTION INTRAVENOUS ONCE
Status: COMPLETED | OUTPATIENT
Start: 2018-01-03 | End: 2018-01-03

## 2018-01-03 RX ORDER — INSULIN ASPART 100 [IU]/ML
4 INJECTION, SOLUTION INTRAVENOUS; SUBCUTANEOUS
Status: DISCONTINUED | OUTPATIENT
Start: 2018-01-03 | End: 2018-01-05 | Stop reason: HOSPADM

## 2018-01-03 RX ORDER — LOSARTAN POTASSIUM 25 MG/1
25 TABLET ORAL 2 TIMES DAILY
Status: DISCONTINUED | OUTPATIENT
Start: 2018-01-03 | End: 2018-01-04

## 2018-01-03 RX ORDER — MOXIFLOXACIN HYDROCHLORIDE 400 MG/1
400 TABLET ORAL DAILY
Status: DISCONTINUED | OUTPATIENT
Start: 2018-01-04 | End: 2018-01-04

## 2018-01-03 RX ADMIN — CARVEDILOL 3.12 MG: 3.12 TABLET, FILM COATED ORAL at 09:01

## 2018-01-03 RX ADMIN — RIVAROXABAN 15 MG: 15 TABLET, FILM COATED ORAL at 05:01

## 2018-01-03 RX ADMIN — GUAIFENESIN 200 MG: 200 SOLUTION ORAL at 09:01

## 2018-01-03 RX ADMIN — CEFEPIME HYDROCHLORIDE 2 G: 2 INJECTION, POWDER, FOR SOLUTION INTRAVENOUS at 11:01

## 2018-01-03 RX ADMIN — GUAIFENESIN 200 MG: 200 SOLUTION ORAL at 05:01

## 2018-01-03 RX ADMIN — INSULIN ASPART 4 UNITS: 100 INJECTION, SOLUTION INTRAVENOUS; SUBCUTANEOUS at 06:01

## 2018-01-03 RX ADMIN — IPRATROPIUM BROMIDE AND ALBUTEROL SULFATE 3 ML: .5; 3 SOLUTION RESPIRATORY (INHALATION) at 06:01

## 2018-01-03 RX ADMIN — INSULIN ASPART 4 UNITS: 100 INJECTION, SOLUTION INTRAVENOUS; SUBCUTANEOUS at 12:01

## 2018-01-03 RX ADMIN — PREDNISONE 60 MG: 20 TABLET ORAL at 09:01

## 2018-01-03 RX ADMIN — MAGNESIUM SULFATE IN WATER 2 G: 40 INJECTION, SOLUTION INTRAVENOUS at 09:01

## 2018-01-03 RX ADMIN — IPRATROPIUM BROMIDE AND ALBUTEROL SULFATE 3 ML: .5; 3 SOLUTION RESPIRATORY (INHALATION) at 01:01

## 2018-01-03 RX ADMIN — PRAVASTATIN SODIUM 20 MG: 20 TABLET ORAL at 09:01

## 2018-01-03 RX ADMIN — SILVER SULFADIAZINE: 10 CREAM TOPICAL at 09:01

## 2018-01-03 RX ADMIN — OSELTAMIVIR PHOSPHATE 30 MG: 6 POWDER, FOR SUSPENSION ORAL at 11:01

## 2018-01-03 RX ADMIN — IPRATROPIUM BROMIDE AND ALBUTEROL SULFATE 3 ML: .5; 3 SOLUTION RESPIRATORY (INHALATION) at 12:01

## 2018-01-03 RX ADMIN — INSULIN ASPART 3 UNITS: 100 INJECTION, SOLUTION INTRAVENOUS; SUBCUTANEOUS at 09:01

## 2018-01-03 RX ADMIN — INSULIN ASPART 4 UNITS: 100 INJECTION, SOLUTION INTRAVENOUS; SUBCUTANEOUS at 09:01

## 2018-01-03 RX ADMIN — HYPROMELLOSE 2910 1 DROP: 5 SOLUTION OPHTHALMIC at 09:01

## 2018-01-03 RX ADMIN — LOSARTAN POTASSIUM 25 MG: 25 TABLET, FILM COATED ORAL at 09:01

## 2018-01-03 RX ADMIN — GUAIFENESIN 200 MG: 200 SOLUTION ORAL at 11:01

## 2018-01-03 RX ADMIN — GUAIFENESIN 200 MG: 200 SOLUTION ORAL at 02:01

## 2018-01-03 RX ADMIN — INSULIN ASPART 6 UNITS: 100 INJECTION, SOLUTION INTRAVENOUS; SUBCUTANEOUS at 06:01

## 2018-01-03 RX ADMIN — INSULIN DETEMIR 15 UNITS: 100 INJECTION, SOLUTION SUBCUTANEOUS at 09:01

## 2018-01-03 RX ADMIN — IPRATROPIUM BROMIDE AND ALBUTEROL SULFATE 3 ML: .5; 3 SOLUTION RESPIRATORY (INHALATION) at 08:01

## 2018-01-03 NOTE — PROGRESS NOTES
Wound care consult received for LLE wound.  Pt presents with sitter at bedside.  BLE dry flaky skin.  No redness or edema. Partial thickness wound to left lateral leg.  Applied Mepilex border and recommend Sween moisturizer to BLE. [id number]       01/03/18 1122       Wound 01/02/18 1032 Abrasion(s) lower leg   Date First Assessed/Time First Assessed: 01/02/18 1032   Pre-existing: Yes  Wound Type: Abrasion(s)  Side: Left  Orientation: lower  Location: leg   Wound Image    Wound WDL ex   Dressing Appearance intact   Drainage Amount scant   Drainage Characteristics/Odor serosanguineous   Wound Base pink;moist   Periwound Area dry   Wound Edges intact   Wound Length (cm) 0.3   Wound Width (cm) 0.2   Depth (cm) 0.01   Non-staged Wound Description Partial thickness   Cleansed W/ sterile normal saline   Dressing other (see comments)  (mepilex border)   Dressing Change Due 01/06/18

## 2018-01-03 NOTE — HOSPITAL COURSE
1/2/2018: Admitted for respiratory failure suspected due to PNA vs flu. +Flu test in ED, started on Tamiflu. Pt required BiPAP overnight, which was stop this AM. Pt with still with fever this early AM. Overall she appears fatigue, family at bedside, family is interested in hospice given her deterioration. I explain that we will treat her current symptoms and monitor. Pt may qualify for home hospice if her respiratory status improves. BIPAP QHS. OLIVIA on admission with some improvement    1/3/2018: improvement of respiratory status at this time, overall looks more comfortable, pt main complain is the cough. Afebrile. Hyperglycemia due to steroid. Will need to be on Tamiflu for 10 days total. Updated the daughter. Plan is for patient to be discharge home when stable, she has sitter. May transition to hospice at a later time (AIM?) daughter is in agreement with this plan. PT/OT eval for needs at home.      1/4/2018 This AM on evaluation, report shortness of breath, still with productive cough. She was on 4L NC at that time. I ordered more breathing treatment. At ~12pm, pt was getting a breathing treatment and went unresponsive at this time. CORE was call, pt was mildly arousal to sternal rub but did not follow command. She was placed on DNR, ABG was done that showed no sign of Hypercapnia or Hypoxia. Sat 97-99% on BiPAP. Lungs are still very tight, no wheezing noted. CXR done showed no drastic changes ie pnx, pleural effusion that might cause this. Glucose was WNL. Daughter at bedside, I had a long conversation with her, we will respect her wishes and will not purse aggressive measure like intubation. I suspect that she might have aspirated mucus (nursing and daughter report coughing during her neb treatment when this happen) during the breathing treatment leading to this respiratory failure given the fact that throughout this hospital stay we were battling her cough and secretions.     1/5: Pt was started PRN morphine  and PRN ativan overnight which helped with her respiratory status. She is much more awake today, she is eat all her meals. Still tachypnic on 4L NC. Initial discussion was for inpatient hospice but family contacted pt's pulmonologist. Dr Gordon who visited the patient.  From my conversation with him, he wants the patient to be discharge home with her usual medications. I explanied that yesterday she had a deterioration and that today she is doing better. He did not think inpatient hospice was needed. I discussed with patient POA, they are ok with taking her home today. D/C home with  AIM program.  PO steroid at 10mg per Dr Gordon recommendation, finish out tamiflu 2 days left, Pt POA request to finish out a course of abx, moxifloxacin. All issues were address with the patient POA, she has no needs at this time. Pt to be transport home via ambulance

## 2018-01-03 NOTE — SUBJECTIVE & OBJECTIVE
Interval History: See above. Wean down to 4L of Nasal canula    Review of Systems   Constitutional: Positive for activity change and fever. Negative for chills.   Eyes: Negative for visual disturbance.   Respiratory: Positive for cough and shortness of breath.    Cardiovascular: Negative for chest pain and leg swelling.   Gastrointestinal: Negative for abdominal pain and diarrhea.   Genitourinary: Negative for dysuria and menstrual problem.   Musculoskeletal: Positive for myalgias.   Skin: Negative for rash.   Neurological: Positive for weakness.        Objective:     Vital Signs (Most Recent):  Temp: 97.6 °F (36.4 °C) (01/02/18 1611)  Pulse: 78 (01/02/18 1710)  Resp: (!) 22 (01/02/18 1710)  BP: (!) 145/63 (01/02/18 1611)  SpO2: (!) 93 % (01/02/18 1710) Vital Signs (24h Range):  Temp:  [97.6 °F (36.4 °C)-103 °F (39.4 °C)] 97.6 °F (36.4 °C)  Pulse:  [] 78  Resp:  [18-28] 22  SpO2:  [91 %-100 %] 93 %  BP: (119-145)/(55-65) 145/63     Weight: 66.7 kg (147 lb)  Body mass index is 31.81 kg/m².    Intake/Output Summary (Last 24 hours) at 01/02/18 1826  Last data filed at 01/02/18 1730   Gross per 24 hour   Intake              450 ml   Output                0 ml   Net              450 ml      Physical Exam  Constitutional: follows commands and able to answer some questions. She appears well-developed and well-nourished. Frailed Female  HEENT: EOMI, nl conjunctiva, no LAD, neck supple  Cardiovascular: Normal rate, S1 normal and S2 normal.  Exam reveals no gallop and no friction rub.    No murmur heard.  Pulmonary/Chest: No accessory muscle usage. Tachypnea noted. No respiratory distress. She has no decreased breath sounds. She has wheezes. She has rales.   Neurological: No focal deficits.   Skin: Wound LLE. Dressing appears C/D/I.       MELD-Na score: 10 at 1/2/2018  3:56 AM  MELD score: 10 at 1/2/2018  3:56 AM  Calculated from:  Serum Creatinine: 1.5 mg/dL at 1/2/2018  3:56 AM  Serum Sodium: 135 mmol/L at 1/2/2018   3:56 AM  Total Bilirubin: 0.8 mg/dL (Rounded to 1) at 1/1/2018 10:16 AM  INR(ratio): 1.0 at 1/1/2018 10:16 AM  Age: 89 years    Significant Labs:  CBC:    Recent Labs  Lab 01/01/18  1016 01/02/18  0356   WBC 16.24* 11.71   HGB 12.3 11.4*   HCT 38.1 34.9*    218     CMP:    Recent Labs  Lab 01/01/18  1016 01/02/18  0356   * 135*   K 5.2* 4.5   CL 97 104   CO2 21* 20*   * 160*   BUN 32* 31*   CREATININE 2.0* 1.5*   CALCIUM 9.2 8.0*   PROT 6.7  --    ALBUMIN 3.0*  --    BILITOT 0.8  --    ALKPHOS 49*  --    AST 42*  --    ALT 25  --    ANIONGAP 15 11   EGFRNONAA 21.7* 30.7*     PTINR:    Recent Labs  Lab 01/01/18  1016   INR 1.0

## 2018-01-03 NOTE — PROGRESS NOTES
[facility] Hospital Medicine  Progress Note    Patient Name: Jahaira Garcia  MRN: [id number]  Patient Class: IP- Inpatient   Admission Date: 1/1/2018  Length of Stay: 1 days  Attending Physician: James Jin MD  Primary Care Provider: Gayathri Resendiz MD    [facility] Medicine Team: [facility]OSP MED A James Jin MD    Subjective:     Principal Problem:Acute on chronic respiratory failure with hypoxia    HPI:  Jahaira Garcia is a 89 y.o. female with PMHx IPF, HTN, HLD, Complete heart block (s/p pacemarker) DM2 and chronic steroid use who presents to [facility] ED at behest of HH nursing for acute encephalopathy. She is on 4L O2 at baseline at home (recently inc from 2.5L). Family is at bedside and  Reports increased confusion and decreased responsiveness at home as well as fevers, increased cough productive of sputum. Was not feeling well yesterday evening after being in normal health. This am family had difficulty arousing her.       Per family and pulm clinic notes (patient of Dr. Gordon) she is on 4L O2 and tachypneic at baseline. She has extensive documentation regarding goals of care and advance directives, specifically no intubation and no chest compressions. The daughter is POA with documentation in the system and not only agreed but emphasized these directives. The patient was recently treated with a short course of azithromycin for presumed bronchitis in the past week and has completed therapy.      Workup in ED was notable for a elevated lactic acid of 3.8 which is pre-fluid bolus. UA was unremarkable and CXR is largely unchanged, however WCC is Elevated. She was placed on home bipap and critical care was consulted for sepsis and hypoxic respiratory failure. And pt deemed appropriate for floor based on goals of care. Flu+       Hospital Course:  1/2/2018: Admitted for respiratory failure suspected due to PNA vs flu. +Flu test in ED, started on Tamiflu. Pt required BiPAP overnight,  which was stop this AM. Pt with still with fever this early AM. Overall she appears fatigue, family at bedside, family is interested in hospice given her deterioration. I explain that we will treat her current symptoms and monitor. Pt may qualify for home hospice if her respiratory status improves. BIPAP QHS. OLIVIA on admission with some improvement      Interval History: See above. Wean down to 4L of Nasal canula    Review of Systems   Constitutional: Positive for activity change and fever. Negative for chills.   Eyes: Negative for visual disturbance.   Respiratory: Positive for cough and shortness of breath.    Cardiovascular: Negative for chest pain and leg swelling.   Gastrointestinal: Negative for abdominal pain and diarrhea.   Genitourinary: Negative for dysuria and menstrual problem.   Musculoskeletal: Positive for myalgias.   Skin: Negative for rash.   Neurological: Positive for weakness.        Objective:     Vital Signs (Most Recent):  Temp: 97.6 °F (36.4 °C) (01/02/18 1611)  Pulse: 78 (01/02/18 1710)  Resp: (!) 22 (01/02/18 1710)  BP: (!) 145/63 (01/02/18 1611)  SpO2: (!) 93 % (01/02/18 1710) Vital Signs (24h Range):  Temp:  [97.6 °F (36.4 °C)-103 °F (39.4 °C)] 97.6 °F (36.4 °C)  Pulse:  [] 78  Resp:  [18-28] 22  SpO2:  [91 %-100 %] 93 %  BP: (119-145)/(55-65) 145/63     Weight: 66.7 kg (147 lb)  Body mass index is 31.81 kg/m².    Intake/Output Summary (Last 24 hours) at 01/02/18 1826  Last data filed at 01/02/18 1730   Gross per 24 hour   Intake              450 ml   Output                0 ml   Net              450 ml      Physical Exam  Constitutional: follows commands and able to answer some questions. She appears well-developed and well-nourished. Frailed Female  HEENT: EOMI, nl conjunctiva, no LAD, neck supple  Cardiovascular: Normal rate, S1 normal and S2 normal.  Exam reveals no gallop and no friction rub.    No murmur heard.  Pulmonary/Chest: No accessory muscle usage. Tachypnea noted. No  respiratory distress. She has no decreased breath sounds. She has wheezes. She has rales.   Neurological: No focal deficits.   Skin: Wound LLE. Dressing appears C/D/I.       MELD-Na score: 10 at 1/2/2018  3:56 AM  MELD score: 10 at 1/2/2018  3:56 AM  Calculated from:  Serum Creatinine: 1.5 mg/dL at 1/2/2018  3:56 AM  Serum Sodium: 135 mmol/L at 1/2/2018  3:56 AM  Total Bilirubin: 0.8 mg/dL (Rounded to 1) at 1/1/2018 10:16 AM  INR(ratio): 1.0 at 1/1/2018 10:16 AM  Age: 89 years    Significant Labs:  CBC:    Recent Labs  Lab 01/01/18  1016 01/02/18  0356   WBC 16.24* 11.71   HGB 12.3 11.4*   HCT 38.1 34.9*    218     CMP:    Recent Labs  Lab 01/01/18  1016 01/02/18  0356   * 135*   K 5.2* 4.5   CL 97 104   CO2 21* 20*   * 160*   BUN 32* 31*   CREATININE 2.0* 1.5*   CALCIUM 9.2 8.0*   PROT 6.7  --    ALBUMIN 3.0*  --    BILITOT 0.8  --    ALKPHOS 49*  --    AST 42*  --    ALT 25  --    ANIONGAP 15 11   EGFRNONAA 21.7* 30.7*     PTINR:    Recent Labs  Lab 01/01/18  1016   INR 1.0          Assessment/Plan:      * Acute on chronic respiratory failure with hypoxia    - see above  - will consider hospice inpatient vs outpatient  - pt is DNR/DNI, ok with BIPAP          Severe sepsis    On admission: T101, Hr128, Rr46, WBC16k w/ LA 3.8. Pt much imrpoved on BIPAP and tolerating. DNR. Given IPF will cover broadly.   Suspect that her sepsis source is respiratory, pt with positive influenza A, still with fever on 1/2 AM  - started on 1/1 tamiflu for 5d  - she received broad spectrum abx: vanc, cefepime. Stop Vancomycin, 1/2, with plans to stop Cefepime soon  - with patient might have Virus-induced secondary bacterial infection, may need full course of abx  - will explore hospice option once her respiratory status is stablized home vs inpatient          Influenza A    - Tested + in ED for Influenza A  - Start Tamiflu  - see above          ILD (interstitial lung disease)    Acute of on chronic hypoxemic  respiratory failure due to ILD and Influenza  - Abx as above. Home O2 2-4L. Inc home prednisone 10mg to 60mg for 1mg/kg            Acute renal failure superimposed on stage 3 chronic kidney disease    Cr baseline 1.3, on admit was 2  Some improvement at this time, hold Losartan  CTM          Wound of left leg    Has home health wound care. Wound care ordered here. Silvadiene ordered.           Paroxysmal atrial fibrillation    Continue xarelto, coreg 3.25 bid          Essential hypertension    Losartan held d/t sepsis          Uncontrolled type 2 diabetes mellitus with stage 4 chronic kidney disease, with long-term current use of insulin    Given levemir 12u and SSI.  Start on diet            VTE Risk Mitigation         Ordered     rivaroxaban tablet 15 mg  With dinner     Route:  Oral        01/01/18 6110              James Jin MD  Department of Hospital Medicine   [facility]

## 2018-01-03 NOTE — PLAN OF CARE
Problem: Patient Care Overview  Goal: Plan of Care Review  Outcome: Ongoing (interventions implemented as appropriate)  Pt is free from injury and falls during shift. Pt shows no signs of acute distress. Pt denies pain. AAO. Vitals stable. Blood sugar checked per orders with SSI given as needed. Pt repositioned often- skin intact. Incontinent care provided as needed. Family at bedside. Bed is in low position with breaks locked. Side rails are up x 2. Environment is clutter free. Call light is within reach of the patient. Will continue to monitor.

## 2018-01-04 LAB
ALBUMIN SERPL BCP-MCNC: 2.4 G/DL
ALLENS TEST: ABNORMAL
ALP SERPL-CCNC: 36 U/L
ALT SERPL W/O P-5'-P-CCNC: 34 U/L
ANION GAP SERPL CALC-SCNC: 6 MMOL/L
ANION GAP SERPL CALC-SCNC: 7 MMOL/L
AST SERPL-CCNC: 57 U/L
BASOPHILS # BLD AUTO: 0.01 K/UL
BASOPHILS # BLD AUTO: 0.01 K/UL
BASOPHILS NFR BLD: 0.1 %
BASOPHILS NFR BLD: 0.1 %
BILIRUB SERPL-MCNC: 0.5 MG/DL
BUN SERPL-MCNC: 26 MG/DL
BUN SERPL-MCNC: 26 MG/DL
CALCIUM SERPL-MCNC: 7.3 MG/DL
CALCIUM SERPL-MCNC: 8.3 MG/DL
CHLORIDE SERPL-SCNC: 105 MMOL/L
CHLORIDE SERPL-SCNC: 105 MMOL/L
CO2 SERPL-SCNC: 23 MMOL/L
CO2 SERPL-SCNC: 24 MMOL/L
CREAT SERPL-MCNC: 0.9 MG/DL
CREAT SERPL-MCNC: 0.9 MG/DL
DELSYS: ABNORMAL
DIFFERENTIAL METHOD: ABNORMAL
DIFFERENTIAL METHOD: ABNORMAL
EOSINOPHIL # BLD AUTO: 0 K/UL
EOSINOPHIL # BLD AUTO: 0 K/UL
EOSINOPHIL NFR BLD: 0 %
EOSINOPHIL NFR BLD: 0 %
EP: 5
ERYTHROCYTE [DISTWIDTH] IN BLOOD BY AUTOMATED COUNT: 16.7 %
ERYTHROCYTE [DISTWIDTH] IN BLOOD BY AUTOMATED COUNT: 16.8 %
EST. GFR  (AFRICAN AMERICAN): >60 ML/MIN/1.73 M^2
EST. GFR  (AFRICAN AMERICAN): >60 ML/MIN/1.73 M^2
EST. GFR  (NON AFRICAN AMERICAN): 56.9 ML/MIN/1.73 M^2
EST. GFR  (NON AFRICAN AMERICAN): 56.9 ML/MIN/1.73 M^2
GLUCOSE SERPL-MCNC: 152 MG/DL
GLUCOSE SERPL-MCNC: 171 MG/DL
HCO3 UR-SCNC: 23.9 MMOL/L (ref 24–28)
HCT VFR BLD AUTO: 30.1 %
HCT VFR BLD AUTO: 31 %
HGB BLD-MCNC: 10 G/DL
HGB BLD-MCNC: 10.1 G/DL
IMM GRANULOCYTES # BLD AUTO: 0.05 K/UL
IMM GRANULOCYTES # BLD AUTO: 0.08 K/UL
IMM GRANULOCYTES NFR BLD AUTO: 0.7 %
IMM GRANULOCYTES NFR BLD AUTO: 0.7 %
IP: 13
LACTATE SERPL-SCNC: 1.2 MMOL/L
LYMPHOCYTES # BLD AUTO: 0.8 K/UL
LYMPHOCYTES # BLD AUTO: 1.1 K/UL
LYMPHOCYTES NFR BLD: 15.1 %
LYMPHOCYTES NFR BLD: 7.1 %
MAGNESIUM SERPL-MCNC: 1.8 MG/DL
MAGNESIUM SERPL-MCNC: 2.2 MG/DL
MCH RBC QN AUTO: 30.8 PG
MCH RBC QN AUTO: 31.1 PG
MCHC RBC AUTO-ENTMCNC: 32.6 G/DL
MCHC RBC AUTO-ENTMCNC: 33.2 G/DL
MCV RBC AUTO: 93 FL
MCV RBC AUTO: 95 FL
MODE: ABNORMAL
MONOCYTES # BLD AUTO: 0.5 K/UL
MONOCYTES # BLD AUTO: 0.6 K/UL
MONOCYTES NFR BLD: 4.8 %
MONOCYTES NFR BLD: 6.3 %
NEUTROPHILS # BLD AUTO: 10.2 K/UL
NEUTROPHILS # BLD AUTO: 5.8 K/UL
NEUTROPHILS NFR BLD: 77.8 %
NEUTROPHILS NFR BLD: 87.3 %
NRBC BLD-RTO: 0 /100 WBC
NRBC BLD-RTO: 0 /100 WBC
PCO2 BLDA: 43.1 MMHG (ref 35–45)
PH SMN: 7.35 [PH] (ref 7.35–7.45)
PHOSPHATE SERPL-MCNC: 1.8 MG/DL
PLATELET # BLD AUTO: 229 K/UL
PLATELET # BLD AUTO: 238 K/UL
PMV BLD AUTO: 9.1 FL
PMV BLD AUTO: 9.3 FL
PO2 BLDA: 99 MMHG (ref 80–100)
POC BE: -2 MMOL/L
POC SATURATED O2: 97 % (ref 95–100)
POC TCO2: 25 MMOL/L (ref 23–27)
POCT GLUCOSE: 146 MG/DL (ref 70–110)
POCT GLUCOSE: 182 MG/DL (ref 70–110)
POCT GLUCOSE: 224 MG/DL (ref 70–110)
POTASSIUM SERPL-SCNC: 4.4 MMOL/L
POTASSIUM SERPL-SCNC: 4.6 MMOL/L
PROT SERPL-MCNC: 5.7 G/DL
RBC # BLD AUTO: 3.25 M/UL
RBC # BLD AUTO: 3.25 M/UL
SAMPLE: ABNORMAL
SITE: ABNORMAL
SODIUM SERPL-SCNC: 135 MMOL/L
SODIUM SERPL-SCNC: 135 MMOL/L
TROPONIN I SERPL DL<=0.01 NG/ML-MCNC: 0.06 NG/ML
WBC # BLD AUTO: 11.7 K/UL
WBC # BLD AUTO: 7.42 K/UL

## 2018-01-04 PROCEDURE — 94640 AIRWAY INHALATION TREATMENT: CPT

## 2018-01-04 PROCEDURE — 27000221 HC OXYGEN, UP TO 24 HOURS

## 2018-01-04 PROCEDURE — 11000001 HC ACUTE MED/SURG PRIVATE ROOM

## 2018-01-04 PROCEDURE — G8980 MOBILITY D/C STATUS: HCPCS | Mod: CM

## 2018-01-04 PROCEDURE — 25000242 PHARM REV CODE 250 ALT 637 W/ HCPCS: Performed by: HOSPITALIST

## 2018-01-04 PROCEDURE — 84100 ASSAY OF PHOSPHORUS: CPT

## 2018-01-04 PROCEDURE — 84484 ASSAY OF TROPONIN QUANT: CPT

## 2018-01-04 PROCEDURE — 25000003 PHARM REV CODE 250: Performed by: HOSPITALIST

## 2018-01-04 PROCEDURE — 25000003 PHARM REV CODE 250: Performed by: PHYSICIAN ASSISTANT

## 2018-01-04 PROCEDURE — 97165 OT EVAL LOW COMPLEX 30 MIN: CPT

## 2018-01-04 PROCEDURE — 85025 COMPLETE CBC W/AUTO DIFF WBC: CPT

## 2018-01-04 PROCEDURE — 80048 BASIC METABOLIC PNL TOTAL CA: CPT

## 2018-01-04 PROCEDURE — G8978 MOBILITY CURRENT STATUS: HCPCS | Mod: CM

## 2018-01-04 PROCEDURE — 36415 COLL VENOUS BLD VENIPUNCTURE: CPT

## 2018-01-04 PROCEDURE — 94761 N-INVAS EAR/PLS OXIMETRY MLT: CPT

## 2018-01-04 PROCEDURE — 99233 SBSQ HOSP IP/OBS HIGH 50: CPT | Mod: ,,, | Performed by: HOSPITALIST

## 2018-01-04 PROCEDURE — 83735 ASSAY OF MAGNESIUM: CPT | Mod: 91

## 2018-01-04 PROCEDURE — 63600175 PHARM REV CODE 636 W HCPCS: Performed by: HOSPITALIST

## 2018-01-04 PROCEDURE — 83735 ASSAY OF MAGNESIUM: CPT

## 2018-01-04 PROCEDURE — 93005 ELECTROCARDIOGRAM TRACING: CPT

## 2018-01-04 PROCEDURE — G8987 SELF CARE CURRENT STATUS: HCPCS | Mod: CN

## 2018-01-04 PROCEDURE — 83605 ASSAY OF LACTIC ACID: CPT

## 2018-01-04 PROCEDURE — 93010 ELECTROCARDIOGRAM REPORT: CPT | Mod: ,,, | Performed by: INTERNAL MEDICINE

## 2018-01-04 PROCEDURE — 94668 MNPJ CHEST WALL SBSQ: CPT

## 2018-01-04 PROCEDURE — 80053 COMPREHEN METABOLIC PANEL: CPT

## 2018-01-04 PROCEDURE — G8988 SELF CARE GOAL STATUS: HCPCS | Mod: CL

## 2018-01-04 PROCEDURE — G8979 MOBILITY GOAL STATUS: HCPCS | Mod: CM

## 2018-01-04 PROCEDURE — 97163 PT EVAL HIGH COMPLEX 45 MIN: CPT

## 2018-01-04 RX ORDER — OSELTAMIVIR PHOSPHATE 75 MG/1
75 CAPSULE ORAL 2 TIMES DAILY
Qty: 10 CAPSULE | Refills: 0
Start: 2018-01-04 | End: 2018-01-05 | Stop reason: HOSPADM

## 2018-01-04 RX ORDER — MORPHINE SULFATE 2 MG/ML
1 INJECTION, SOLUTION INTRAMUSCULAR; INTRAVENOUS EVERY 4 HOURS PRN
Status: DISCONTINUED | OUTPATIENT
Start: 2018-01-04 | End: 2018-01-05

## 2018-01-04 RX ORDER — HYDRALAZINE HYDROCHLORIDE 25 MG/1
50 TABLET, FILM COATED ORAL EVERY 6 HOURS PRN
Status: DISCONTINUED | OUTPATIENT
Start: 2018-01-04 | End: 2018-01-04

## 2018-01-04 RX ORDER — MOXIFLOXACIN HYDROCHLORIDE 400 MG/1
400 TABLET ORAL DAILY
Qty: 5 TABLET | Refills: 0
Start: 2018-01-05 | End: 2018-01-05 | Stop reason: HOSPADM

## 2018-01-04 RX ORDER — SCOLOPAMINE TRANSDERMAL SYSTEM 1 MG/1
1 PATCH, EXTENDED RELEASE TRANSDERMAL
Status: DISCONTINUED | OUTPATIENT
Start: 2018-01-04 | End: 2018-01-05 | Stop reason: HOSPADM

## 2018-01-04 RX ORDER — IPRATROPIUM BROMIDE AND ALBUTEROL SULFATE 2.5; .5 MG/3ML; MG/3ML
3 SOLUTION RESPIRATORY (INHALATION) EVERY 4 HOURS
Status: COMPLETED | OUTPATIENT
Start: 2018-01-04 | End: 2018-01-05

## 2018-01-04 RX ORDER — LORAZEPAM 0.5 MG/1
1 TABLET ORAL EVERY 30 MIN PRN
Status: DISCONTINUED | OUTPATIENT
Start: 2018-01-04 | End: 2018-01-05

## 2018-01-04 RX ORDER — OSELTAMIVIR PHOSPHATE 75 MG/1
75 CAPSULE ORAL 2 TIMES DAILY
Status: DISCONTINUED | OUTPATIENT
Start: 2018-01-04 | End: 2018-01-04

## 2018-01-04 RX ORDER — ONDANSETRON 2 MG/ML
8 INJECTION INTRAMUSCULAR; INTRAVENOUS EVERY 6 HOURS PRN
Status: DISCONTINUED | OUTPATIENT
Start: 2018-01-04 | End: 2018-01-05 | Stop reason: HOSPADM

## 2018-01-04 RX ADMIN — MORPHINE SULFATE 1 MG: 2 INJECTION, SOLUTION INTRAMUSCULAR; INTRAVENOUS at 08:01

## 2018-01-04 RX ADMIN — MOXIFLOXACIN HYDROCHLORIDE 400 MG: 400 TABLET, FILM COATED ORAL at 09:01

## 2018-01-04 RX ADMIN — INSULIN ASPART 4 UNITS: 100 INJECTION, SOLUTION INTRAVENOUS; SUBCUTANEOUS at 06:01

## 2018-01-04 RX ADMIN — PRAVASTATIN SODIUM 20 MG: 20 TABLET ORAL at 09:01

## 2018-01-04 RX ADMIN — IPRATROPIUM BROMIDE AND ALBUTEROL SULFATE 3 ML: .5; 3 SOLUTION RESPIRATORY (INHALATION) at 03:01

## 2018-01-04 RX ADMIN — LOSARTAN POTASSIUM 25 MG: 25 TABLET, FILM COATED ORAL at 09:01

## 2018-01-04 RX ADMIN — SILVER SULFADIAZINE: 10 CREAM TOPICAL at 09:01

## 2018-01-04 RX ADMIN — CARVEDILOL 3.12 MG: 3.12 TABLET, FILM COATED ORAL at 09:01

## 2018-01-04 RX ADMIN — INSULIN DETEMIR 15 UNITS: 100 INJECTION, SOLUTION SUBCUTANEOUS at 08:01

## 2018-01-04 RX ADMIN — INSULIN ASPART 2 UNITS: 100 INJECTION, SOLUTION INTRAVENOUS; SUBCUTANEOUS at 08:01

## 2018-01-04 RX ADMIN — IPRATROPIUM BROMIDE AND ALBUTEROL SULFATE 3 ML: .5; 3 SOLUTION RESPIRATORY (INHALATION) at 12:01

## 2018-01-04 RX ADMIN — IPRATROPIUM BROMIDE AND ALBUTEROL SULFATE 3 ML: .5; 3 SOLUTION RESPIRATORY (INHALATION) at 08:01

## 2018-01-04 RX ADMIN — HYDRALAZINE HYDROCHLORIDE 50 MG: 25 TABLET, FILM COATED ORAL at 04:01

## 2018-01-04 RX ADMIN — PREDNISONE 40 MG: 20 TABLET ORAL at 09:01

## 2018-01-04 RX ADMIN — INSULIN ASPART 4 UNITS: 100 INJECTION, SOLUTION INTRAVENOUS; SUBCUTANEOUS at 09:01

## 2018-01-04 RX ADMIN — SCOPALAMINE 1 PATCH: 1 PATCH, EXTENDED RELEASE TRANSDERMAL at 06:01

## 2018-01-04 RX ADMIN — HYPROMELLOSE 2910 1 DROP: 5 SOLUTION OPHTHALMIC at 09:01

## 2018-01-04 RX ADMIN — GUAIFENESIN 200 MG: 200 SOLUTION ORAL at 04:01

## 2018-01-04 RX ADMIN — LORAZEPAM 1 MG: 0.5 TABLET ORAL at 06:01

## 2018-01-04 NOTE — ASSESSMENT & PLAN NOTE
- Hx of ILD who was admitted for influenza, on home oxygen 2-4L,     This AM on evaluation, report shortness of breath, still with productive cough. She was on 4L NC at that time. I ordered more breathing treatment. At ~12pm, pt was getting a breathing treatment and went unresponsive at this time. CORE was call, pt was mildly arousal to sternal rub but did not follow command. She was placed on DNR, ABG was done that showed no sign of Hypercapnia or Hypoxia. Sat 97-99% on BiPAP. Lungs are still very tight, no wheezing noted. CXR done showed no drastic changes ie pnx, pleural effusion that might cause this. Daughter at bedside, I had a long conversation with her, we will respect her wishes and will not purse aggressive measure like intubation. I suspect that she might have aspirated mucus (nursing and daughter report coughing during her neb treatment when this happen) during the breathing treatment leading to this respiratory failure given the fact that throughout this hospital stay we were battling her cough and secretions.     Pt was able to take down some medications this AM, tolerated breakfast with no sign of aspiration, suspect this is more of acute than a chronic issue.     At this time, her daughter is calling family to visit, we will keep her on the bipap for now but once the family is ready that we will start comfort care measure. Daughter is in agreement with this plan.

## 2018-01-04 NOTE — PT/OT/SLP EVAL
"Physical Therapy Evaluation and Discharge Note    Patient Name:  Jahaira Garcia   MRN:  [id number]    Recommendations:     Discharge Recommendations:  home with home health (24/7 assist)   Discharge Equipment Recommendations: hospital bed   Barriers to discharge: None pt has 24/7 care; requires increase assist at this time    Assessment:     Jahaira Garcia is a 89 y.o. female admitted with a medical diagnosis of Acute on chronic respiratory failure with hypoxia. Pt unable to tolerate much mobility this visit due to trouble breathing and increase respiratory effort. Pt unable to answer questions and frequenting stating "I can't breathe".  RN present and aware; O2 sats remained 98% on 3-4LO2.  Pt does not demonstrate the capacity to tolerate acute PT intervention at this time.  OT to follow pt and will notify PT if functional status and tolerance improves.  At this time, pt will be d/c from acute PT. Please re-consult should pt's functional status change. Recommend d/c to home with HHPT.  Pt has 24/7 sitters available; will need 24/7 care at this time.       Education:  Education provided to pt regarding: PT role/POC. Verbalized understanding.     Whiteboard updated with correct mobility information. RN/PCT notified.  drawsheet transfer at this time.     Recent Surgery: * No surgery found *      Plan:     During this hospitalization, patient does not require further acute PT services.  Please re-consult if situation changes.     Plan of Care Reviewed with: patient, caregiver    Subjective     Communicated with RN prior to session.  Patient found in bed with HOB elevated; sitter at bedside, upon PT entry to room, agreeable to evaluation.      "I can't breathe."   Patient comments/goals: to go home  Pain/Comfort: no pain reported; difficulty breathing    Patients cultural, spiritual, Roman [employer] conflicts given the current situation: none stated    Living Environment:  Lives with: 24/7 caregivers/sitters  Lives in: " [facility]  PLOF/Level of assist: Mod (I) using Rolling Walker; Assist with dressing/bathing  Bath: tub/shower with seat presenst  DME: shower chair; SPC; RW; w/c; BSC; home O2      Assist available upon d/c: has 24/7 care available    Objective:     Patient found with: oxygen, telemetry     General Precautions: Standard, fall, droplet, diabetic   Orthopedic Precautions:N/A   Braces: N/A     Exams:  Pt oriented x Person, Place, Time .     Communication: clear/fluent    Follows Commands: Follows two-step commands    Posture: Rounded shoulder, Head forward and Posterior pelvic tilt  Skin Integrity: Visible skin intact  Edema:  None noted      Range of Motion and Strength Examination    Right Lower Extremity Range of Motion: WFL    Left Lower Extremity Range of Motion: WFL    Demonstrated only at least 2/5 strength grossly bilaterally due to difficulty breathing and resistance to activity    Fine Motor Coordination:   intact      Gross Motor Coordination:  WFL      Functional Mobility:  Bed Mobility:  Rolling: Minimal Assistance bilateral directions  Supine to Sit:  Not performed due to labored breathing and pt reporting difficulty  Sit to supine:    Scooting: Total Assistance via drawsheet towards HOB    Transfers:   Not appropriate    Balance:  Unable to assess this visit      AM-PAC 6 CLICK MOBILITY  Total Score:9       Patient left HOB elevated with all lines intact, call button in reach and sitter present.    GOALS:    Physical Therapy Goals     Not on file          Multidisciplinary Problems (Resolved)        Problem: Physical Therapy Goal    Goal Priority Disciplines Outcome Goal Variances Interventions   Physical Therapy Goal   (Resolved)     PT/OT, PT Outcome(s) achieved     Description:    No goals established at this time. No acute PT intervention at this time.                    History:     Past Medical History:   Diagnosis Date    Arthritis     Basal cell carcinoma     Complete heart block 2016    Diabetes  mellitus type II     Dysphagia     GERD (gastroesophageal reflux disease)     Hiatal hernia     History of colon polyps     HTN (hypertension)     Hyperlipidemia     Lung disease, interstitial     Neuropathy     OA (osteoarthritis)     Pacemaker     [place]    Pulmonary fibrosis     PVC (premature ventricular contraction)     Steroid long-term use 11/8/2012       Past Surgical History:   Procedure Laterality Date    APPENDECTOMY      CATARACT EXTRACTION EXTRACAPSULAR W/ INTRAOCULAR LENS IMPLANTATION      X 2    CHOLECYSTECTOMY      COLONOSCOPY      COLONOSCOPY N/A 8/28/2017    Procedure: COLONOSCOPY;  Surgeon: GAGE Bear MD;  Location: [facility] ([address] [place] [address]);  Service: Endoscopy;  Laterality: N/A;  multiple medical issues/pul htn/systolic pressure 66 June 23 2016/on continuous O2/Xarelto/OK to hold 2 days per Dr Cornelius Ann/see telephone encounter dated 8/24/17/svn    ESOPHAGOGASTRODUODENOSCOPY      INSERT / REPLACE / REMOVE PACEMAKER  06/2016    [place]    TONSILLECTOMY, ADENOIDECTOMY      TOTAL KNEE ARTHROPLASTY         Clinical Decision Making:     Personal factors/comorbidities: HTN; HLD; DM2; pulmonary fibrosis. The listed co-morbidities and personal factors impact pt's current level and progress with functional mobility and independence.   Body systems elements affected: lower extremities, upper extremities, trunk, musculoskeletal system, neuromuscular system, cardiovascular, pulmonary system  Impairments: see assessment below  Clinical Presentation: unstable  Functional Outcome Tools: AMPAC, MMT, ROM  Evaluation Complexity Level: low    Time Tracking:     PT Received On: 01/04/18  PT Start Time: 1054     PT Stop Time: 1110  PT Total Time (min): 16 min     Billable Minutes: Evaluation 16      Shantal Hoyt PT, DPT  Pager: [phone]  1/4/2018

## 2018-01-04 NOTE — PROGRESS NOTES
[facility] Hospital Medicine  Progress Note    Patient Name: Jahaira Garcia  MRN: [id number]  Patient Class: IP- Inpatient   Admission Date: 1/1/2018  Length of Stay: 2 days  Attending Physician: James Jin MD  Primary Care Provider: Gayathri Resendiz MD    [facility] Medicine Team: [facility] HOSP MED A James iJn MD    Subjective:     Principal Problem:Acute on chronic respiratory failure with hypoxia    HPI:  Jahaira Garcia is a 89 y.o. female with PMHx IPF, HTN, HLD, Complete heart block (s/p pacemarker) DM2 and chronic steroid use who presents to [facility] ED at behest of HH nursing for acute encephalopathy. She is on 4L O2 at baseline at home (recently inc from 2.5L). Family is at bedside and  Reports increased confusion and decreased responsiveness at home as well as fevers, increased cough productive of sputum. Was not feeling well yesterday evening after being in normal health. This am family had difficulty arousing her.       Per family and pulm clinic notes (patient of Dr. Gordon) she is on 4L O2 and tachypneic at baseline. She has extensive documentation regarding goals of care and advance directives, specifically no intubation and no chest compressions. The daughter is POA with documentation in the system and not only agreed but emphasized these directives. The patient was recently treated with a short course of azithromycin for presumed bronchitis in the past week and has completed therapy.      Workup in ED was notable for a elevated lactic acid of 3.8 which is pre-fluid bolus. UA was unremarkable and CXR is largely unchanged, however WCC is Elevated. She was placed on home bipap and critical care was consulted for sepsis and hypoxic respiratory failure. And pt deemed appropriate for floor based on goals of care. Flu+       Hospital Course:  1/2/2018: Admitted for respiratory failure suspected due to PNA vs flu. +Flu test in ED, started on Tamiflu. Pt required BiPAP overnight,  which was stop this AM. Pt with still with fever this early AM. Overall she appears fatigue, family at bedside, family is interested in hospice given her deterioration. I explain that we will treat her current symptoms and monitor. Pt may qualify for home hospice if her respiratory status improves. BIPAP QHS. OLIVIA on admission with some improvement    1/3/2018: improvement of respiratory status at this time, overall looks more comfortable, pt main complain is the cough. Afebrile. Hyperglycemia due to steroid. Will need to be on Tamiflu for 10 days total. Updated the daughter. Plan is for patient to be discharge home when stable, she has sitter. May transition to hospice at a later time (AIM?) daughter is in agreement with this plan. PT/OT eval for needs at home.          Interval History: See above.    Review of Systems     Constitutional: Negative for chills and fever  Eyes: Negative for visual disturbance.   Respiratory: Positive for cough and shortness of breath.    Cardiovascular: Negative for chest pain and leg swelling.   Gastrointestinal: Negative for abdominal pain and diarrhea.   Neurological: Positive for weakness.        Objective:     Vital Signs (Most Recent):  Temp: 97.7 °F (36.5 °C) (01/03/18 1955)  Pulse: 81 (01/03/18 1955)  Resp: 17 (01/03/18 1955)  BP: (!) 178/79 (01/03/18 1955)  SpO2: 99 % (01/03/18 1955) Vital Signs (24h Range):  Temp:  [97.5 °F (36.4 °C)-97.9 °F (36.6 °C)] 97.7 °F (36.5 °C)  Pulse:  [68-88] 81  Resp:  [17-24] 17  SpO2:  [97 %-100 %] 99 %  BP: (131-178)/(61-79) 178/79     Weight: 66.7 kg (147 lb)  Body mass index is 31.81 kg/m².    Intake/Output Summary (Last 24 hours) at 01/03/18 2005  Last data filed at 01/03/18 1833   Gross per 24 hour   Intake              600 ml   Output                0 ml   Net              600 ml      Physical Exam    Constitutional: follows commands and able to answer some questions. She appears well-developed and well-nourished. Frailed Female  HEENT:  EOMI, nl conjunctiva, no LAD, neck supple  Cardiovascular: Normal rate, S1 normal and S2 normal.  Exam reveals no gallop and no friction rub.    No murmur heard.  Pulmonary/Chest: No accessory muscle usage. Tachypnea noted. No respiratory distress. She has no decreased breath sounds. She has wheezes. She has rales.   Neurological: No focal deficits.   Skin: Wound LLE. Dressing appears C/D/I.       MELD-Na score: 7 at 1/3/2018  5:33 AM  MELD score: 7 at 1/3/2018  5:33 AM  Calculated from:  Serum Creatinine: 1.1 mg/dL at 1/3/2018  5:33 AM  Serum Sodium: 136 mmol/L at 1/3/2018  5:33 AM  Total Bilirubin: 0.8 mg/dL (Rounded to 1) at 1/1/2018 10:16 AM  INR(ratio): 1.0 at 1/1/2018 10:16 AM  Age: 89 years    Significant Labs:  CBC:    Recent Labs  Lab 01/02/18  0356 01/03/18  0533   WBC 11.71 9.00   HGB 11.4* 9.5*   HCT 34.9* 28.9*    210     CMP:    Recent Labs  Lab 01/02/18  0356 01/03/18  0533   * 136   K 4.5 4.5    105   CO2 20* 22*   * 217*   BUN 31* 27*   CREATININE 1.5* 1.1   CALCIUM 8.0* 7.7*   ANIONGAP 11 9   EGFRNONAA 30.7* 44.6*     PTINR:  No results for input(s): INR in the last 48 hours.       Assessment/Plan:      * Acute on chronic respiratory failure with hypoxia    - see above  - will consider hospice inpatient vs outpatient if patient decompensate, but at this time, improving  - pt is DNR/DNI, ok with BIPAP          Severe sepsis    On admission: T101, Hr128, Rr46, WBC 16k w/ LA 3.8. Pt much imrpoved on BIPAP and tolerating. DNR. Given IPF will cover broadly.   Suspect that her sepsis source is respiratory, pt with positive influenza A, still with fever on 1/2 AM  - started on 1/1 tamiflu for 5 days total  - she received broad spectrum abx: vanc, cefepime. Stop Vancomycin, 1/2, stop Cefepime 1/3  - with patient might have Virus-induced secondary bacterial infection, may need full course of abx  - change to PO Avalox starting 1/4  - Sepsis: Close to resolving          Influenza A     - Tested + in ED for Influenza A  - Start Tamiflu for 5 days total  - see above  - Symptomatic treatment of cough          ILD (interstitial lung disease)    Acute of on chronic hypoxemic respiratory failure due to ILD and Influenza  - Abx as above. Home O2 2-4L. Inc home prednisone 10mg to 60mg on admission  - wean down steroid at this time, as respiratory status is better            Acute renal failure superimposed on stage 3 chronic kidney disease    Cr baseline 1.3, on admit was 2  Back to baseline          Wound of left leg    Has home health wound care. Wound care ordered here. Silvadiene ordered.           Paroxysmal atrial fibrillation    Continue xarelto, coreg 3.25 bid          Essential hypertension    - BP now elevated  - restart losartan           Uncontrolled type 2 diabetes mellitus with stage 4 chronic kidney disease, with long-term current use of insulin    Given levemir 15u QHS and Novolog 4U TID WMI.  Hyperglycemia in setting of steroid use and restarting of diet            VTE Risk Mitigation         Ordered     rivaroxaban tablet 15 mg  With dinner     Route:  Oral        01/01/18 4709              James Jin MD  Department of Hospital Medicine   [facility]

## 2018-01-04 NOTE — SUBJECTIVE & OBJECTIVE
Interval History: see above    Review of Systems   Unable to perform ROS: Severe respiratory distress     Objective:     Vital Signs (Most Recent):  Temp: 96.7 °F (35.9 °C) (01/04/18 1130)  Pulse: 66 (01/04/18 1258)  Resp: (!) 28 (01/04/18 1258)  BP: (!) 170/72 (01/04/18 1258)  SpO2: 97 % (01/04/18 1258) Vital Signs (24h Range):  Temp:  [95.8 °F (35.4 °C)-97.7 °F (36.5 °C)] 96.7 °F (35.9 °C)  Pulse:  [] 66  Resp:  [17-33] 28  SpO2:  [96 %-100 %] 97 %  BP: (135-196)/(70-84) 170/72     Weight: 66.7 kg (147 lb)  Body mass index is 31.81 kg/m².    Intake/Output Summary (Last 24 hours) at 01/04/18 1313  Last data filed at 01/03/18 1833   Gross per 24 hour   Intake              600 ml   Output                0 ml   Net              600 ml      Physical Exam   Constitutional: She appears well-developed. She appears distressed.   Neck: Normal range of motion. No tracheal deviation present.   Cardiovascular: Normal rate and regular rhythm.    No murmur heard.  Pulmonary/Chest: She is in respiratory distress. She has no wheezes. She has no rales.   Very course breath sound, no wheezing noted   Abdominal: Soft. Bowel sounds are normal. She exhibits no distension. There is no tenderness.   Neurological:   Lethargic, obtundation, does not response to command       MELD-Na score: 7 at 1/3/2018  5:33 AM  MELD score: 7 at 1/3/2018  5:33 AM  Calculated from:  Serum Creatinine: 1.1 mg/dL at 1/3/2018  5:33 AM  Serum Sodium: 136 mmol/L at 1/3/2018  5:33 AM  Total Bilirubin: 0.8 mg/dL (Rounded to 1) at 1/1/2018 10:16 AM  INR(ratio): 1.0 at 1/1/2018 10:16 AM  Age: 89 years    Significant Labs:  CBC:    Recent Labs  Lab 01/03/18  0533 01/04/18  0445   WBC 9.00 7.42   HGB 9.5* 10.1*   HCT 28.9* 31.0*    229     CMP:    Recent Labs  Lab 01/03/18  0533 01/04/18  0445    135*   K 4.5 4.4    105   CO2 22* 23   * 152*   BUN 27* 26*   CREATININE 1.1 0.9   CALCIUM 7.7* 7.3*   ANIONGAP 9 7*   EGFRNONAA 44.6* 56.9*

## 2018-01-04 NOTE — ASSESSMENT & PLAN NOTE
Acute of on chronic hypoxemic respiratory failure due to ILD and Influenza  - Abx as above. Home O2 2-4L. Inc home prednisone 10mg to 60mg on admission  - wean down steroid at this time, as respiratory status is better

## 2018-01-04 NOTE — PROGRESS NOTES
[facility] Hospital Medicine  Progress Note    Patient Name: Jahaira Garcia  MRN: [id number]  Patient Class: IP- Inpatient   Admission Date: 1/1/2018  Length of Stay: 3 days  Attending Physician: James Jin MD  Primary Care Provider: Gayathri Resendiz MD    [facility] Medicine Team: [facility] HOSP MED A James Jin MD    Subjective:     Principal Problem:Acute on chronic respiratory failure with hypoxia    HPI:  Jahaira Garcia is a 89 y.o. female with PMHx IPF, HTN, HLD, Complete heart block (s/p pacemarker) DM2 and chronic steroid use who presents to [facility] ED at behest of HH nursing for acute encephalopathy. She is on 4L O2 at baseline at home (recently inc from 2.5L). Family is at bedside and  Reports increased confusion and decreased responsiveness at home as well as fevers, increased cough productive of sputum. Was not feeling well yesterday evening after being in normal health. This am family had difficulty arousing her.       Per family and pulm clinic notes (patient of Dr. Gordon) she is on 4L O2 and tachypneic at baseline. She has extensive documentation regarding goals of care and advance directives, specifically no intubation and no chest compressions. The daughter is POA with documentation in the system and not only agreed but emphasized these directives. The patient was recently treated with a short course of azithromycin for presumed bronchitis in the past week and has completed therapy.      Workup in ED was notable for a elevated lactic acid of 3.8 which is pre-fluid bolus. UA was unremarkable and CXR is largely unchanged, however WCC is Elevated. She was placed on home bipap and critical care was consulted for sepsis and hypoxic respiratory failure. And pt deemed appropriate for floor based on goals of care. Flu+       Hospital Course:  1/2/2018: Admitted for respiratory failure suspected due to PNA vs flu. +Flu test in ED, started on Tamiflu. Pt required BiPAP overnight,  which was stop this AM. Pt with still with fever this early AM. Overall she appears fatigue, family at bedside, family is interested in hospice given her deterioration. I explain that we will treat her current symptoms and monitor. Pt may qualify for home hospice if her respiratory status improves. BIPAP QHS. OLIVIA on admission with some improvement    1/3/2018: improvement of respiratory status at this time, overall looks more comfortable, pt main complain is the cough. Afebrile. Hyperglycemia due to steroid. Will need to be on Tamiflu for 10 days total. Updated the daughter. Plan is for patient to be discharge home when stable, she has sitter. May transition to hospice at a later time (AIM?) daughter is in agreement with this plan. PT/OT eval for needs at home.      1/4/2018 This AM on evaluation, report shortness of breath, still with productive cough. She was on 4L NC at that time. I ordered more breathing treatment. At ~12pm, pt was getting a breathing treatment and went unresponsive at this time. CORE was call, pt was mildly arousal to sternal rub but did not follow command. She was placed on DNR, ABG was done that showed no sign of Hypercapnia or Hypoxia. Sat 97-99% on BiPAP. Lungs are still very tight, no wheezing noted. CXR done showed no drastic changes ie pnx, pleural effusion that might cause this. Glucose was WNL. Daughter at bedside, I had a long conversation with her, we will respect her wishes and will not purse aggressive measure like intubation. I suspect that she might have aspirated mucus (nursing and daughter report coughing during her neb treatment when this happen) during the breathing treatment leading to this respiratory failure given the fact that throughout this hospital stay we were battling her cough and secretions.     Pt was able to take down some medications this AM, tolerated breakfast with no sign of aspiration, suspect this is more of acute than a chronic issue.     At this time, her  daughter is calling family to visit, we will keep her on the bipap for now but once the family is ready that we will start comfort care measure. Daughter is in agreement with this plan.          Interval History: see above    Review of Systems   Unable to perform ROS: Severe respiratory distress     Objective:     Vital Signs (Most Recent):  Temp: 96.7 °F (35.9 °C) (01/04/18 1130)  Pulse: 66 (01/04/18 1258)  Resp: (!) 28 (01/04/18 1258)  BP: (!) 170/72 (01/04/18 1258)  SpO2: 97 % (01/04/18 1258) Vital Signs (24h Range):  Temp:  [95.8 °F (35.4 °C)-97.7 °F (36.5 °C)] 96.7 °F (35.9 °C)  Pulse:  [] 66  Resp:  [17-33] 28  SpO2:  [96 %-100 %] 97 %  BP: (135-196)/(70-84) 170/72     Weight: 66.7 kg (147 lb)  Body mass index is 31.81 kg/m².    Intake/Output Summary (Last 24 hours) at 01/04/18 1313  Last data filed at 01/03/18 1833   Gross per 24 hour   Intake              600 ml   Output                0 ml   Net              600 ml      Physical Exam   Constitutional: She appears well-developed. She appears distressed.   Neck: Normal range of motion. No tracheal deviation present.   Cardiovascular: Normal rate and regular rhythm.    No murmur heard.  Pulmonary/Chest: She is in respiratory distress. She has no wheezes. She has no rales.   Very course breath sound, no wheezing noted   Abdominal: Soft. Bowel sounds are normal. She exhibits no distension. There is no tenderness.   Neurological:   Lethargic, obtundation, does not response to command       MELD-Na score: 7 at 1/3/2018  5:33 AM  MELD score: 7 at 1/3/2018  5:33 AM  Calculated from:  Serum Creatinine: 1.1 mg/dL at 1/3/2018  5:33 AM  Serum Sodium: 136 mmol/L at 1/3/2018  5:33 AM  Total Bilirubin: 0.8 mg/dL (Rounded to 1) at 1/1/2018 10:16 AM  INR(ratio): 1.0 at 1/1/2018 10:16 AM  Age: 89 years    Significant Labs:  CBC:    Recent Labs  Lab 01/03/18  0533 01/04/18  0445   WBC 9.00 7.42   HGB 9.5* 10.1*   HCT 28.9* 31.0*    229     CMP:    Recent Labs  Lab  01/03/18  0533 01/04/18  0445    135*   K 4.5 4.4    105   CO2 22* 23   * 152*   BUN 27* 26*   CREATININE 1.1 0.9   CALCIUM 7.7* 7.3*   ANIONGAP 9 7*   EGFRNONAA 44.6* 56.9*        Assessment/Plan:      * Acute on chronic respiratory failure with hypoxia    - Hx of ILD who was admitted for influenza, on home oxygen 2-4L,     This AM on evaluation, report shortness of breath, still with productive cough. She was on 4L NC at that time. I ordered more breathing treatment. At ~12pm, pt was getting a breathing treatment and went unresponsive at this time. CORE was call, pt was mildly arousal to sternal rub but did not follow command. She was placed on DNR, ABG was done that showed no sign of Hypercapnia or Hypoxia. Sat 97-99% on BiPAP. Lungs are still very tight, no wheezing noted. CXR done showed no drastic changes ie pnx, pleural effusion that might cause this. Daughter at bedside, I had a long conversation with her, we will respect her wishes and will not purse aggressive measure like intubation. I suspect that she might have aspirated mucus (nursing and daughter report coughing during her neb treatment when this happen) during the breathing treatment leading to this respiratory failure given the fact that throughout this hospital stay we were battling her cough and secretions.     Pt was able to take down some medications this AM, tolerated breakfast with no sign of aspiration, suspect this is more of acute than a chronic issue.     At this time, her daughter is calling family to visit, we will keep her on the bipap for now but once the family is ready that we will start comfort care measure. Daughter is in agreement with this plan.            Severe sepsis    On admission: T101, Hr128, Rr46, WBC 16k w/ LA 3.8. Pt much imrpoved on BIPAP and tolerating. DNR. Given IPF will cover broadly.   Suspect that her sepsis source is respiratory, pt with positive influenza A, still with fever on 1/2 AM  -  started on 1/1 tamiflu for 5 days total  - she received broad spectrum abx: vanc, cefepime. Stop Vancomycin, 1/2, stop Cefepime 1/3  - with patient might have Virus-induced secondary bacterial infection, may need full course of abx  - change to PO Avalox starting 1/4  - see above          Influenza A    - Tested + in ED for Influenza A  - Start Tamiflu for 5 days total  - see above          ILD (interstitial lung disease)    Acute of on chronic hypoxemic respiratory failure due to ILD and Influenza  - wean down steroid at this time  - see above            Acute renal failure superimposed on stage 3 chronic kidney disease    Cr baseline 1.3, on admit was 2  Back to baseline         VTE Risk Mitigation         Ordered     rivaroxaban tablet 15 mg  With dinner     Route:  Oral        01/01/18 9351        Dispo: transitioning to comfort care once family is ready given new respiratory distress      James Jin MD  Department of Hospital Medicine   [facility]

## 2018-01-04 NOTE — ASSESSMENT & PLAN NOTE
On admission: T101, Hr128, Rr46, WBC 16k w/ LA 3.8. Pt much imrpoved on BIPAP and tolerating. DNR. Given IPF will cover broadly.   Suspect that her sepsis source is respiratory, pt with positive influenza A, still with fever on 1/2 AM  - started on 1/1 tamiflu for 5 days total  - she received broad spectrum abx: vanc, cefepime. Stop Vancomycin, 1/2, stop Cefepime 1/3  - with patient might have Virus-induced secondary bacterial infection, may need full course of abx  - change to PO Avalox starting 1/4  - Sepsis: Close to resolving

## 2018-01-04 NOTE — PLAN OF CARE
Problem: Occupational Therapy Goal  Goal: Occupational Therapy Goal  Goals to be met by: 1/11/18     Patient will increase functional independence with ADLs by performing:    UE Dressing with Moderate Assistance.  LE Dressing with Maximum Assistance.  Grooming while seated with Stand-by Assistance.  Toileting from bedside commode with Moderate Assistance for hygiene and clothing management.   Sitting at edge of bed x15 minutes with Minimal Assistance.  Rolling to Bilateral with Stand-by Assistance.   Supine to sit with Minimal Assistance.  Stand pivot transfers with Moderate Assistance.    Outcome: Ongoing (interventions implemented as appropriate)  OT eval completed.

## 2018-01-04 NOTE — PLAN OF CARE
Left msg for Nasreen is the POA re: preference for hh , either Family or Home Instead.  Both have AIM programs

## 2018-01-04 NOTE — PROGRESS NOTES
"Pt refuses to wear BiPAP. Pt removed BiPAP and demands NC. Nurse placed NC at 4 L on patient at this time. Daughter wants to do what the patient wants. Dr Jin paged and notified of situation. Per Annabel, okay to have on NC, but wants nurse to encourage pt to continue wearing BiPAP. Per Annabel, okay to hold medications for now if patient refusing. Will continue to monitor.     1430- Encouraged pt to wear BiPAP and educated on reasons why. Pt states "oh dont tell me that."  "

## 2018-01-04 NOTE — ASSESSMENT & PLAN NOTE
Acute of on chronic hypoxemic respiratory failure due to ILD and Influenza  - wean down steroid at this time  - see above

## 2018-01-04 NOTE — PLAN OF CARE
Problem: Patient Care Overview  Goal: Plan of Care Review  Outcome: Ongoing (interventions implemented as appropriate)  Patient remained free of falls and injuries during shift.  Patient took medications without incident.  Patient requested cough medicine several times during the night.  No other concerns noted.

## 2018-01-04 NOTE — PROGRESS NOTES
Update    Patient became more awake after BIPAP this afternoon. She was taken off BiPAP and place on NC. Pt using assessory muscle to breath, tachypnic on exam, lungs without improvement. Overall she looks uncomfortable. Pt wavers back and forth about being place back on BiPAP or not, she would say yes but then didn't want it later    I had a conversation with her daughters outside the room, I expressed the concern that we are not giving her comfort by doing this and that I suspect that she will tired out tonight. I recommend that we switch to comfort care/end of life care, I suggest that we start with Medications to make her comfortable so she can pass away in peace.     Updated the nurse and charge    Orders placed.     James Jin MD

## 2018-01-04 NOTE — CODE DOCUMENTATION
"RN notified by respiratory therapist that pt "went out" unresponsive during respiratory treatment and would not respond to sternal rub. Resp therapist took treatment off patient and placed patient on BiPAP  "

## 2018-01-04 NOTE — SUBJECTIVE & OBJECTIVE
Interval History: See above.    Review of Systems     Constitutional: Negative for chills and fever  Eyes: Negative for visual disturbance.   Respiratory: Positive for cough and shortness of breath.    Cardiovascular: Negative for chest pain and leg swelling.   Gastrointestinal: Negative for abdominal pain and diarrhea.   Neurological: Positive for weakness.        Objective:     Vital Signs (Most Recent):  Temp: 97.7 °F (36.5 °C) (01/03/18 1955)  Pulse: 81 (01/03/18 1955)  Resp: 17 (01/03/18 1955)  BP: (!) 178/79 (01/03/18 1955)  SpO2: 99 % (01/03/18 1955) Vital Signs (24h Range):  Temp:  [97.5 °F (36.4 °C)-97.9 °F (36.6 °C)] 97.7 °F (36.5 °C)  Pulse:  [68-88] 81  Resp:  [17-24] 17  SpO2:  [97 %-100 %] 99 %  BP: (131-178)/(61-79) 178/79     Weight: 66.7 kg (147 lb)  Body mass index is 31.81 kg/m².    Intake/Output Summary (Last 24 hours) at 01/03/18 2005  Last data filed at 01/03/18 1833   Gross per 24 hour   Intake              600 ml   Output                0 ml   Net              600 ml      Physical Exam    Constitutional: follows commands and able to answer some questions. She appears well-developed and well-nourished. Frailed Female  HEENT: EOMI, nl conjunctiva, no LAD, neck supple  Cardiovascular: Normal rate, S1 normal and S2 normal.  Exam reveals no gallop and no friction rub.    No murmur heard.  Pulmonary/Chest: No accessory muscle usage. Tachypnea noted. No respiratory distress. She has no decreased breath sounds. She has wheezes. She has rales.   Neurological: No focal deficits.   Skin: Wound LLE. Dressing appears C/D/I.       MELD-Na score: 7 at 1/3/2018  5:33 AM  MELD score: 7 at 1/3/2018  5:33 AM  Calculated from:  Serum Creatinine: 1.1 mg/dL at 1/3/2018  5:33 AM  Serum Sodium: 136 mmol/L at 1/3/2018  5:33 AM  Total Bilirubin: 0.8 mg/dL (Rounded to 1) at 1/1/2018 10:16 AM  INR(ratio): 1.0 at 1/1/2018 10:16 AM  Age: 89 years    Significant Labs:  CBC:    Recent Labs  Lab 01/02/18  0356 01/03/18  0533    WBC 11.71 9.00   HGB 11.4* 9.5*   HCT 34.9* 28.9*    210     CMP:    Recent Labs  Lab 01/02/18  0356 01/03/18  0533   * 136   K 4.5 4.5    105   CO2 20* 22*   * 217*   BUN 31* 27*   CREATININE 1.5* 1.1   CALCIUM 8.0* 7.7*   ANIONGAP 11 9   EGFRNONAA 30.7* 44.6*     PTINR:  No results for input(s): INR in the last 48 hours.

## 2018-01-04 NOTE — ASSESSMENT & PLAN NOTE
- see above  - will consider hospice inpatient vs outpatient if patient decompensate, but at this time, improving  - pt is DNR/DNI, ok with BIPAP

## 2018-01-04 NOTE — CODE DOCUMENTATION
ABG drawn, EKG, Blood Sugar taken, Labs drawn, CXR taken, Dr Jin speaking with the daughter about comfort measures.  Will re-evaluate in an hour and possibly remove BiPap then.  Patient just awakened and wants to speak with daughter.   arrived.  Will continue to monitor.

## 2018-01-04 NOTE — PROGRESS NOTES
"Called to pt room. Pt screaming "I cant breathe." Pt very congested, lung sounds coarse. Accessory/abd muscle use. Pt on 4 L NC satting at 99%. /78. Pt refused to swallow oral med for BP and refused to swallow tamiflu. IM A paged. Will continue to monitor.   "

## 2018-01-04 NOTE — ASSESSMENT & PLAN NOTE
Given levemir 15u QHS and Novolog 4U TID WMI.  Hyperglycemia in setting of steroid use and restarting of diet

## 2018-01-04 NOTE — ASSESSMENT & PLAN NOTE
On admission: T101, Hr128, Rr46, WBC 16k w/ LA 3.8. Pt much imrpoved on BIPAP and tolerating. DNR. Given IPF will cover broadly.   Suspect that her sepsis source is respiratory, pt with positive influenza A, still with fever on 1/2 AM  - started on 1/1 tamiflu for 5 days total  - she received broad spectrum abx: vanc, cefepime. Stop Vancomycin, 1/2, stop Cefepime 1/3  - with patient might have Virus-induced secondary bacterial infection, may need full course of abx  - change to PO Avalox starting 1/4  - see above

## 2018-01-04 NOTE — PLAN OF CARE
Problem: Physical Therapy Goal  Goal: Physical Therapy Goal    No goals established at this time. No acute PT intervention at this time.  Outcome: Outcome(s) achieved Date Met: 01/04/18        PT evaluation completed. Pt is supervision/mod (I) with all mobility and transfers. Pt does not demonstrate a need for skilled acute PT services. See Eval/DC note to follow.     Shantal Hoyt, PT, DPT  01/04/2018

## 2018-01-04 NOTE — ASSESSMENT & PLAN NOTE
- Tested + in ED for Influenza A  - Start Tamiflu for 5 days total  - see above  - Symptomatic treatment of cough

## 2018-01-04 NOTE — SIGNIFICANT EVENT
Rapid Response Nurse Note     Rapid Response Metrics:     Admit Date: 2018  LOS: 3  Code Status: DNR   Date of Consult: 2018  : 1928  Age: 89 y.o.  Weight:   Wt Readings from Last 1 Encounters:   18 66.7 kg (147 lb)     Race:   Sex: female  Bed: [address] [place] [address] A:   MRN: [id number]  Time Rapid Response Team page Received: Phone call- 1212  Time Rapid Response Team at Bedside: 1215  Time Rapid Response Team left Bedside: 1300  Was the patient discharged from an ICU this admission? No  Was the patient discharged from a PACU within last 24 hours? No  Did the patient receive conscious sedation/general anesthesia within last 24 hours? No  Was the patient in the ED within the past 24 hours? No  Was the patient started on NIPPV within the past 24 hours? Yes  Did this progress into an ARC or CPA: No  Attending Physician: James Jin MD  Primary Service: [facility] MED A  Consult Requested By: James Jin MD   Rapid Response Indication(s): AMS  Disposition: Remain in 1126 A    SITUATION:     Reason for Call:   Called to evaluate the patient for  Neuro, respiratory    BACKGROUND:     Why is the patient in the hospital?: AMS, Influenza  What changed?: During respiratory treatment complained of difficulty breathing then shortly after had period of unresponsiveness.     ASSESSMENT:     What did you find: Upon arrival pt on bipap Sp02 96%, 196/84, HR 74. Not responsive to verbal stimuli but does withdraw to painful stimuli in all four extremities. ELAN Caruso, RN Carla, RT Yazmin Jin, and pt's daughter all at bedside. Chest xray, ABG, EKG, STAT breathing treatment, and labs ordered and are being carried out. Extensive conversation with Dr. Jin, rapid response RN, and patients daughter regarding pt's wishes. As noted pt's wishes prior to this state are to remain DNR. Daughter in agreement with this decision and also aware that all measures are being taken to help patient while respecting her  wishes to be DNR. Plan of care for patient to remain on bipap until family has time to come and visit then will be started on comfort measures and discontinue bipap. Daughter understands and agrees with plan. Plan to call for  per daughters request.  arrived to bedside.     FOLLOW-UP/CONTINGENCY PLAN:     Call back the rapid Response Nurse at x [phone]  For additional       PHYSICIAN ESCALATION:     Orders received and case discussed with Dr Jin.

## 2018-01-05 ENCOUNTER — TELEPHONE (OUTPATIENT)
Dept: PULMONOLOGY | Facility: CLINIC | Age: 83
End: 2018-01-05

## 2018-01-05 VITALS
WEIGHT: 147 LBS | TEMPERATURE: 97 F | SYSTOLIC BLOOD PRESSURE: 176 MMHG | RESPIRATION RATE: 22 BRPM | OXYGEN SATURATION: 97 % | HEART RATE: 87 BPM | DIASTOLIC BLOOD PRESSURE: 84 MMHG | HEIGHT: 57 IN | BODY MASS INDEX: 31.71 KG/M2

## 2018-01-05 PROBLEM — R65.20 SEVERE SEPSIS: Status: RESOLVED | Noted: 2018-01-01 | Resolved: 2018-01-05

## 2018-01-05 PROBLEM — A41.9 SEVERE SEPSIS: Status: RESOLVED | Noted: 2018-01-01 | Resolved: 2018-01-05

## 2018-01-05 LAB
POCT GLUCOSE: 177 MG/DL (ref 70–110)
POCT GLUCOSE: 214 MG/DL (ref 70–110)
POCT GLUCOSE: 226 MG/DL (ref 70–110)

## 2018-01-05 PROCEDURE — 27000221 HC OXYGEN, UP TO 24 HOURS

## 2018-01-05 PROCEDURE — 94761 N-INVAS EAR/PLS OXIMETRY MLT: CPT

## 2018-01-05 PROCEDURE — 63600175 PHARM REV CODE 636 W HCPCS: Performed by: HOSPITALIST

## 2018-01-05 PROCEDURE — 94640 AIRWAY INHALATION TREATMENT: CPT

## 2018-01-05 PROCEDURE — 25000003 PHARM REV CODE 250: Performed by: HOSPITALIST

## 2018-01-05 PROCEDURE — 94660 CPAP INITIATION&MGMT: CPT

## 2018-01-05 PROCEDURE — 99238 HOSP IP/OBS DSCHRG MGMT 30/<: CPT | Mod: ,,, | Performed by: HOSPITALIST

## 2018-01-05 PROCEDURE — 25000242 PHARM REV CODE 250 ALT 637 W/ HCPCS: Performed by: HOSPITALIST

## 2018-01-05 PROCEDURE — 99900035 HC TECH TIME PER 15 MIN (STAT)

## 2018-01-05 RX ORDER — OSELTAMIVIR PHOSPHATE 75 MG/1
75 CAPSULE ORAL DAILY
Qty: 2 CAPSULE | Refills: 0 | Status: SHIPPED | OUTPATIENT
Start: 2018-01-05 | End: 2018-01-07

## 2018-01-05 RX ORDER — PREDNISONE 10 MG/1
10 TABLET ORAL DAILY
Status: DISCONTINUED | OUTPATIENT
Start: 2018-01-05 | End: 2018-01-05 | Stop reason: HOSPADM

## 2018-01-05 RX ORDER — CARVEDILOL 3.12 MG/1
3.12 TABLET ORAL 2 TIMES DAILY
Status: DISCONTINUED | OUTPATIENT
Start: 2018-01-05 | End: 2018-01-05 | Stop reason: HOSPADM

## 2018-01-05 RX ORDER — OSELTAMIVIR PHOSPHATE 75 MG/1
75 CAPSULE ORAL DAILY
Status: DISCONTINUED | OUTPATIENT
Start: 2018-01-05 | End: 2018-01-05 | Stop reason: HOSPADM

## 2018-01-05 RX ORDER — MOXIFLOXACIN HYDROCHLORIDE 400 MG/1
400 TABLET ORAL DAILY
Qty: 4 TABLET | Refills: 0 | Status: SHIPPED | OUTPATIENT
Start: 2018-01-05 | End: 2018-01-09

## 2018-01-05 RX ORDER — GUAIFENESIN 100 MG/5ML
200 SOLUTION ORAL EVERY 4 HOURS PRN
Refills: 0 | COMMUNITY
Start: 2018-01-05 | End: 2018-01-15

## 2018-01-05 RX ORDER — MOXIFLOXACIN HYDROCHLORIDE 400 MG/1
400 TABLET ORAL DAILY
Status: DISCONTINUED | OUTPATIENT
Start: 2018-01-05 | End: 2018-01-05 | Stop reason: HOSPADM

## 2018-01-05 RX ADMIN — IPRATROPIUM BROMIDE AND ALBUTEROL SULFATE 3 ML: .5; 3 SOLUTION RESPIRATORY (INHALATION) at 11:01

## 2018-01-05 RX ADMIN — MORPHINE SULFATE 1 MG: 2 INJECTION, SOLUTION INTRAMUSCULAR; INTRAVENOUS at 03:01

## 2018-01-05 RX ADMIN — HYPROMELLOSE 2910 1 DROP: 5 SOLUTION OPHTHALMIC at 10:01

## 2018-01-05 RX ADMIN — OSELTAMIVIR PHOSPHATE 75 MG: 75 CAPSULE ORAL at 03:01

## 2018-01-05 RX ADMIN — INSULIN ASPART 4 UNITS: 100 INJECTION, SOLUTION INTRAVENOUS; SUBCUTANEOUS at 12:01

## 2018-01-05 RX ADMIN — IPRATROPIUM BROMIDE AND ALBUTEROL SULFATE 3 ML: .5; 3 SOLUTION RESPIRATORY (INHALATION) at 01:01

## 2018-01-05 RX ADMIN — PREDNISONE 10 MG: 10 TABLET ORAL at 01:01

## 2018-01-05 RX ADMIN — IPRATROPIUM BROMIDE AND ALBUTEROL SULFATE 3 ML: .5; 3 SOLUTION RESPIRATORY (INHALATION) at 03:01

## 2018-01-05 RX ADMIN — IPRATROPIUM BROMIDE AND ALBUTEROL SULFATE 3 ML: .5; 3 SOLUTION RESPIRATORY (INHALATION) at 04:01

## 2018-01-05 RX ADMIN — MOXIFLOXACIN HYDROCHLORIDE 400 MG: 400 TABLET, FILM COATED ORAL at 01:01

## 2018-01-05 RX ADMIN — IPRATROPIUM BROMIDE AND ALBUTEROL SULFATE 3 ML: .5; 3 SOLUTION RESPIRATORY (INHALATION) at 07:01

## 2018-01-05 NOTE — PLAN OF CARE
[facility]     Department of Hospital Medicine     [address] [place] [address]     [phone] after hours  [phone] fax                                   HOSPICE  ORDERS     01/05/2018    Admit to Hospice:   Inpatient Service    Diagnoses:  Active Hospital Problems    Diagnosis  POA    *Acute on chronic respiratory failure with hypoxia [J96.21]  Yes     Priority: 1 - High    Influenza A [J10.1]  Yes     Priority: 2     Severe sepsis [A41.9, R65.20]  Yes     Priority: 2     ILD (interstitial lung disease) [J84.9]  Yes     Priority: 3      Chronic    Acute renal failure superimposed on stage 3 chronic kidney disease [N17.9, N18.3]  Yes     Priority: 4     Leg wound, left [S81.802A]  Yes    Wound of left leg [S81.802A]  Yes    Paroxysmal atrial fibrillation [I48.0]  Yes    Uncontrolled type 2 diabetes mellitus with stage 4 chronic kidney disease, with long-term current use of insulin [E11.22, E11.65, N18.4, Z79.4]  Not Applicable     Chronic    Essential hypertension [I10]  Yes     Chronic      Resolved Hospital Problems    Diagnosis Date Resolved POA   No resolved problems to display.       Hospice Qualifying Diagnoses: respiratory failure       Patient has a life expectancy < 6 months due to these conditions.    Vital Signs: Routine per Hospice Protocol.    Allergies:  Review of patient's allergies indicates:   Allergen Reactions    Tramadol      Confusion and sleeping and unsteady.       Diet: regular as tolerated    Activities: As tolerated    Nursing: Per Hospice Routine    Future Orders:  Hospice Medical Director may dictate new orders for comfortable care measures & sign death certificate.    Medications:         Comfort Care Medications Only          DIABETES CARE:    Nurse to perform and educate diabetic management with blood glucose monitoring:        Fingerstick blood sugar AC and HS        Report CBG < 60 or > 350 to physician.          Insulin Sliding Scale         Glucose  Novolog Insulin Subcutaneous        0 - 60   Orange juice or glucose tablet      No insulin   201-250  2 units   251-300  4 units   301-350  6 units   351-400  8 units   >400   10 units then call physician        _________________________________  James Jin MD  01/05/2018

## 2018-01-05 NOTE — ASSESSMENT & PLAN NOTE
Given levemir 15u QHS and Novolog 4U TID WMI.  Hyperglycemia in setting of steroid use and restarting of diet  Resume home medication dose, pt with good appetite

## 2018-01-05 NOTE — ASSESSMENT & PLAN NOTE
On admission: T101, Hr128, Rr46, WBC 16k w/ LA 3.8. Pt much imrpoved on BIPAP and tolerating. DNR. Given IPF will cover broadly.   Suspect that her sepsis source is respiratory, pt with positive influenza A, still with fever on 1/2 AM  - started on 1/1 tamiflu for 5 days total  - she received broad spectrum abx: vanc, cefepime. Stop Vancomycin, 1/2, stop Cefepime 1/3  - with patient might have Virus-induced secondary bacterial infection, may need full course of abx, PO Avalox to complete 7 days total of abx

## 2018-01-05 NOTE — PROGRESS NOTES
Family contacted her Pulmonologist, Dr Gordon who visited the patient, from my conversation with him, he wants the patient to be discharge home with her usual medications. I explanied that yesterday she had a deterioration and that today she is doing better. He did not think inpatient hospice was needed. I discussed with patient POA, they are ok with taking her home today.     D/C home with  AIM program  PO steroid at 10mg per Dr Gordon recommendation, finish out tamiflu 2 days left, Pt POA request to finish out a course of abx, moxifloxacin     James Jin MD

## 2018-01-05 NOTE — ASSESSMENT & PLAN NOTE
On admission: T101, Hr128, Rr46, WBC 16k w/ LA 3.8. Pt much imrpoved on BIPAP and tolerating. DNR. Given IPF will cover broadly.   Suspect that her sepsis source is respiratory, pt with positive influenza A, still with fever on 1/2 AM  - started on 1/1 tamiflu for 5 days total  - she received broad spectrum abx: vanc, cefepime. Stop Vancomycin, 1/2, stop Cefepime 1/3  - with patient might have Virus-induced secondary bacterial infection, may need full course of abx, PO Avalox to complete 7 days total of abx  - resolved

## 2018-01-05 NOTE — PLAN OF CARE
[facility]    HOME HEALTH ORDERS  FACE TO FACE ENCOUNTER    Patient Name: Jahaira Garcia  YOB: 1928    PCP: Gayathri Resendiz MD   PCP Address: [address] [facility] / [place] [address]  PCP Phone Number: [phone]  PCP Fax: [phone]    Encounter Date: 01/05/2018    Admit to Home Health    Admit to AIM program    Diagnoses:  Active Hospital Problems    Diagnosis  POA    *Acute on chronic respiratory failure with hypoxia [J96.21]  Yes     Priority: 1 - High    Influenza A [J10.1]  Yes     Priority: 2     Severe sepsis [A41.9, R65.20]  Yes     Priority: 2     ILD (interstitial lung disease) [J84.9]  Yes     Priority: 3      Chronic    Acute renal failure superimposed on stage 3 chronic kidney disease [N17.9, N18.3]  Yes     Priority: 4     Leg wound, left [S81.802A]  Yes    Wound of left leg [S81.802A]  Yes    Paroxysmal atrial fibrillation [I48.0]  Yes    Uncontrolled type 2 diabetes mellitus with stage 4 chronic kidney disease, with long-term current use of insulin [E11.22, E11.65, N18.4, Z79.4]  Not Applicable     Chronic    Essential hypertension [I10]  Yes     Chronic      Resolved Hospital Problems    Diagnosis Date Resolved POA   No resolved problems to display.       Future Appointments  Date Time Provider Department Center   3/15/2018 8:00 AM HOME MONITOR DEVICE CHECK, [facility]   3/27/2018 1:15 PM PULMONARY FUNCTION [facility]   3/27/2018 1:30 PM PULMONARY FUNCTION [facility] PUL[facility]   3/27/2018 2:00 PM Gonzalo Gordon MD [facility] PULMSVC [facility]   4/24/2018 3:30 PM Marques Byers MD [facility] CARDIO [facility]           I have seen and examined this patient face to face today. My clinical findings that support the need for the home health skilled services and home bound status are the following:  Weakness/numbness causing balance and gait disturbance due to COPD Exacerbation, Weakness/Debility and  Anemia making it taxing to leave home.  Requiring assistive device to leave home due to unsteady gait caused by  COPD Exacerbation, Weakness/Debility and Dehydration.  Medical restrictions requiring assistance of another human to leave home due to  Dyspnea on exertion (SOB) and Unstable ambulation.    Allergies:  Review of patient's allergies indicates:   Allergen Reactions    Tramadol      Confusion and sleeping and unsteady.       Diet: diabetic diet: 2200 calorie    Activities: activity as tolerated    Nursing:   SN to complete comprehensive assessment including routine vital signs. Instruct on disease process and s/s of complications to report to MD. Review/verify medication list sent home with the patient at time of discharge  and instruct patient/caregiver as needed. Frequency may be adjusted depending on start of care date.    Notify MD if SBP > 160 or < 90; DBP > 90 or < 50; HR > 120 or < 50; Temp > 101       CONSULTS:    Physical Therapy to evaluate and treat. Evaluate for home safety and equipment needs; Establish/upgrade home exercise program. Perform / instruct on therapeutic exercises, gait training, transfer training, and Range of Motion.  Occupational Therapy to evaluate and treat. Evaluate home environment for safety and equipment needs. Perform/Instruct on transfers, ADL training, ROM, and therapeutic exercises.   to evaluate for community resources/long-range planning.  Aide to provide assistance with personal care, ADLs, and vital signs.    MISCELLANEOUS CARE:  Routine Skin for Bedridden Patients: Instruct patient/caregiver to apply moisture barrier cream to all skin folds and wet areas in perineal area daily and after baths and all bowel movements.  Diabetic Care:   SN to perform and educate Diabetic management with blood glucose monitoring: and Report CBG < 60 or > 350 to physician.  Home Oxygen:  Oxygen at 4 L/min nasal canula to be used:  Continuously.     WOUND CARE ORDERS  yes:  " Various skin break down:  Location: both arms and legs    Consult ET nurse           Medications: Review discharge medications with patient and family and provide education.      Current Discharge Medication List      START taking these medications    Details   guaifenesin 100 mg/5 ml (ROBITUSSIN) 100 mg/5 mL syrup Take 10 mLs (200 mg total) by mouth every 4 (four) hours as needed for Congestion.  Refills: 0      moxifloxacin (AVELOX) 400 mg tablet Take 1 tablet (400 mg total) by mouth once daily.  Qty: 4 tablet, Refills: 0      oseltamivir (TAMIFLU) 75 MG capsule Take 1 capsule (75 mg total) by mouth once daily.  Qty: 2 capsule, Refills: 0         CONTINUE these medications which have NOT CHANGED    Details   acetaminophen (TYLENOL) 325 MG tablet Take 1-2 tablets (325-650 mg total) by mouth nightly as needed for Pain.      albuterol (PROVENTIL) 2.5 mg /3 mL (0.083 %) nebulizer solution Take 3 mLs (2.5 mg total) by nebulization every 6 (six) hours as needed for Wheezing or Shortness of Breath. Rescue  Qty: 90 each, Refills: 11    Associated Diagnoses: ILD (interstitial lung disease); GAITAN (dyspnea on exertion)      albuterol-ipratropium 2.5mg-0.5mg/3mL (DUO-NEB) 0.5 mg-3 mg(2.5 mg base)/3 mL nebulizer solution Take 3 mLs by nebulization every evening. And every 6 hours as needed.  Refills: 0      artificial tears (ISOPTO TEARS) 0.5 % ophthalmic solution Place 1 drop into both eyes as needed.      BD ULTRA-FINE CHARAN PEN NEEDLES 32 gauge x 5/32" Ndle USE ONE 3 TIMES DAILY  Qty: 300 each, Refills: 0      blood sugar diagnostic (ONETOUCH VERIO) Strp USE ONE 5 TIMES DAILY  Qty: 450 each, Refills: 12      carvedilol (COREG) 3.125 MG tablet Take 1 tablet (3.125 mg total) by mouth 2 (two) times daily.  Qty: 180 tablet, Refills: 2      FLUZONE HIGH-DOSE 2017-18, PF, 180 mcg/0.5 mL vaccine       FOLIC ACID/MULTIVIT-MIN/LUTEIN (CENTRUM SILVER ORAL) Take 1 tablet by mouth once daily.      insulin lispro (HUMALOG KWIKPEN) 100 " unit/mL InPn pen Inject 22 Units into the skin 3 (three) times daily.  Qty: 3 Box, Refills: 3    Comments: We have adjusted patient's insulin dose. She may not need refills at this time.      levalbuterol (XOPENEX) 0.63 mg/3 mL nebulizer solution USE ONE VIAL TWICE DAILY  Qty: 504 mL, Refills: 0    Associated Diagnoses: Cough with expectoration; Idiopathic fibrosing alveolitis, chronic form      losartan (COZAAR) 50 MG tablet Take 1 tablet (50 mg total) by mouth 2 (two) times daily.  Qty: 180 tablet, Refills: 3    Associated Diagnoses: Essential hypertension      melatonin 1 mg Tab Take by mouth.      methyl salicylate-menthol (SALONPAS) 10-3 % SprA Apply topically.      mupirocin (BACTROBAN) 2 % ointment Apply topically 3 (three) times daily.  Qty: 30 g, Refills: 0      ONETOUCH DELICA LANCETS 33 gauge Misc USE THREE TIMES DAILY  Qty: 200 each, Refills: 3      polyethylene glycol (GLYCOLAX) 17 gram PwPk Take 17 g by mouth 3 (three) times daily as needed.  Qty: 100 packet, Refills: 0      pravastatin (PRAVACHOL) 20 MG tablet TAKE ONE DAILY  Qty: 90 tablet, Refills: 0      predniSONE (DELTASONE) 10 MG tablet 2 tablets every AM  Qty: 60 tablet, Refills: 1    Associated Diagnoses: Upper respiratory tract infection, unspecified type; ILD (interstitial lung disease)      psyllium (METAMUCIL) packet Take 1 packet by mouth once daily.  Refills: 12      rivaroxaban (XARELTO) 15 mg Tab Take 1 tablet (15 mg total) by mouth daily with dinner or evening meal.  Qty: 90 tablet, Refills: 3      silver sulfADIAZINE 1% (SILVADENE) 1 % cream Apply topically once daily.  Qty: 50 g, Refills: 0         STOP taking these medications       azithromycin (ZITHROMAX Z-GI) 250 MG tablet Comments:   Reason for Stopping:               I certify that this patient is confined to her home and needs intermittent skilled nursing care, physical therapy and occupational therapy.

## 2018-01-05 NOTE — PLAN OF CARE
Problem: Patient Care Overview  Goal: Plan of Care Review  Outcome: Ongoing (interventions implemented as appropriate)  Patient remained free of falls and injuries during shift.  Patient given pain medication periodically for comfort.  Patient took medications without incident.  No other concerns noted.

## 2018-01-05 NOTE — ASSESSMENT & PLAN NOTE
- Hx of ILD who was admitted for influenza, on home oxygen 2-4L, Pt had acute decompensation on 1/4, CORE was call, pt was mildly arousal to sternal rub but did not follow command. ABG was done that showed no sign of Hypercapnia or Hypoxia. Sat 97-99% on BiPAP. Lungs are still very tight, no wheezing noted. CXR done showed no drastic changes ie pnx, pleural effusion that might cause this. After an hour on BIPAP, pt woke up. Long discussion with POA, we started pt on comfort care plan with PRN ativan and PRN morphine.    - 1/5: patient respiratory status is the same but mental status is better, able to tolerate diet  - Discharge home with home health after discussion with Dr Gordon and KAMRON.

## 2018-01-05 NOTE — DISCHARGE SUMMARY
[facility] Hospital Medicine  Discharge Summary      Patient Name: Jahaira Garcia  MRN: [id number]  Admission Date: 1/1/2018  Hospital Length of Stay: 4 days  Discharge Date and Time:  01/05/2018 1:47 PM  Attending Physician: James Jin MD   Discharging Provider: James Jin MD  Primary Care Provider: Gayathri Resendiz MD  [facility] Medicine Team: [facility] HOSP MED A James Jin MD    HPI:   Jahaira Garcia is a 89 y.o. female with PMHx IPF, HTN, HLD, Complete heart block (s/p pacemarker) DM2 and chronic steroid use who presents to [facility] ED at behest of HH nursing for acute encephalopathy. She is on 4L O2 at baseline at home (recently inc from 2.5L). Family is at bedside and  Reports increased confusion and decreased responsiveness at home as well as fevers, increased cough productive of sputum. Was not feeling well yesterday evening after being in normal health. This am family had difficulty arousing her.       Per family and pulm clinic notes (patient of Dr. Gordon) she is on 4L O2 and tachypneic at baseline. She has extensive documentation regarding goals of care and advance directives, specifically no intubation and no chest compressions. The daughter is POA with documentation in the system and not only agreed but emphasized these directives. The patient was recently treated with a short course of azithromycin for presumed bronchitis in the past week and has completed therapy.      Workup in ED was notable for a elevated lactic acid of 3.8 which is pre-fluid bolus. UA was unremarkable and CXR is largely unchanged, however WCC is Elevated. She was placed on home bipap and critical care was consulted for sepsis and hypoxic respiratory failure. And pt deemed appropriate for floor based on goals of care. Flu+       * No surgery found *      Hospital Course:   1/2/2018: Admitted for respiratory failure suspected due to PNA vs flu. +Flu test in ED, started on Tamiflu. Pt required BiPAP  overnight, which was stop this AM. Pt with still with fever this early AM. Overall she appears fatigue, family at bedside, family is interested in hospice given her deterioration. I explain that we will treat her current symptoms and monitor. Pt may qualify for home hospice if her respiratory status improves. BIPAP QHS. OLIVIA on admission with some improvement    1/3/2018: improvement of respiratory status at this time, overall looks more comfortable, pt main complain is the cough. Afebrile. Hyperglycemia due to steroid. Will need to be on Tamiflu for 10 days total. Updated the daughter. Plan is for patient to be discharge home when stable, she has sitter. May transition to hospice at a later time (AIM?) daughter is in agreement with this plan. PT/OT eval for needs at home.      1/4/2018 This AM on evaluation, report shortness of breath, still with productive cough. She was on 4L NC at that time. I ordered more breathing treatment. At ~12pm, pt was getting a breathing treatment and went unresponsive at this time. CORE was call, pt was mildly arousal to sternal rub but did not follow command. She was placed on DNR, ABG was done that showed no sign of Hypercapnia or Hypoxia. Sat 97-99% on BiPAP. Lungs are still very tight, no wheezing noted. CXR done showed no drastic changes ie pnx, pleural effusion that might cause this. Glucose was WNL. Daughter at bedside, I had a long conversation with her, we will respect her wishes and will not purse aggressive measure like intubation. I suspect that she might have aspirated mucus (nursing and daughter report coughing during her neb treatment when this happen) during the breathing treatment leading to this respiratory failure given the fact that throughout this hospital stay we were battling her cough and secretions.     1/5: Pt was started PRN morphine and PRN ativan overnight which helped with her respiratory status. She is much more awake today, she is eat all her meals. Still  tachypnic on 4L NC. Initial discussion was for inpatient hospice but family contacted pt's pulmonologist. Dr Gordon who visited the patient.  From my conversation with him, he wants the patient to be discharge home with her usual medications. I explanied that yesterday she had a deterioration and that today she is doing better. He did not think inpatient hospice was needed. I discussed with patient POA, they are ok with taking her home today. D/C home with  AIM program.  PO steroid at 10mg per Dr Gordon recommendation, finish out tamiflu 2 days left, Pt POA request to finish out a course of abx, moxifloxacin. All issues were address with the patient POA, she has no needs at this time. Pt to be transport home via ambulance         Consults:   Consults         Status Ordering Provider     Inpatient consult to Critical Care Medicine  Once     Provider:  (Not yet assigned)    Completed GAGE KAY     Inpatient consult to Spiritual Care  Once     Provider:  (Not yet assigned)    Completed GURMEET FREDERICK          Severe sepsis    On admission: T101, Hr128, Rr46, WBC 16k w/ LA 3.8. Pt much imrpoved on BIPAP and tolerating. DNR. Given IPF will cover broadly.   Suspect that her sepsis source is respiratory, pt with positive influenza A, still with fever on 1/2 AM  - started on 1/1 tamiflu for 5 days total  - she received broad spectrum abx: vanc, cefepime. Stop Vancomycin, 1/2, stop Cefepime 1/3  - with patient might have Virus-induced secondary bacterial infection, may need full course of abx, PO Avalox to complete 7 days total of abx  - resolved          Influenza A    - Tested + in ED for Influenza A  - Start Tamiflu for 5 days total            ILD (interstitial lung disease)    Acute of on chronic hypoxemic respiratory failure due to ILD and Influenza  - resume 10mg of steroid  - Discuss with POA, she is to contact Dr Gordon team with any issue          Acute renal failure superimposed on stage 3 chronic kidney  disease    Cr baseline 1.3, on admit was 2  Back to baseline          Paroxysmal atrial fibrillation    Continue xarelto, coreg 3.25 bid            Final Active Diagnoses:    Diagnosis Date Noted POA    PRINCIPAL PROBLEM:  Acute on chronic respiratory failure with hypoxia [J96.21] 01/02/2018 Yes    Influenza A [J10.1] 01/01/2018 Yes    ILD (interstitial lung disease) [J84.9]  Yes     Chronic    Leg wound, left [S81.802A] 01/03/2018 Yes    Wound of left leg [S81.802A]  Yes    Paroxysmal atrial fibrillation [I48.0] 10/21/2016 Yes    Uncontrolled type 2 diabetes mellitus with stage 4 chronic kidney disease, with long-term current use of insulin [E11.22, E11.65, N18.4, Z79.4] 08/01/2012 Not Applicable     Chronic    Essential hypertension [I10] 08/01/2012 Yes     Chronic      Problems Resolved During this Admission:    Diagnosis Date Noted Date Resolved POA    Severe sepsis [A41.9, R65.20] 01/01/2018 01/05/2018 Yes    Acute renal failure superimposed on stage 3 chronic kidney disease [N17.9, N18.3] 08/31/2015 01/05/2018 Yes       Discharged Condition: fair    Disposition: Home or Self Care    Follow Up:    Patient Instructions:     Diet Diabetic 2000 Calories     Activity as tolerated         Significant Diagnostic Studies: Labs:   CMP   Recent Labs  Lab 01/04/18  0445 01/04/18  1253   * 135*   K 4.4 4.6    105   CO2 23 24   * 171*   BUN 26* 26*   CREATININE 0.9 0.9   CALCIUM 7.3* 8.3*   PROT  --  5.7*   ALBUMIN  --  2.4*   BILITOT  --  0.5   ALKPHOS  --  36*   AST  --  57*   ALT  --  34   ANIONGAP 7* 6*   ESTGFRAFRICA >60.0 >60.0   EGFRNONAA 56.9* 56.9*    and CBC   Recent Labs  Lab 01/04/18  0445 01/04/18  1253   WBC 7.42 11.70   HGB 10.1* 10.0*   HCT 31.0* 30.1*    238       Pending Diagnostic Studies:     None         Medications:  Reconciled Home Medications:   Current Discharge Medication List      START taking these medications    Details   guaifenesin 100 mg/5 ml (ROBITUSSIN)  "100 mg/5 mL syrup Take 10 mLs (200 mg total) by mouth every 4 (four) hours as needed for Congestion.  Refills: 0      moxifloxacin (AVELOX) 400 mg tablet Take 1 tablet (400 mg total) by mouth once daily.  Qty: 4 tablet, Refills: 0      oseltamivir (TAMIFLU) 75 MG capsule Take 1 capsule (75 mg total) by mouth once daily.  Qty: 2 capsule, Refills: 0         CONTINUE these medications which have NOT CHANGED    Details   acetaminophen (TYLENOL) 325 MG tablet Take 1-2 tablets (325-650 mg total) by mouth nightly as needed for Pain.      albuterol (PROVENTIL) 2.5 mg /3 mL (0.083 %) nebulizer solution Take 3 mLs (2.5 mg total) by nebulization every 6 (six) hours as needed for Wheezing or Shortness of Breath. Rescue  Qty: 90 each, Refills: 11    Associated Diagnoses: ILD (interstitial lung disease); GAITAN (dyspnea on exertion)      albuterol-ipratropium 2.5mg-0.5mg/3mL (DUO-NEB) 0.5 mg-3 mg(2.5 mg base)/3 mL nebulizer solution Take 3 mLs by nebulization every evening. And every 6 hours as needed.  Refills: 0      artificial tears (ISOPTO TEARS) 0.5 % ophthalmic solution Place 1 drop into both eyes as needed.      BD ULTRA-FINE CHARAN PEN NEEDLES 32 gauge x 5/32" Ndle USE ONE 3 TIMES DAILY  Qty: 300 each, Refills: 0      blood sugar diagnostic (ONETOUCH VERIO) Strp USE ONE 5 TIMES DAILY  Qty: 450 each, Refills: 12      carvedilol (COREG) 3.125 MG tablet Take 1 tablet (3.125 mg total) by mouth 2 (two) times daily.  Qty: 180 tablet, Refills: 2      FLUZONE HIGH-DOSE 2017-18, PF, 180 mcg/0.5 mL vaccine       FOLIC ACID/MULTIVIT-MIN/LUTEIN (CENTRUM SILVER ORAL) Take 1 tablet by mouth once daily.      insulin lispro (HUMALOG KWIKPEN) 100 unit/mL InPn pen Inject 22 Units into the skin 3 (three) times daily.  Qty: 3 Box, Refills: 3    Comments: We have adjusted patient's insulin dose. She may not need refills at this time.      levalbuterol (XOPENEX) 0.63 mg/3 mL nebulizer solution USE ONE VIAL TWICE DAILY  Qty: 504 mL, Refills: 0    " Associated Diagnoses: Cough with expectoration; Idiopathic fibrosing alveolitis, chronic form      losartan (COZAAR) 50 MG tablet Take 1 tablet (50 mg total) by mouth 2 (two) times daily.  Qty: 180 tablet, Refills: 3    Associated Diagnoses: Essential hypertension      melatonin 1 mg Tab Take by mouth.      methyl salicylate-menthol (SALONPAS) 10-3 % SprA Apply topically.      mupirocin (BACTROBAN) 2 % ointment Apply topically 3 (three) times daily.  Qty: 30 g, Refills: 0      ONETOUCH DELICA LANCETS 33 gauge Misc USE THREE TIMES DAILY  Qty: 200 each, Refills: 3      polyethylene glycol (GLYCOLAX) 17 gram PwPk Take 17 g by mouth 3 (three) times daily as needed.  Qty: 100 packet, Refills: 0      pravastatin (PRAVACHOL) 20 MG tablet TAKE ONE DAILY  Qty: 90 tablet, Refills: 0      predniSONE (DELTASONE) 10 MG tablet 2 tablets every AM  Qty: 60 tablet, Refills: 1    Associated Diagnoses: Upper respiratory tract infection, unspecified type; ILD (interstitial lung disease)      psyllium (METAMUCIL) packet Take 1 packet by mouth once daily.  Refills: 12      rivaroxaban (XARELTO) 15 mg Tab Take 1 tablet (15 mg total) by mouth daily with dinner or evening meal.  Qty: 90 tablet, Refills: 3      silver sulfADIAZINE 1% (SILVADENE) 1 % cream Apply topically once daily.  Qty: 50 g, Refills: 0         STOP taking these medications       azithromycin (ZITHROMAX Z-GI) 250 MG tablet Comments:   Reason for Stopping:               Indwelling Lines/Drains at time of discharge:   Lines/Drains/Airways          No matching active lines, drains, or airways          Time spent on the discharge of patient: 25 minutes  Patient was seen and examined on the date of discharge and determined to be suitable for discharge.         James Jin MD  Department of Hospital Medicine  [facility]

## 2018-01-05 NOTE — ASSESSMENT & PLAN NOTE
Acute of on chronic hypoxemic respiratory failure due to ILD and Influenza  - resume 10mg of steroid  - Discuss with POA, she is to contact Dr Gordon team with any issue

## 2018-01-05 NOTE — PLAN OF CARE
Met w pt's daughters who would like an inpt hospice list so MSW Alexandra will provide this and they will visit facilities then make preference.    12:58 update  Ran into Nasreen (SCOTT) who told me the plan changed to hh. Dr rapp notified me that he will write hh/possible aim orders.      13:01 Nasreen [phone] wants Family HH/AIM  Pretty notified of change in plans  MSW Alexandra notified that DR Rapp will write hh/aim orders and they want [facility]    Update: CM called Pulm clinic and msg being sent to nurse for sooner appt. They will send msg to nurse for appt to be made as post hospital f/u within 10 days or so. They were instructed to contact scott cerda at [phone]      Update 1332- cm left msg for ERNIE Morris re: stretcher tx with oxygen back home    Dr rapp stated that pt has oxygen at home so just needs it to transfer    Future Appointments  Date Time Provider Department Center   3/15/2018 8:00 AM HOME MONITOR DEVICE CHECK, [facility]   3/27/2018 1:15 PM PULMONARY FUNCTION [facility]   3/27/2018 1:30 PM PULMONARY FUNCTION [facility]UL[facility]   3/27/2018 2:00 PM Gonzalo Gordon MD [facility] PULMSVC [facility]   4/24/2018 3:30 PM Marques Byers MD [facility] CARDIO [facility]

## 2018-01-05 NOTE — PROGRESS NOTES
[facility] Hospital Medicine  Progress Note    Patient Name: Jahaira Garcia  MRN: [id number]  Patient Class: IP- Inpatient   Admission Date: 1/1/2018  Length of Stay: 4 days  Attending Physician: James Jin MD  Primary Care Provider: Gayathri Resendiz MD    [facility] Medicine Team: [facility]OSP MED A James Jin MD    Subjective:     Principal Problem:Acute on chronic respiratory failure with hypoxia    HPI:  Jahaira Garcia is a 89 y.o. female with PMHx IPF, HTN, HLD, Complete heart block (s/p pacemarker) DM2 and chronic steroid use who presents to [facility] ED at behest of HH nursing for acute encephalopathy. She is on 4L O2 at baseline at home (recently inc from 2.5L). Family is at bedside and  Reports increased confusion and decreased responsiveness at home as well as fevers, increased cough productive of sputum. Was not feeling well yesterday evening after being in normal health. This am family had difficulty arousing her.       Per family and pulm clinic notes (patient of Dr. Gordon) she is on 4L O2 and tachypneic at baseline. She has extensive documentation regarding goals of care and advance directives, specifically no intubation and no chest compressions. The daughter is POA with documentation in the system and not only agreed but emphasized these directives. The patient was recently treated with a short course of azithromycin for presumed bronchitis in the past week and has completed therapy.      Workup in ED was notable for a elevated lactic acid of 3.8 which is pre-fluid bolus. UA was unremarkable and CXR is largely unchanged, however WCC is Elevated. She was placed on home bipap and critical care was consulted for sepsis and hypoxic respiratory failure. And pt deemed appropriate for floor based on goals of care. Flu+       Hospital Course:  1/2/2018: Admitted for respiratory failure suspected due to PNA vs flu. +Flu test in ED, started on Tamiflu. Pt required BiPAP overnight,  which was stop this AM. Pt with still with fever this early AM. Overall she appears fatigue, family at bedside, family is interested in hospice given her deterioration. I explain that we will treat her current symptoms and monitor. Pt may qualify for home hospice if her respiratory status improves. BIPAP QHS. OLIVIA on admission with some improvement    1/3/2018: improvement of respiratory status at this time, overall looks more comfortable, pt main complain is the cough. Afebrile. Hyperglycemia due to steroid. Will need to be on Tamiflu for 10 days total. Updated the daughter. Plan is for patient to be discharge home when stable, she has sitter. May transition to hospice at a later time (AIM?) daughter is in agreement with this plan. PT/OT eval for needs at home.      1/4/2018 This AM on evaluation, report shortness of breath, still with productive cough. She was on 4L NC at that time. I ordered more breathing treatment. At ~12pm, pt was getting a breathing treatment and went unresponsive at this time. CORE was call, pt was mildly arousal to sternal rub but did not follow command. She was placed on DNR, ABG was done that showed no sign of Hypercapnia or Hypoxia. Sat 97-99% on BiPAP. Lungs are still very tight, no wheezing noted. CXR done showed no drastic changes ie pnx, pleural effusion that might cause this. Glucose was WNL. Daughter at bedside, I had a long conversation with her, we will respect her wishes and will not purse aggressive measure like intubation. I suspect that she might have aspirated mucus (nursing and daughter report coughing during her neb treatment when this happen) during the breathing treatment leading to this respiratory failure given the fact that throughout this hospital stay we were battling her cough and secretions.     1/5: Pt was started PRN morphine and PRN ativan overnight which helped with her respiratory status. She is much more awake today, she is eat all her meals. Still tachypnic  on 4L NC. Initial discussion was for inpatient hospice but family contacted pt's pulmonologist. Dr Gordon who visited the patient.  From my conversation with him, he wants the patient to be discharge home with her usual medications. I explanied that yesterday she had a deterioration and that today she is doing better. He did not think inpatient hospice was needed. I discussed with patient POA, they are ok with taking her home today. D/C home with  AIM program.  PO steroid at 10mg per Dr Gordon recommendation, finish out tamiflu 2 days left, Pt POA request to finish out a course of abx, moxifloxacin. All issues were address with the patient POA, she has no needs at this time. Pt to be transport home via ambulance            Interval History: See above.    Review of Systems     Constitutional: Negative for chills and fever  Eyes: Negative for visual disturbance.   Respiratory: Positive for cough and shortness of breath.    Cardiovascular: Negative for chest pain and leg swelling.   Gastrointestinal: Negative for abdominal pain and diarrhea.   Neurological: Positive for weakness.        Objective:     Vital Signs (Most Recent):  Temp: 97.6 °F (36.4 °C) (01/05/18 1126)  Pulse: 83 (01/05/18 1136)  Resp: (!) 24 (01/05/18 1136)  BP: (!) 148/67 (01/05/18 1126)  SpO2: 96 % (01/05/18 1136) Vital Signs (24h Range):  Temp:  [96.5 °F (35.8 °C)-97.6 °F (36.4 °C)] 97.6 °F (36.4 °C)  Pulse:  [] 83  Resp:  [12-28] 24  SpO2:  [96 %-100 %] 96 %  BP: (133-197)/(60-82) 148/67     Weight: 66.7 kg (147 lb)  Body mass index is 31.81 kg/m².    Intake/Output Summary (Last 24 hours) at 01/05/18 1337  Last data filed at 01/04/18 1833   Gross per 24 hour   Intake              400 ml   Output                0 ml   Net              400 ml      Physical Exam    Constitutional: follows commands and able to answer some questions. She appears well-developed and well-nourished. Frailed Female  HEENT: EOMI, nl conjunctiva, no LAD, neck  supple  Cardiovascular: Normal rate, S1 normal and S2 normal.  Exam reveals no gallop and no friction rub.    No murmur heard.  Pulmonary/Chest: No accessory muscle usage. Tachypnea noted. No respiratory distress. She has no decreased breath sounds. She has wheezes. She has rales.   Neurological: No focal deficits.   Skin: Wound LLE. Dressing appears C/D/I.       MELD-Na score: 7 at 1/3/2018  5:33 AM  MELD score: 7 at 1/3/2018  5:33 AM  Calculated from:  Serum Creatinine: 1.1 mg/dL at 1/3/2018  5:33 AM  Serum Sodium: 136 mmol/L at 1/3/2018  5:33 AM  Total Bilirubin: 0.8 mg/dL (Rounded to 1) at 1/1/2018 10:16 AM  INR(ratio): 1.0 at 1/1/2018 10:16 AM  Age: 89 years    Significant Labs:  CBC:    Recent Labs  Lab 01/04/18  0445 01/04/18  1253   WBC 7.42 11.70   HGB 10.1* 10.0*   HCT 31.0* 30.1*    238     CMP:    Recent Labs  Lab 01/04/18  0445 01/04/18  1253   * 135*   K 4.4 4.6    105   CO2 23 24   * 171*   BUN 26* 26*   CREATININE 0.9 0.9   CALCIUM 7.3* 8.3*   PROT  --  5.7*   ALBUMIN  --  2.4*   BILITOT  --  0.5   ALKPHOS  --  36*   AST  --  57*   ALT  --  34   ANIONGAP 7* 6*   EGFRNONAA 56.9* 56.9*     PTINR:  No results for input(s): INR in the last 48 hours.       Assessment/Plan:      * Acute on chronic respiratory failure with hypoxia    - Hx of ILD who was admitted for influenza, on home oxygen 2-4L, Pt had acute decompensation on 1/4, CORE was call, pt was mildly arousal to sternal rub but did not follow command. ABG was done that showed no sign of Hypercapnia or Hypoxia. Sat 97-99% on BiPAP. Lungs are still very tight, no wheezing noted. CXR done showed no drastic changes ie pnx, pleural effusion that might cause this. After an hour on BIPAP, pt woke up. Long discussion with POA, we started pt on comfort care plan with PRN ativan and PRN morphine.    - 1/5: patient respiratory status is the same but mental status is better, able to tolerate diet  - Discharge home with home health  after discussion with Dr Gordon and POA.               Severe sepsis    On admission: T101, Hr128, Rr46, WBC 16k w/ LA 3.8. Pt much imrpoved on BIPAP and tolerating. DNR. Given IPF will cover broadly.   Suspect that her sepsis source is respiratory, pt with positive influenza A, still with fever on 1/2 AM  - started on 1/1 tamiflu for 5 days total  - she received broad spectrum abx: vanc, cefepime. Stop Vancomycin, 1/2, stop Cefepime 1/3  - with patient might have Virus-induced secondary bacterial infection, may need full course of abx, PO Avalox to complete 7 days total of abx          Influenza A    - Tested + in ED for Influenza A  - Start Tamiflu for 5 days total            ILD (interstitial lung disease)    Acute of on chronic hypoxemic respiratory failure due to ILD and Influenza  - resume 10mg of steroid  - Discuss with POA, she is to contact Dr Gordon team with any issue          Acute renal failure superimposed on stage 3 chronic kidney disease    Cr baseline 1.3, on admit was 2  Back to baseline          Wound of left leg    Has home health wound care. Wound care ordered here. Silvadiene ordered.           Paroxysmal atrial fibrillation    Continue xarelto, coreg 3.25 bid          Essential hypertension    - BP now elevated  - resume all home medications          Uncontrolled type 2 diabetes mellitus with stage 4 chronic kidney disease, with long-term current use of insulin    Given levemir 15u QHS and Novolog 4U TID WMI.  Hyperglycemia in setting of steroid use and restarting of diet  Resume home medication dose, pt with good appetite          VTE Risk Mitigation         Ordered     rivaroxaban tablet 15 mg  With dinner     Route:  Oral        01/05/18 1321        Dispo: home with home health, AIM program        James Jin MD  Department of Hospital Medicine   [facility]

## 2018-01-05 NOTE — PLAN OF CARE
Transportation scheduled with [facility] ([id number])  time within the hour.      Alexandra Wilcox LMSW

## 2018-01-05 NOTE — SUBJECTIVE & OBJECTIVE
Interval History: See above.    Review of Systems     Constitutional: Negative for chills and fever  Eyes: Negative for visual disturbance.   Respiratory: Positive for cough and shortness of breath.    Cardiovascular: Negative for chest pain and leg swelling.   Gastrointestinal: Negative for abdominal pain and diarrhea.   Neurological: Positive for weakness.        Objective:     Vital Signs (Most Recent):  Temp: 97.6 °F (36.4 °C) (01/05/18 1126)  Pulse: 83 (01/05/18 1136)  Resp: (!) 24 (01/05/18 1136)  BP: (!) 148/67 (01/05/18 1126)  SpO2: 96 % (01/05/18 1136) Vital Signs (24h Range):  Temp:  [96.5 °F (35.8 °C)-97.6 °F (36.4 °C)] 97.6 °F (36.4 °C)  Pulse:  [] 83  Resp:  [12-28] 24  SpO2:  [96 %-100 %] 96 %  BP: (133-197)/(60-82) 148/67     Weight: 66.7 kg (147 lb)  Body mass index is 31.81 kg/m².    Intake/Output Summary (Last 24 hours) at 01/05/18 1337  Last data filed at 01/04/18 1833   Gross per 24 hour   Intake              400 ml   Output                0 ml   Net              400 ml      Physical Exam    Constitutional: follows commands and able to answer some questions. She appears well-developed and well-nourished. Frailed Female  HEENT: EOMI, nl conjunctiva, no LAD, neck supple  Cardiovascular: Normal rate, S1 normal and S2 normal.  Exam reveals no gallop and no friction rub.    No murmur heard.  Pulmonary/Chest: No accessory muscle usage. Tachypnea noted. No respiratory distress. She has no decreased breath sounds. She has wheezes. She has rales.   Neurological: No focal deficits.   Skin: Wound LLE. Dressing appears C/D/I.       MELD-Na score: 7 at 1/3/2018  5:33 AM  MELD score: 7 at 1/3/2018  5:33 AM  Calculated from:  Serum Creatinine: 1.1 mg/dL at 1/3/2018  5:33 AM  Serum Sodium: 136 mmol/L at 1/3/2018  5:33 AM  Total Bilirubin: 0.8 mg/dL (Rounded to 1) at 1/1/2018 10:16 AM  INR(ratio): 1.0 at 1/1/2018 10:16 AM  Age: 89 years    Significant Labs:  CBC:    Recent Labs  Lab 01/04/18  0449  01/04/18  1253   WBC 7.42 11.70   HGB 10.1* 10.0*   HCT 31.0* 30.1*    238     CMP:    Recent Labs  Lab 01/04/18  0445 01/04/18  1253   * 135*   K 4.4 4.6    105   CO2 23 24   * 171*   BUN 26* 26*   CREATININE 0.9 0.9   CALCIUM 7.3* 8.3*   PROT  --  5.7*   ALBUMIN  --  2.4*   BILITOT  --  0.5   ALKPHOS  --  36*   AST  --  57*   ALT  --  34   ANIONGAP 7* 6*   EGFRNONAA 56.9* 56.9*     PTINR:  No results for input(s): INR in the last 48 hours.

## 2018-01-06 LAB
BACTERIA BLD CULT: NORMAL
BACTERIA BLD CULT: NORMAL

## 2018-01-06 NOTE — NURSING
Discharge orders reviewed and given to patient and patients care giver. Patient and patients care giver verbalized understanding. Patient awaiting transportation with [facility],

## 2018-01-08 ENCOUNTER — TELEPHONE (OUTPATIENT)
Dept: INTERNAL MEDICINE | Facility: CLINIC | Age: 83
End: 2018-01-08

## 2018-01-08 ENCOUNTER — TELEPHONE (OUTPATIENT)
Dept: PULMONOLOGY | Facility: CLINIC | Age: 83
End: 2018-01-08

## 2018-01-08 NOTE — PT/OT/SLP DISCHARGE
Occupational Therapy Discharge Summary    Jahaira Garcia  MRN: [id number]   Principal Problem: Acute on chronic respiratory failure with hypoxia      Patient Discharged from acute Occupational Therapy on 1/8/18.  Please refer to prior OT note dated 1/4/18 for functional status.    Assessment:      Patient has not met goals.    Objective:     GOALS:    Occupational Therapy Goals     Not on file          Multidisciplinary Problems (Resolved)        Problem: Occupational Therapy Goal    Goal Priority Disciplines Outcome Interventions   Occupational Therapy Goal   (Resolved)     OT, PT/OT Outcome(s) achieved    Description:  Goals to be met by: 1/11/18     Patient will increase functional independence with ADLs by performing:    UE Dressing with Moderate Assistance. - not met  LE Dressing with Maximum Assistance. - not met  Grooming while seated with Stand-by Assistance. - not met  Toileting from bedside commode with Moderate Assistance for hygiene and clothing management. - not met  Sitting at edge of bed x15 minutes with Minimal Assistance. - not met  Rolling to Bilateral with Stand-by Assistance. - not met  Supine to sit with Minimal Assistance. - not met  Stand pivot transfers with Moderate Assistance. - not met                       Reasons for Discontinuation of Therapy Services  Transfer to alternate level of care.      Plan:     Patient Discharged to: Home with Home Health Service    AYE Shaikh  1/8/2018

## 2018-01-08 NOTE — PLAN OF CARE
Problem: Occupational Therapy Goal  Goal: Occupational Therapy Goal  Goals to be met by: 1/11/18     Patient will increase functional independence with ADLs by performing:    UE Dressing with Moderate Assistance. - not met  LE Dressing with Maximum Assistance. - not met  Grooming while seated with Stand-by Assistance. - not met  Toileting from bedside commode with Moderate Assistance for hygiene and clothing management. - not met  Sitting at edge of bed x15 minutes with Minimal Assistance. - not met  Rolling to Bilateral with Stand-by Assistance. - not met  Supine to sit with Minimal Assistance. - not met  Stand pivot transfers with Moderate Assistance. - not met     Outcome: Outcome(s) achieved Date Met: 01/08/18  No goals met.  Hospital discharge.

## 2018-01-08 NOTE — TELEPHONE ENCOUNTER
Spoke to pt's daughter, Rowena. She stated that the pt was in the hospital for 1 week with the flu. She stated that they wanted to see if the pt had any VA benefits. They needs forms to be filled out.  I advised, per Dr. Estrada, that an office visit is needed for forms to be filled out.   She stated that the pt is too weak. She stated that she will call back in later to see if she might get her in in about a week.

## 2018-01-08 NOTE — TELEPHONE ENCOUNTER
----- Message from Poonam Allen sent at 1/8/2018 11:15 AM CST -----  Contact: Daughter Rowena [phone]  Requesting a call back in regards to VA Benefits paperwork needed to be filled. Please advise

## 2018-01-08 NOTE — TELEPHONE ENCOUNTER
----- Message from More Mathew sent at 1/8/2018 11:18 AM CST -----  Contact: Pt  [phone] or  [phone]  Evan / pt daughter calling only to Thank the Dr for coming to see the pt in the hospital , pt daughter also wants to Thank the nurse as well , call back number [phone]  Thanks,

## 2018-01-15 RX ORDER — PRAVASTATIN SODIUM 20 MG/1
20 TABLET ORAL DAILY
Qty: 90 TABLET | Refills: 0 | Status: SHIPPED | OUTPATIENT
Start: 2018-01-15 | End: 2018-04-17 | Stop reason: SDUPTHER

## 2018-01-17 ENCOUNTER — TELEPHONE (OUTPATIENT)
Dept: PULMONOLOGY | Facility: CLINIC | Age: 83
End: 2018-01-17

## 2018-01-17 NOTE — TELEPHONE ENCOUNTER
----- Message from More Mathew sent at 1/17/2018 11:44 AM CST -----  Contact: Pt  Daughter  Rowena [phone]  Papers was sent over for the Dr/Nurse to sign , calling to see if it's been received .  [employer] is requesting papers. Call back to verify [phone]  Thanks,

## 2018-01-17 NOTE — TELEPHONE ENCOUNTER
Rowena Ceron, Mrs Laroses daughter faxed [employer] forms for Homebound Status for Dr Gordon to sign so her mother can get some visits by nursing assistants to aid in her care at home. The signed form was faxed back to Mr Gardner office and Rowena let me know she received it and asked that I mail the original form to her mother. Taylor Platt LPN

## 2018-01-30 ENCOUNTER — TELEPHONE (OUTPATIENT)
Dept: INTERNAL MEDICINE | Facility: CLINIC | Age: 83
End: 2018-01-30

## 2018-01-30 NOTE — TELEPHONE ENCOUNTER
----- Message from Alexandra Correa sent at 1/30/2018  3:21 PM CST -----  Contact: Lorena/[facility]/[phone]  Type:Home Health(orders,updates,clarifications,etc)    Home Health Agency/Nurse: [facility]    Phone number: [phone]    Reason for call:    Comments: patient has a laceration on her leg (hit it on the wheelchair) and would like to get wound care orders.

## 2018-01-31 NOTE — TELEPHONE ENCOUNTER
Spoke to Lorena with [facility]. She stated that she was going to fax over orders to clean the pt's two small wounds with wound cleanser, apply silvadene, cover with gauze, then wrap with a kerlix and ace wrap.   I gave the verbal ok for this.  She will fax the order.

## 2018-03-12 DIAGNOSIS — J84.9 ILD (INTERSTITIAL LUNG DISEASE): ICD-10-CM

## 2018-03-12 DIAGNOSIS — J06.9 UPPER RESPIRATORY TRACT INFECTION, UNSPECIFIED TYPE: ICD-10-CM

## 2018-03-12 RX ORDER — PREDNISONE 10 MG/1
TABLET ORAL
Qty: 60 TABLET | Refills: 11 | Status: SHIPPED | OUTPATIENT
Start: 2018-03-12 | End: 2018-03-13 | Stop reason: ALTCHOICE

## 2018-03-12 NOTE — TELEPHONE ENCOUNTER
----- Message from Jayna Frost sent at 3/12/2018 10:22 AM CDT -----  Contact:   [facility] Fatoumata [phone]  Daughter calling in regards to Mother Medication needs a refill on predniSONE (DELTASONE) 10 MG tablet please call patient back to discuss .

## 2018-03-13 DIAGNOSIS — J84.9 ILD (INTERSTITIAL LUNG DISEASE): Primary | ICD-10-CM

## 2018-03-13 RX ORDER — PREDNISONE 5 MG/1
TABLET ORAL
Qty: 60 TABLET | Refills: 9 | Status: SHIPPED | OUTPATIENT
Start: 2018-03-13

## 2018-03-16 ENCOUNTER — TELEPHONE (OUTPATIENT)
Dept: ELECTROPHYSIOLOGY | Facility: CLINIC | Age: 83
End: 2018-03-16

## 2018-03-16 NOTE — TELEPHONE ENCOUNTER
Scheduled Remote ICD/Pacemaker transmission on 3/15/18 was not received.  Spoke to the patient on this afternoon who stated she was going to be going to the patient's home around 3:30 or so.  Patient's daughter Chula stated she was unable to take down the clinic telephone # and asked to be called back in an hour.  Informed Chula I would make every effort to call her back this afternoon, if unable I would call her back on Monday.  Patient's daughter verbalized understanding to all of the above.       3:45 pm  Attempted to contact the patient's daughter on this afternoon, however it went straight to voice mail.  I did leave a message on the voice mail with the contact # for the [facility].

## 2018-03-20 RX ORDER — CARVEDILOL 3.12 MG/1
3.12 TABLET ORAL 2 TIMES DAILY
Qty: 180 TABLET | Refills: 2 | Status: SHIPPED | OUTPATIENT
Start: 2018-03-20 | End: 2018-12-22 | Stop reason: SDUPTHER

## 2018-03-23 RX ORDER — BLOOD-GLUCOSE METER
EACH MISCELLANEOUS
Qty: 100 EACH | Refills: 4 | Status: SHIPPED | OUTPATIENT
Start: 2018-03-23 | End: 2018-10-11 | Stop reason: SDUPTHER

## 2018-03-27 ENCOUNTER — HOSPITAL ENCOUNTER (OUTPATIENT)
Dept: PULMONOLOGY | Facility: CLINIC | Age: 83
Discharge: HOME | End: 2018-03-27
Payer: MEDICARE

## 2018-03-27 ENCOUNTER — OFFICE VISIT (OUTPATIENT)
Dept: PULMONOLOGY | Facility: CLINIC | Age: 83
End: 2018-03-27
Payer: MEDICARE

## 2018-03-27 VITALS — HEIGHT: 61 IN | WEIGHT: 145 LBS | BODY MASS INDEX: 27.38 KG/M2 | OXYGEN SATURATION: 92 % | HEART RATE: 71 BPM

## 2018-03-27 DIAGNOSIS — J84.9 ILD (INTERSTITIAL LUNG DISEASE): ICD-10-CM

## 2018-03-27 DIAGNOSIS — J96.11 CHRONIC RESPIRATORY FAILURE WITH HYPOXIA: Primary | ICD-10-CM

## 2018-03-27 LAB
PRE FEV1 FVC: 90
PRE FEV1: 0.81
PRE FVC: 0.9
PREDICTED FEV1 FVC: 77
PREDICTED FEV1: 1.52
PREDICTED FVC: 2.09

## 2018-03-27 PROCEDURE — 94010 BREATHING CAPACITY TEST: CPT | Mod: PBBFAC | Performed by: INTERNAL MEDICINE

## 2018-03-27 PROCEDURE — 99213 OFFICE O/P EST LOW 20 MIN: CPT | Mod: PBBFAC,25 | Performed by: INTERNAL MEDICINE

## 2018-03-27 PROCEDURE — 99214 OFFICE O/P EST MOD 30 MIN: CPT | Mod: 25,S$PBB,, | Performed by: INTERNAL MEDICINE

## 2018-03-27 PROCEDURE — 94010 BREATHING CAPACITY TEST: CPT | Mod: 26,S$PBB,, | Performed by: INTERNAL MEDICINE

## 2018-03-27 PROCEDURE — 99999 PR PBB SHADOW E&M-EST. PATIENT-LVL III: CPT | Mod: PBBFAC,,, | Performed by: INTERNAL MEDICINE

## 2018-03-28 NOTE — PROGRESS NOTES
Subjective:       Patient ID: Jahaira Garcia is a 89 y.o. female.    Chief Complaint: Interstitial Lung Disease and Shortness of Breath    HPIDelightul lady that I have followed for years with moderate pulmonary fibrosis and respiratory failure on oxygen. She is also a patient of Dr. Agrawal for type II diabetes and has to use insulin since I initiated steroid treatment. Today's FVC is reduced at 0.90 liters 44% . She is not able to perform a DLCO test. Her Sa02@92%.       No flowsheet data found.]  Review of Systems   Constitutional: Negative.    HENT: Negative.    Eyes: Negative.    Respiratory: Negative.         Respiratory failure secondary to ILD   Cardiovascular: Negative.         Pacemaker for complete heart block   Genitourinary: Negative.    Endocrine: Type ii diabetes aggravated by steroids   Musculoskeletal: Negative.    Skin: Negative.    Gastrointestinal: Negative.    Neurological: Negative.    Psychiatric/Behavioral: Negative.        Objective:      Physical Exam   Constitutional: She is oriented to person, place, and time. She appears well-developed and well-nourished. No distress.   HENT:   Head: Normocephalic and atraumatic.   Right Ear: External ear normal.   Left Ear: External ear normal.   Nose: Nose normal.   Mouth/Throat: Oropharynx is clear and moist.   Eyes: Conjunctivae and EOM are normal. Pupils are equal, round, and reactive to light.   Neck: Normal range of motion. Neck supple. No JVD present. No thyromegaly present.   Cardiovascular: Normal rate, regular rhythm, normal heart sounds and intact distal pulses.  Exam reveals no gallop.    No murmur heard.  Pulmonary/Chest: Breath sounds normal. No stridor. No respiratory distress. She has no wheezes. She has no rales. She exhibits no tenderness.   Velcro rales at both lung bases.    Abdominal: Soft. Bowel sounds are normal. She exhibits no distension and no mass. There is no tenderness. There is no rebound and no guarding.  "  Musculoskeletal: Normal range of motion. She exhibits no edema.   Lymphadenopathy:     She has no cervical adenopathy.   Neurological: She is alert and oriented to person, place, and time. She has normal reflexes. She displays normal reflexes. No cranial nerve deficit.   Skin: Skin is warm and dry. No rash noted.   Psychiatric: She has a normal mood and affect. Her behavior is normal. Judgment and thought content normal.   Nursing note and vitals reviewed.          Assessment:       No diagnosis found.    Outpatient Encounter Prescriptions as of 3/27/2018   Medication Sig Dispense Refill    acetaminophen (TYLENOL) 325 MG tablet Take 1-2 tablets (325-650 mg total) by mouth nightly as needed for Pain.      albuterol (PROVENTIL) 2.5 mg /3 mL (0.083 %) nebulizer solution Take 3 mLs (2.5 mg total) by nebulization every 6 (six) hours as needed for Wheezing or Shortness of Breath. Rescue 90 each 11    albuterol-ipratropium 2.5mg-0.5mg/3mL (DUO-NEB) 0.5 mg-3 mg(2.5 mg base)/3 mL nebulizer solution Take 3 mLs by nebulization every evening. And every 6 hours as needed.  0    artificial tears (ISOPTO TEARS) 0.5 % ophthalmic solution Place 1 drop into both eyes as needed.      BD ULTRA-FINE CHARAN PEN NEEDLES 32 gauge x 5/32" Ndle USE ONE 3 TIMES DAILY 300 each 0    carvedilol (COREG) 3.125 MG tablet Take 1 tablet (3.125 mg total) by mouth 2 (two) times daily. 180 tablet 2    FLUZONE HIGH-DOSE 2017-18, PF, 180 mcg/0.5 mL vaccine       FOLIC ACID/MULTIVIT-MIN/LUTEIN (CENTRUM SILVER ORAL) Take 1 tablet by mouth once daily.      insulin lispro (HUMALOG KWIKPEN) 100 unit/mL InPn pen Inject 22 Units into the skin 3 (three) times daily. 3 Box 3    levalbuterol (XOPENEX) 0.63 mg/3 mL nebulizer solution USE ONE VIAL TWICE DAILY 504 mL 0    losartan (COZAAR) 50 MG tablet Take 1 tablet (50 mg total) by mouth 2 (two) times daily. 180 tablet 3    melatonin 1 mg Tab Take by mouth.      methyl salicylate-menthol (SALONPAS) 10-3 " % SprA Apply topically.      mupirocin (BACTROBAN) 2 % ointment Apply topically 3 (three) times daily. 30 g 0    ONETOUCH DELICA LANCETS 33 gauge Misc 1 lancet by Misc.(Non-Drug; Combo Route) route 3 (three) times daily. 100 each 4    ONETOUCH VERIO Strp USE ONE strip 3 TIMES DAILY 100 each 4    polyethylene glycol (GLYCOLAX) 17 gram PwPk Take 17 g by mouth 3 (three) times daily as needed. 100 packet 0    pravastatin (PRAVACHOL) 20 MG tablet Take 1 tablet (20 mg total) by mouth once daily. Needs an appointment 90 tablet 0    predniSONE (DELTASONE) 5 MG tablet 1 or 2 tabs daily 60 tablet 9    psyllium (METAMUCIL) packet Take 1 packet by mouth once daily.  12    rivaroxaban (XARELTO) 15 mg Tab Take 1 tablet (15 mg total) by mouth daily with dinner or evening meal. 90 tablet 3    silver sulfADIAZINE 1% (SILVADENE) 1 % cream Apply topically once daily. 50 g 0     No facility-administered encounter medications on file as of 3/27/2018.      No orders of the defined types were placed in this encounter.    Plan:       Compensated respiratory failure from ILD., Remarkably resilient lady

## 2018-04-19 RX ORDER — PRAVASTATIN SODIUM 20 MG/1
TABLET ORAL
Qty: 90 TABLET | Refills: 0 | Status: SHIPPED | OUTPATIENT
Start: 2018-04-19 | End: 2018-07-13 | Stop reason: SDUPTHER

## 2018-05-03 ENCOUNTER — TELEPHONE (OUTPATIENT)
Dept: ENDOCRINOLOGY | Facility: CLINIC | Age: 83
End: 2018-05-03

## 2018-05-03 NOTE — TELEPHONE ENCOUNTER
Talk to pt daughter she claims Dr Agrawal went in to se her at her pulmonary appt with another doctor. That was in march she wondering if she need to keep her appt for may since it was two months ago. I told her appt says follow up. He probably want to have a follow up with her.

## 2018-05-03 NOTE — TELEPHONE ENCOUNTER
----- Message from Codi Yarbrough sent at 5/3/2018  8:36 AM CDT -----  Contact: Rowena / Daughter [phone]  PT saw Nghia at her appointment with [place]. Rowena wants to know if she needs to come to appointment on 5/7.

## 2018-05-07 ENCOUNTER — CLINICAL SUPPORT (OUTPATIENT)
Dept: OPHTHALMOLOGY | Facility: CLINIC | Age: 83
End: 2018-05-07
Attending: INTERNAL MEDICINE
Payer: MEDICARE

## 2018-05-07 ENCOUNTER — OFFICE VISIT (OUTPATIENT)
Dept: ENDOCRINOLOGY | Facility: CLINIC | Age: 83
End: 2018-05-07
Payer: MEDICARE

## 2018-05-07 ENCOUNTER — TELEPHONE (OUTPATIENT)
Dept: INTERNAL MEDICINE | Facility: CLINIC | Age: 83
End: 2018-05-07

## 2018-05-07 ENCOUNTER — LAB VISIT (OUTPATIENT)
Dept: LAB | Facility: HOSPITAL | Age: 83
End: 2018-05-07
Attending: INTERNAL MEDICINE
Payer: MEDICARE

## 2018-05-07 VITALS
WEIGHT: 145 LBS | HEIGHT: 61 IN | HEART RATE: 84 BPM | BODY MASS INDEX: 27.38 KG/M2 | DIASTOLIC BLOOD PRESSURE: 80 MMHG | SYSTOLIC BLOOD PRESSURE: 130 MMHG

## 2018-05-07 DIAGNOSIS — T38.0X5D ADVERSE EFFECT OF CORTICOSTEROIDS, SUBSEQUENT ENCOUNTER: ICD-10-CM

## 2018-05-07 DIAGNOSIS — Z79.4 TYPE 2 DIABETES MELLITUS WITH HYPERGLYCEMIA, WITH LONG-TERM CURRENT USE OF INSULIN: Primary | Chronic | ICD-10-CM

## 2018-05-07 DIAGNOSIS — E55.9 VITAMIN D DEFICIENCY: ICD-10-CM

## 2018-05-07 DIAGNOSIS — E11.65 TYPE 2 DIABETES MELLITUS WITH HYPERGLYCEMIA, WITH LONG-TERM CURRENT USE OF INSULIN: Chronic | ICD-10-CM

## 2018-05-07 DIAGNOSIS — E11.65 TYPE 2 DIABETES MELLITUS WITH HYPERGLYCEMIA, WITH LONG-TERM CURRENT USE OF INSULIN: Primary | Chronic | ICD-10-CM

## 2018-05-07 DIAGNOSIS — Z79.4 TYPE 2 DIABETES MELLITUS WITH HYPERGLYCEMIA, WITH LONG-TERM CURRENT USE OF INSULIN: Chronic | ICD-10-CM

## 2018-05-07 LAB
25(OH)D3+25(OH)D2 SERPL-MCNC: 39 NG/ML
ALBUMIN SERPL BCP-MCNC: 3 G/DL
ALP SERPL-CCNC: 43 U/L
ALT SERPL W/O P-5'-P-CCNC: 18 U/L
ANION GAP SERPL CALC-SCNC: 12 MMOL/L
AST SERPL-CCNC: 25 U/L
BASOPHILS # BLD AUTO: 0.01 K/UL
BASOPHILS NFR BLD: 0.1 %
BILIRUB SERPL-MCNC: 0.5 MG/DL
BUN SERPL-MCNC: 36 MG/DL
CALCIUM SERPL-MCNC: 8.9 MG/DL
CHLORIDE SERPL-SCNC: 99 MMOL/L
CO2 SERPL-SCNC: 24 MMOL/L
CREAT SERPL-MCNC: 1.5 MG/DL
DIFFERENTIAL METHOD: ABNORMAL
EOSINOPHIL # BLD AUTO: 0 K/UL
EOSINOPHIL NFR BLD: 0.1 %
ERYTHROCYTE [DISTWIDTH] IN BLOOD BY AUTOMATED COUNT: 14 %
ERYTHROCYTE [SEDIMENTATION RATE] IN BLOOD BY WESTERGREN METHOD: 56 MM/HR
EST. GFR  (AFRICAN AMERICAN): 35.4 ML/MIN/1.73 M^2
EST. GFR  (NON AFRICAN AMERICAN): 30.7 ML/MIN/1.73 M^2
FERRITIN SERPL-MCNC: 70 NG/ML
GLUCOSE SERPL-MCNC: 446 MG/DL
HCT VFR BLD AUTO: 28.7 %
HGB BLD-MCNC: 8.7 G/DL
IMM GRANULOCYTES # BLD AUTO: 0.04 K/UL
IMM GRANULOCYTES NFR BLD AUTO: 0.5 %
LYMPHOCYTES # BLD AUTO: 1.2 K/UL
LYMPHOCYTES NFR BLD: 13.5 %
MCH RBC QN AUTO: 31.3 PG
MCHC RBC AUTO-ENTMCNC: 30.3 G/DL
MCV RBC AUTO: 103 FL
MONOCYTES # BLD AUTO: 0.3 K/UL
MONOCYTES NFR BLD: 3.9 %
NEUTROPHILS # BLD AUTO: 7.2 K/UL
NEUTROPHILS NFR BLD: 81.9 %
NRBC BLD-RTO: 1 /100 WBC
PLATELET # BLD AUTO: 316 K/UL
PMV BLD AUTO: 9.3 FL
POTASSIUM SERPL-SCNC: 5.2 MMOL/L
PROT SERPL-MCNC: 6.1 G/DL
RBC # BLD AUTO: 2.78 M/UL
SODIUM SERPL-SCNC: 135 MMOL/L
TSH SERPL DL<=0.005 MIU/L-ACNC: 1.33 UIU/ML
WBC # BLD AUTO: 8.79 K/UL

## 2018-05-07 PROCEDURE — 99214 OFFICE O/P EST MOD 30 MIN: CPT | Mod: S$PBB,,, | Performed by: INTERNAL MEDICINE

## 2018-05-07 PROCEDURE — 92250 FUNDUS PHOTOGRAPHY W/I&R: CPT | Mod: 50,PBBFAC

## 2018-05-07 PROCEDURE — 99999 PR PBB SHADOW E&M-EST. PATIENT-LVL I: CPT | Mod: PBBFAC,,,

## 2018-05-07 PROCEDURE — 36415 COLL VENOUS BLD VENIPUNCTURE: CPT

## 2018-05-07 PROCEDURE — 99213 OFFICE O/P EST LOW 20 MIN: CPT | Mod: PBBFAC,25 | Performed by: INTERNAL MEDICINE

## 2018-05-07 PROCEDURE — 82306 VITAMIN D 25 HYDROXY: CPT

## 2018-05-07 PROCEDURE — 85025 COMPLETE CBC W/AUTO DIFF WBC: CPT

## 2018-05-07 PROCEDURE — 85651 RBC SED RATE NONAUTOMATED: CPT

## 2018-05-07 PROCEDURE — 84443 ASSAY THYROID STIM HORMONE: CPT

## 2018-05-07 PROCEDURE — 80053 COMPREHEN METABOLIC PANEL: CPT

## 2018-05-07 PROCEDURE — 82728 ASSAY OF FERRITIN: CPT

## 2018-05-07 PROCEDURE — 99999 PR PBB SHADOW E&M-EST. PATIENT-LVL III: CPT | Mod: PBBFAC,,, | Performed by: INTERNAL MEDICINE

## 2018-05-07 PROCEDURE — 99211 OFF/OP EST MAY X REQ PHY/QHP: CPT | Mod: PBBFAC,27,25

## 2018-05-07 PROCEDURE — 92250 FUNDUS PHOTOGRAPHY W/I&R: CPT | Mod: 26,S$PBB,, | Performed by: OPHTHALMOLOGY

## 2018-05-07 PROCEDURE — 83036 HEMOGLOBIN GLYCOSYLATED A1C: CPT

## 2018-05-07 NOTE — TELEPHONE ENCOUNTER
----- Message from Mali Monroe sent at 5/7/2018  8:55 AM CDT -----  Contact: Rowena/Daughter/[phone]  Pt had a fall and has major wounds on her arms. She would like to know if she should stop the pt from taking rivaroxaban (XARELTO) 15 mg Tab. Also wants to know if she can get orders for family home care. Please advise.      Thanks

## 2018-05-07 NOTE — PROGRESS NOTES
Subjective:       Patient ID: Jahaira Garcia is a 89 y.o. female.    Chief Complaint: Diabetes    Diabetes   Pertinent negatives for hypoglycemia include no dizziness or headaches. Associated symptoms include fatigue and weakness. Pertinent negatives for diabetes include no chest pain.      Pt is a 85 yo CF here to follow up for T2DM. PMH: HTN, HLD, ILD on prednisone 10mg BID. Pt had acute bronchitis that required hospitalization in jan-February and took abx x 2weeks. Still requiring home O2 but no worsening of PFTs per pt. No SOB, no CP. Pt reports deconditioning since hospitalization, and now using a walker, which is new for her.  Now on prednisone 5 mg BID because of resp problems. Lately sugars high in 200's.  Checks BS regularly, FBS ~170s, bedtime BS 89. Pt had 3-4 readings in 50-60s during acute illness, but no recent hypoglycemic sx. Currently  On and Humalog 18-22 units before each meal.  .   Also c/o Both feet swelling x 1 week, without PND or SOB, has 1 pillow orthopnea.   Glocose at home .  Good appetite, lost about 7lbs due to acute bronchitis   No hypoglycemia.  Review of Systems   Constitutional: Positive for activity change and fatigue. Negative for fever.        Generalized weakness   HENT: Positive for voice change.    Eyes: Negative for pain, redness and visual disturbance.   Respiratory: Positive for cough, shortness of breath and wheezing.    Cardiovascular: Positive for leg swelling. Negative for chest pain and palpitations.   Gastrointestinal: Negative for abdominal pain, blood in stool, constipation, diarrhea, nausea and vomiting.   Endocrine: Positive for cold intolerance. Negative for heat intolerance.   Genitourinary: Positive for enuresis. Negative for dysuria.   Musculoskeletal:        Right arm pain since fall 3 days ago.   Neurological: Positive for weakness and light-headedness. Negative for dizziness, syncope and headaches.       Objective:      Physical Exam    Physical Exam    Constitutional: She is oriented to person, place, and time. Vital signs are normal. No distress.   She is on O2   HENT:   Mouth/Throat: Oropharynx is clear and moist.   Eyes: EOM are normal.   Neck: No thyromegaly present.   Cardiovascular: Normal rate, regular rhythm, normal heart sounds and intact distal pulses. Exam reveals no gallop and no friction rub.   No murmur heard.  Pulmonary/Chest: Effort normal and breath sounds normal. No respiratory distress. She has no wheezes. She has no rales.   Coarse BS noted  Abdominal: She exhibits no distension. There is no tenderness. There is no rebound and no guarding.   Genitourinary: Vaginal discharge:   Musculoskeletal: Normal range of motion. She exhibits no edema.   Neurological: She is oriented to person, place, and time. She has normal reflexes. No cranial nerve deficit.   Skin: No rash noted.   Psychiatric: She has a normal mood and affect.     Results for orders placed or performed during the hospital encounter of 01/01/18   Blood culture x two cultures. Draw prior to antibiotics.   Result Value Ref Range    Blood Culture, Routine No growth after 5 days.    Blood culture x two cultures. Draw prior to antibiotics.   Result Value Ref Range    Blood Culture, Routine No growth after 5 days.    Brain natriuretic peptide   Result Value Ref Range    BNP 1,481 (H) 0 - 99 pg/mL   CBC auto differential   Result Value Ref Range    WBC 16.24 (H) 3.90 - 12.70 K/uL    RBC 4.05 4.00 - 5.40 M/uL    Hemoglobin 12.3 12.0 - 16.0 g/dL    Hematocrit 38.1 37.0 - 48.5 %    MCV 94 82 - 98 fL    MCH 30.4 27.0 - 31.0 pg    MCHC 32.3 32.0 - 36.0 g/dL    RDW 16.7 (H) 11.5 - 14.5 %    Platelets 276 150 - 350 K/uL    MPV 9.5 9.2 - 12.9 fL    Immature Granulocytes 1.8 (H) 0.0 - 0.5 %    Gran # (ANC) 8.5 (H) 1.8 - 7.7 K/uL    Immature Grans (Abs) 0.29 (H) 0.00 - 0.04 K/uL    Lymph # 5.3 (H) 1.0 - 4.8 K/uL    Mono # 2.1 (H) 0.3 - 1.0 K/uL    Eos # 0.0 0.0 - 0.5 K/uL    Baso # 0.06 0.00 - 0.20  K/uL    nRBC 1 (A) 0 /100 WBC    Gran% 51.9 38.0 - 73.0 %    Lymph% 32.7 18.0 - 48.0 %    Mono% 13.1 4.0 - 15.0 %    Eosinophil% 0.1 0.0 - 8.0 %    Basophil% 0.4 0.0 - 1.9 %    Aniso Slight     Poik Slight     Poly Occasional     Hypo Occasional     Ovalocytes Occasional     Differential Method Automated    Comprehensive metabolic panel   Result Value Ref Range    Sodium 133 (L) 136 - 145 mmol/L    Potassium 5.2 (H) 3.5 - 5.1 mmol/L    Chloride 97 95 - 110 mmol/L    CO2 21 (L) 23 - 29 mmol/L    Glucose 299 (H) 70 - 110 mg/dL    BUN, Bld 32 (H) 8 - 23 mg/dL    Creatinine 2.0 (H) 0.5 - 1.4 mg/dL    Calcium 9.2 8.7 - 10.5 mg/dL    Total Protein 6.7 6.0 - 8.4 g/dL    Albumin 3.0 (L) 3.5 - 5.2 g/dL    Total Bilirubin 0.8 0.1 - 1.0 mg/dL    Alkaline Phosphatase 49 (L) 55 - 135 U/L    AST 42 (H) 10 - 40 U/L    ALT 25 10 - 44 U/L    Anion Gap 15 8 - 16 mmol/L    eGFR if African American 25.0 (A) >60 mL/min/1.73 m^2    eGFR if non  21.7 (A) >60 mL/min/1.73 m^2   Lactic acid, plasma #1   Result Value Ref Range    Lactate (Lactic Acid) 3.8 (HH) 0.5 - 2.2 mmol/L   Lactic acid, plasma #2   Result Value Ref Range    Lactate (Lactic Acid) 2.0 0.5 - 2.2 mmol/L   Lipase   Result Value Ref Range    Lipase 29 4 - 60 U/L   Magnesium   Result Value Ref Range    Magnesium 1.7 1.6 - 2.6 mg/dL   Phosphorus   Result Value Ref Range    Phosphorus 2.4 (L) 2.7 - 4.5 mg/dL   Protime-INR   Result Value Ref Range    Prothrombin Time 10.5 9.0 - 12.5 sec    INR 1.0 0.8 - 1.2   Procalcitonin   Result Value Ref Range    Procalcitonin 0.44 (H) <0.25 ng/mL   Troponin I   Result Value Ref Range    Troponin I 0.230 (H) 0.000 - 0.026 ng/mL   TSH   Result Value Ref Range    TSH 4.617 (H) 0.400 - 4.000 uIU/mL   Urinalysis, Reflex to Urine Culture Urine, Catheterized   Result Value Ref Range    Specimen UA Urine, Catheterized     Color, UA Yellow Yellow, Straw, Marcia    Appearance, UA Hazy (A) Clear    pH, UA 5.0 5.0 - 8.0    Specific  Gravity, UA 1.020 1.005 - 1.030    Protein, UA 2+ (A) Negative    Glucose, UA Negative Negative    Ketones, UA Trace (A) Negative    Bilirubin (UA) Negative Negative    Occult Blood UA Negative Negative    Nitrite, UA Negative Negative    Urobilinogen, UA Negative <2.0 EU/dL    Leukocytes, UA Negative Negative   Influenza antigen   Result Value Ref Range    Influenza A Ag, EIA Positive (A) Negative    Influenza B Ag, EIA Negative Negative    Flu A & B Source NP    T4, free   Result Value Ref Range    Free T4 0.91 0.71 - 1.51 ng/dL   Urinalysis Microscopic   Result Value Ref Range    RBC, UA 1 0 - 4 /hpf    WBC, UA 0 0 - 5 /hpf    Bacteria, UA Many (A) None-Occ /hpf    Hyaline Casts, UA 1 0-1/lpf /lpf    Microscopic Comment SEE COMMENT    Lactic acid, plasma #3   Result Value Ref Range    Lactate (Lactic Acid) 1.9 0.5 - 2.2 mmol/L   CBC auto differential   Result Value Ref Range    WBC 11.71 3.90 - 12.70 K/uL    RBC 3.73 (L) 4.00 - 5.40 M/uL    Hemoglobin 11.4 (L) 12.0 - 16.0 g/dL    Hematocrit 34.9 (L) 37.0 - 48.5 %    MCV 94 82 - 98 fL    MCH 30.6 27.0 - 31.0 pg    MCHC 32.7 32.0 - 36.0 g/dL    RDW 17.2 (H) 11.5 - 14.5 %    Platelets 218 150 - 350 K/uL    MPV 9.2 9.2 - 12.9 fL    Immature Granulocytes 1.2 (H) 0.0 - 0.5 %    Gran # (ANC) 7.8 (H) 1.8 - 7.7 K/uL    Immature Grans (Abs) 0.14 (H) 0.00 - 0.04 K/uL    Lymph # 2.5 1.0 - 4.8 K/uL    Mono # 1.2 (H) 0.3 - 1.0 K/uL    Eos # 0.1 0.0 - 0.5 K/uL    Baso # 0.04 0.00 - 0.20 K/uL    nRBC 0 0 /100 WBC    Gran% 67.0 38.0 - 73.0 %    Lymph% 21.1 18.0 - 48.0 %    Mono% 10.0 4.0 - 15.0 %    Eosinophil% 0.4 0.0 - 8.0 %    Basophil% 0.3 0.0 - 1.9 %    Differential Method Automated    Magnesium   Result Value Ref Range    Magnesium 1.7 1.6 - 2.6 mg/dL   Basic metabolic panel   Result Value Ref Range    Sodium 135 (L) 136 - 145 mmol/L    Potassium 4.5 3.5 - 5.1 mmol/L    Chloride 104 95 - 110 mmol/L    CO2 20 (L) 23 - 29 mmol/L    Glucose 160 (H) 70 - 110 mg/dL    BUN, Bld  31 (H) 8 - 23 mg/dL    Creatinine 1.5 (H) 0.5 - 1.4 mg/dL    Calcium 8.0 (L) 8.7 - 10.5 mg/dL    Anion Gap 11 8 - 16 mmol/L    eGFR if African American 35.4 (A) >60 mL/min/1.73 m^2    eGFR if non  30.7 (A) >60 mL/min/1.73 m^2   CBC auto differential   Result Value Ref Range    WBC 9.00 3.90 - 12.70 K/uL    RBC 3.15 (L) 4.00 - 5.40 M/uL    Hemoglobin 9.5 (L) 12.0 - 16.0 g/dL    Hematocrit 28.9 (L) 37.0 - 48.5 %    MCV 92 82 - 98 fL    MCH 30.2 27.0 - 31.0 pg    MCHC 32.9 32.0 - 36.0 g/dL    RDW 17.0 (H) 11.5 - 14.5 %    Platelets 210 150 - 350 K/uL    MPV 9.5 9.2 - 12.9 fL    Immature Granulocytes 0.8 (H) 0.0 - 0.5 %    Gran # (ANC) 7.1 1.8 - 7.7 K/uL    Immature Grans (Abs) 0.07 (H) 0.00 - 0.04 K/uL    Lymph # 1.2 1.0 - 4.8 K/uL    Mono # 0.6 0.3 - 1.0 K/uL    Eos # 0.0 0.0 - 0.5 K/uL    Baso # 0.01 0.00 - 0.20 K/uL    nRBC 0 0 /100 WBC    Gran% 78.5 (H) 38.0 - 73.0 %    Lymph% 13.8 (L) 18.0 - 48.0 %    Mono% 6.8 4.0 - 15.0 %    Eosinophil% 0.0 0.0 - 8.0 %    Basophil% 0.1 0.0 - 1.9 %    Differential Method Automated    Magnesium   Result Value Ref Range    Magnesium 1.6 1.6 - 2.6 mg/dL   Basic metabolic panel   Result Value Ref Range    Sodium 136 136 - 145 mmol/L    Potassium 4.5 3.5 - 5.1 mmol/L    Chloride 105 95 - 110 mmol/L    CO2 22 (L) 23 - 29 mmol/L    Glucose 217 (H) 70 - 110 mg/dL    BUN, Bld 27 (H) 8 - 23 mg/dL    Creatinine 1.1 0.5 - 1.4 mg/dL    Calcium 7.7 (L) 8.7 - 10.5 mg/dL    Anion Gap 9 8 - 16 mmol/L    eGFR if African American 51.4 (A) >60 mL/min/1.73 m^2    eGFR if non  44.6 (A) >60 mL/min/1.73 m^2   Lactic acid, plasma   Result Value Ref Range    Lactate (Lactic Acid) 1.2 0.5 - 2.2 mmol/L   CBC auto differential   Result Value Ref Range    WBC 7.42 3.90 - 12.70 K/uL    RBC 3.25 (L) 4.00 - 5.40 M/uL    Hemoglobin 10.1 (L) 12.0 - 16.0 g/dL    Hematocrit 31.0 (L) 37.0 - 48.5 %    MCV 95 82 - 98 fL    MCH 31.1 (H) 27.0 - 31.0 pg    MCHC 32.6 32.0 - 36.0 g/dL    RDW  16.7 (H) 11.5 - 14.5 %    Platelets 229 150 - 350 K/uL    MPV 9.3 9.2 - 12.9 fL    Immature Granulocytes 0.7 (H) 0.0 - 0.5 %    Gran # (ANC) 5.8 1.8 - 7.7 K/uL    Immature Grans (Abs) 0.05 (H) 0.00 - 0.04 K/uL    Lymph # 1.1 1.0 - 4.8 K/uL    Mono # 0.5 0.3 - 1.0 K/uL    Eos # 0.0 0.0 - 0.5 K/uL    Baso # 0.01 0.00 - 0.20 K/uL    nRBC 0 0 /100 WBC    Gran% 77.8 (H) 38.0 - 73.0 %    Lymph% 15.1 (L) 18.0 - 48.0 %    Mono% 6.3 4.0 - 15.0 %    Eosinophil% 0.0 0.0 - 8.0 %    Basophil% 0.1 0.0 - 1.9 %    Differential Method Automated    Magnesium   Result Value Ref Range    Magnesium 2.2 1.6 - 2.6 mg/dL   Basic metabolic panel   Result Value Ref Range    Sodium 135 (L) 136 - 145 mmol/L    Potassium 4.4 3.5 - 5.1 mmol/L    Chloride 105 95 - 110 mmol/L    CO2 23 23 - 29 mmol/L    Glucose 152 (H) 70 - 110 mg/dL    BUN, Bld 26 (H) 8 - 23 mg/dL    Creatinine 0.9 0.5 - 1.4 mg/dL    Calcium 7.3 (L) 8.7 - 10.5 mg/dL    Anion Gap 7 (L) 8 - 16 mmol/L    eGFR if African American >60.0 >60 mL/min/1.73 m^2    eGFR if non  56.9 (A) >60 mL/min/1.73 m^2   CBC auto differential   Result Value Ref Range    WBC 11.70 3.90 - 12.70 K/uL    RBC 3.25 (L) 4.00 - 5.40 M/uL    Hemoglobin 10.0 (L) 12.0 - 16.0 g/dL    Hematocrit 30.1 (L) 37.0 - 48.5 %    MCV 93 82 - 98 fL    MCH 30.8 27.0 - 31.0 pg    MCHC 33.2 32.0 - 36.0 g/dL    RDW 16.8 (H) 11.5 - 14.5 %    Platelets 238 150 - 350 K/uL    MPV 9.1 (L) 9.2 - 12.9 fL    Immature Granulocytes 0.7 (H) 0.0 - 0.5 %    Gran # (ANC) 10.2 (H) 1.8 - 7.7 K/uL    Immature Grans (Abs) 0.08 (H) 0.00 - 0.04 K/uL    Lymph # 0.8 (L) 1.0 - 4.8 K/uL    Mono # 0.6 0.3 - 1.0 K/uL    Eos # 0.0 0.0 - 0.5 K/uL    Baso # 0.01 0.00 - 0.20 K/uL    nRBC 0 0 /100 WBC    Gran% 87.3 (H) 38.0 - 73.0 %    Lymph% 7.1 (L) 18.0 - 48.0 %    Mono% 4.8 4.0 - 15.0 %    Eosinophil% 0.0 0.0 - 8.0 %    Basophil% 0.1 0.0 - 1.9 %    Differential Method Automated    Comprehensive metabolic panel   Result Value Ref Range     Sodium 135 (L) 136 - 145 mmol/L    Potassium 4.6 3.5 - 5.1 mmol/L    Chloride 105 95 - 110 mmol/L    CO2 24 23 - 29 mmol/L    Glucose 171 (H) 70 - 110 mg/dL    BUN, Bld 26 (H) 8 - 23 mg/dL    Creatinine 0.9 0.5 - 1.4 mg/dL    Calcium 8.3 (L) 8.7 - 10.5 mg/dL    Total Protein 5.7 (L) 6.0 - 8.4 g/dL    Albumin 2.4 (L) 3.5 - 5.2 g/dL    Total Bilirubin 0.5 0.1 - 1.0 mg/dL    Alkaline Phosphatase 36 (L) 55 - 135 U/L    AST 57 (H) 10 - 40 U/L    ALT 34 10 - 44 U/L    Anion Gap 6 (L) 8 - 16 mmol/L    eGFR if African American >60.0 >60 mL/min/1.73 m^2    eGFR if non  56.9 (A) >60 mL/min/1.73 m^2   Lactic acid, plasma   Result Value Ref Range    Lactate (Lactic Acid) 1.2 0.5 - 2.2 mmol/L   Magnesium   Result Value Ref Range    Magnesium 1.8 1.6 - 2.6 mg/dL   Phosphorus   Result Value Ref Range    Phosphorus 1.8 (L) 2.7 - 4.5 mg/dL   Troponin I   Result Value Ref Range    Troponin I 0.062 (H) 0.000 - 0.026 ng/mL   Type & Screen   Result Value Ref Range    Group & Rh A POS     Indirect [place] NEG    POCT glucose   Result Value Ref Range    POCT Glucose 277 (H) 70 - 110 mg/dL   ISTAT PROCEDURE   Result Value Ref Range    POC PH 7.321 (L) 7.35 - 7.45    POC PCO2 46.8 (H) 35 - 45 mmHg    POC PO2 20 (LL) 40 - 60 mmHg    POC HCO3 24.2 24 - 28 mmol/L    POC BE -2 -2 to 2 mmol/L    POC SATURATED O2 29 (L) 95 - 100 %    POC TCO2 26 24 - 29 mmol/L    Rate 14     Sample VENOUS     Site Other     Allens Test N/A     DelSys CPAP/BiPAP     Mode BiPAP     FiO2 40     Spont Rate 33     Min Vol 14     Sp02 99     IP 10     EP 5    POCT glucose   Result Value Ref Range    POCT Glucose 294 (H) 70 - 110 mg/dL   POCT glucose   Result Value Ref Range    POCT Glucose 223 (H) 70 - 110 mg/dL   POCT glucose   Result Value Ref Range    POCT Glucose 151 (H) 70 - 110 mg/dL   POCT glucose   Result Value Ref Range    POCT Glucose 220 (H) 70 - 110 mg/dL   POCT glucose   Result Value Ref Range    POCT Glucose 243 (H) 70 - 110 mg/dL   POCT  glucose   Result Value Ref Range    POCT Glucose 311 (H) 70 - 110 mg/dL   POCT glucose   Result Value Ref Range    POCT Glucose 208 (H) 70 - 110 mg/dL   POCT glucose   Result Value Ref Range    POCT Glucose 150 (H) 70 - 110 mg/dL   POCT glucose   Result Value Ref Range    POCT Glucose 262 (H) 70 - 110 mg/dL   POCT glucose   Result Value Ref Range    POCT Glucose 284 (H) 70 - 110 mg/dL   POCT glucose   Result Value Ref Range    POCT Glucose 146 (H) 70 - 110 mg/dL   ISTAT PROCEDURE   Result Value Ref Range    POC PH 7.353 7.35 - 7.45    POC PCO2 43.1 35 - 45 mmHg    POC PO2 99 80 - 100 mmHg    POC HCO3 23.9 (L) 24 - 28 mmol/L    POC BE -2 -2 to 2 mmol/L    POC SATURATED O2 97 95 - 100 %    POC TCO2 25 23 - 27 mmol/L    Sample ARTERIAL     Site RR     Allens Test Pass     DelSys CPAP/BiPAP     Mode BiPAP     IP 13     EP 5    POCT glucose   Result Value Ref Range    POCT Glucose 182 (H) 70 - 110 mg/dL   POCT glucose   Result Value Ref Range    POCT Glucose 224 (H) 70 - 110 mg/dL   POCT glucose   Result Value Ref Range    POCT Glucose 214 (H) 70 - 110 mg/dL   POCT glucose   Result Value Ref Range    POCT Glucose 177 (H) 70 - 110 mg/dL   POCT glucose   Result Value Ref Range    POCT Glucose 226 (H) 70 - 110 mg/dL     Assessment:       Currently taking prednisone 5 mg daily  Plan:       Jahaira was seen today for diabetes mellitus.    Diagnoses and associated orders for this visit:    Diabetes mellitus, type 2  A1c 7.6%, controlled  Decrease metfromin from 750 mg  increase Humalog 18-18-18 plus correction.    BP at goal, on ARB  LDL at goal  ophtho UTD  UMAB 48 on ARB   -     ;   - Hemoglobin A1c; today  - Basic metabolic panel; Future    Influenza  Pneumococcal vaccine 23.

## 2018-05-07 NOTE — TELEPHONE ENCOUNTER
Spoke to pt's daughter, Rowena. I advised that the xarelto is prescribed by the pt's arrhythmia specialist, Dr. Ann. I advised that she contact him for any adjustments to this medication.  Medication list reviewed. No other blood thinners noted.  Pt scheduled to see Dr. Estrada on 5/8/18 at 9:40 AM to eval skin tears and discuss ordering HH.

## 2018-05-08 ENCOUNTER — OFFICE VISIT (OUTPATIENT)
Dept: INTERNAL MEDICINE | Facility: CLINIC | Age: 83
End: 2018-05-08
Payer: MEDICARE

## 2018-05-08 ENCOUNTER — TELEPHONE (OUTPATIENT)
Dept: ENDOCRINOLOGY | Facility: CLINIC | Age: 83
End: 2018-05-08

## 2018-05-08 VITALS
HEIGHT: 60 IN | TEMPERATURE: 98 F | RESPIRATION RATE: 16 BRPM | HEART RATE: 83 BPM | OXYGEN SATURATION: 89 % | BODY MASS INDEX: 29.04 KG/M2 | DIASTOLIC BLOOD PRESSURE: 62 MMHG | SYSTOLIC BLOOD PRESSURE: 138 MMHG | WEIGHT: 147.94 LBS

## 2018-05-08 DIAGNOSIS — E88.09 HYPOALBUMINEMIA DUE TO PROTEIN-CALORIE MALNUTRITION: ICD-10-CM

## 2018-05-08 DIAGNOSIS — D69.2 SENILE PURPURA: ICD-10-CM

## 2018-05-08 DIAGNOSIS — E11.65 TYPE 2 DIABETES MELLITUS WITH HYPERGLYCEMIA, WITH LONG-TERM CURRENT USE OF INSULIN: Chronic | ICD-10-CM

## 2018-05-08 DIAGNOSIS — S41.101A OPEN WOUND OF RIGHT UPPER ARM, INITIAL ENCOUNTER: Primary | ICD-10-CM

## 2018-05-08 DIAGNOSIS — E46 HYPOALBUMINEMIA DUE TO PROTEIN-CALORIE MALNUTRITION: ICD-10-CM

## 2018-05-08 DIAGNOSIS — I48.0 PAROXYSMAL ATRIAL FIBRILLATION: ICD-10-CM

## 2018-05-08 DIAGNOSIS — Z79.4 TYPE 2 DIABETES MELLITUS WITH HYPERGLYCEMIA, WITH LONG-TERM CURRENT USE OF INSULIN: Chronic | ICD-10-CM

## 2018-05-08 LAB
ESTIMATED AVG GLUCOSE: 131 MG/DL
HBA1C MFR BLD HPLC: 6.2 %

## 2018-05-08 PROCEDURE — 99213 OFFICE O/P EST LOW 20 MIN: CPT | Mod: PBBFAC,PO | Performed by: INTERNAL MEDICINE

## 2018-05-08 PROCEDURE — 99214 OFFICE O/P EST MOD 30 MIN: CPT | Mod: S$PBB,,, | Performed by: INTERNAL MEDICINE

## 2018-05-08 PROCEDURE — 99999 PR PBB SHADOW E&M-EST. PATIENT-LVL III: CPT | Mod: PBBFAC,,, | Performed by: INTERNAL MEDICINE

## 2018-05-08 RX ORDER — SERTRALINE HYDROCHLORIDE 25 MG/1
TABLET, FILM COATED ORAL
Qty: 30 TABLET | Refills: 1 | Status: SHIPPED | OUTPATIENT
Start: 2018-05-08 | End: 2018-08-06 | Stop reason: SDUPTHER

## 2018-05-08 NOTE — TELEPHONE ENCOUNTER
----- Message from Codi Yarbrough sent at 5/8/2018  2:29 PM CDT -----  Contact: Daughter / Rowena [phone]  PT is currently taking 1000 mg vitamin d a day. Rowena wants to know how much should she increase dosage.

## 2018-05-08 NOTE — PROGRESS NOTES
CC: right arm wound ( dtr +)    87 yo female w/pulmonary fibrosis, on O2, DM, CKD IV, HTN, and afib presented   Hypoalbuminemia; and senile purpura  For Right arm  wounds that occurred when she fell out of her bed and hit a metal table  EMT came after the fall and wrapped her arm  Her other dtr  cleaned and wrapped the arm , yesterday  Itch: n/a  Blisters:n/a  Exudate:serosanguinous  Trauma: +  Tx:clean and protect    DM-w/ circ, renal on insulin  Concern for hypoglycemia when she fell  Lab yesterday w/ Dr. Hernandez in Endo  A1C==> 6.2 ( was 8.0)  However, anemia progressed  From 10 to 8.7 Hgb  Pt taking one Fergon daily  And progression CKD, w/ eGFR from 56.9 to 30.7  Pt reported decreased hydration 2/2 to having to go to bathroom so often  Pt w 24/7 sitters , so always someone present to assist    Depression, w/ anxiety and constant worrying  Dtr requests antidepressant, pt declined initially   However, difficulty dealing w/ declining health and ADL's and independence    MEDCARD: Reviewed  ROS:  No fever, chills, or night sweats  No nausea or emesis  No chest pain or palpitations  No paresthesias or change in temperature  No red streaks or abscess  ROR negative except as previously noted    PMHX:Reviewed  SHX: Reviewed  FHX: Reviewed    PE:  VSS  GEN: Frail, elderly female , A&O, NAD, conversant and co-operative, O2 via NC in place; moon facies  EYES: Conj/lids , unremarkable, sclera anicteric  ENT: O/P mucus membranes dry  NECK: Supple w/o LN  RESP efforts- O2 in place, lungs w/ rales @ bases  CV: afib, no carotid bruits noted  decreased pedal pulses, venous stasis changes  2+ pitting edema  MSK: Gait impaired  NEURO: CHAO, no tremor noted  SKIN: multiple ecchymosis and bruises UE and LE  RUE: multiple open wounds, elbow and forearm and hand w/horizontal tear across the extensor surface , s++ erosanguinous  exudate on bandages  Very tender, no unusual warmth or exudate, decreased ROM hand and arm, 2/2 to  pain  Senile purpura- LLE  Healing wounds LE w/ venous stasis dermatitis      IMPRESSION:  Wounds- RUE  DM w/ CKD 4, neuropathy, circulatory manifestation  Resp failure- on O2  Afib, asx  Hypoalbuminemia,2/2 protein-calorie malntrition     w/ LE edema  Senile purpura  Anemia, progressed  Depression, major        PLAN:  Cleaned and re-dressed  Consult HH- orders placed  Elevate LE  Rx sertraline 25mg, may start w/ 1/2 daily then increase to one whole tablet  ~ 2 weeks  Continue present meds  Add 2nd Fergon daily  Diet increased dark , green leafy Veggies  Call prn

## 2018-05-09 ENCOUNTER — TELEPHONE (OUTPATIENT)
Dept: INTERNAL MEDICINE | Facility: CLINIC | Age: 83
End: 2018-05-09

## 2018-05-09 NOTE — TELEPHONE ENCOUNTER
----- Message from Elsy Egan sent at 5/9/2018  9:52 AM CDT -----  Contact: daughter, Rowena sheikh    [phone]  Rowena want to speak with Melissa about the home health care orders for wound care for patient has not been sent to [facility] as requested, Please call and speak with Laura there. Do not send to [facility] because she did not  Request that facility.

## 2018-05-10 ENCOUNTER — TELEPHONE (OUTPATIENT)
Dept: INTERNAL MEDICINE | Facility: CLINIC | Age: 83
End: 2018-05-10

## 2018-05-10 NOTE — TELEPHONE ENCOUNTER
Spoke to pt's daughter, Rowena. She stated that the pt has already [facility] today. They have discovered a new skin tear to the pt's left lateral leg. Rowena wanted to inform Dr. Estrada.    Rowena stated that the pt is taking iron BID. Rowena wants to know if this is sufficient with the pt's iron deficiency.    Rowena stated that Dr. Agrawal stated that the pt's vitamin D came back low. Rowena stated that she needs to know how much to supplement the pt? She stated that she thought it best to ask Dr. Estrada.    Please advise.

## 2018-05-10 NOTE — TELEPHONE ENCOUNTER
----- Message from Gayatri Frost sent at 5/10/2018  1:09 PM CDT -----  Contact: Rowena/Daughter/ [phone]  Patient is returning a phone call.  Who left a message for the patient: Melissa  Does patient know what this is regarding:  Office visit/or home health?  Comments:

## 2018-05-10 NOTE — TELEPHONE ENCOUNTER
If taking iron BID, would increase to tid x 2 weeks then decrease back to BID  Please remind them to buffer her GI and be on the alert for constipation    Vitamin D appears to have improved, was 20 (9/2016) and now 39 ,normal range is 30-90 but the goal is usually 40, so pt is near     rec Vitamin D-3  2,000 IU daily  If already taking 2,000, would take 4,000 3 days/week  And 2,000 other 4 days  Usually helps balance and gait

## 2018-05-17 NOTE — PROGRESS NOTES
HPI     Diabetic Eye Exam    Additional comments: Photos           Comments   89 y.o. y/o here for screening for Diabetic Renopathy with non-dilated   fundus photos per Gayathri Resendiz MD         Last edited by Yvette Han MA on 5/17/2018  2:26 PM. (History)            Assessment /Plan     For exam results, see Encounter Report.    Type 2 diabetes mellitus with hyperglycemia, with long-term current use of insulin  -     Diabetic Eye Screening Photo    Adverse effect of corticosteroids, subsequent encounter  -     Diabetic Eye Screening Photo    Vitamin D deficiency  -     Diabetic Eye Screening Photo

## 2018-05-18 RX ORDER — MUPIROCIN 20 MG/G
OINTMENT TOPICAL
Qty: 30 G | Refills: 0 | Status: SHIPPED | OUTPATIENT
Start: 2018-05-18 | End: 2018-08-30 | Stop reason: SDUPTHER

## 2018-05-21 ENCOUNTER — TELEPHONE (OUTPATIENT)
Dept: OPTOMETRY | Facility: CLINIC | Age: 83
End: 2018-05-21

## 2018-05-21 NOTE — TELEPHONE ENCOUNTER
----- Message from Lucita Hernandez sent at 5/21/2018 11:53 AM CDT -----  Contact: Jahaira Garcia   Pt daughter Ms.Deborah FRENCH Would like to speak with  nurse to rescheduled the appointment she has some question and concerns please ,she can be reached at [phone] please thank you.

## 2018-05-29 ENCOUNTER — TELEPHONE (OUTPATIENT)
Dept: INTERNAL MEDICINE | Facility: CLINIC | Age: 83
End: 2018-05-29

## 2018-05-29 NOTE — TELEPHONE ENCOUNTER
Spoke to Lorena with [facility]. She stated that she wanted to clarify the pt's wound care orders that the pt did not need a wound vac.  I advised that there is no wound vac needed.

## 2018-05-29 NOTE — TELEPHONE ENCOUNTER
----- Message from Suzy Olivia sent at 5/29/2018 10:56 AM CDT -----  Contact: Lorena/[facility]/[phone]  Lorena would like to verify that the pt do not have a wound vac.

## 2018-05-31 ENCOUNTER — TELEPHONE (OUTPATIENT)
Dept: INTERNAL MEDICINE | Facility: CLINIC | Age: 83
End: 2018-05-31

## 2018-05-31 RX ORDER — DICLOFENAC SODIUM 10 MG/G
2 GEL TOPICAL DAILY
Qty: 100 G | Refills: 0 | Status: SHIPPED | OUTPATIENT
Start: 2018-05-31 | End: 2018-06-22 | Stop reason: SDUPTHER

## 2018-05-31 NOTE — TELEPHONE ENCOUNTER
"----- Message from Radha Allen sent at 5/31/2018 11:14 AM CDT -----  Contact:  Pt daughter [phone]  Patient would like to get medical advice.    Symptoms (please be specific):  Hip pains    How long has patient had these symptoms:  1 week    Pharmacy name and phone # (DON'T enter "on file" or "in chart"):  [facility] - [place] - [address] [phone] (Phone)  [phone] (Fax)        Any drug allergies:      Would you prefer a response via [employer]?:      Comments:  Pt daughter states she would like medication prescribed for pt. Pt daughter also states if the line is busy when someone tries to call her, call back.  "

## 2018-05-31 NOTE — TELEPHONE ENCOUNTER
Spoke to pt's daughter, Rowena. She stated that the pt was evaluated by  PT. She stated that the pt is non-weight bearing on her right hip. The therapist suspects that the pt has bursitis of the hip. Pt's daughter is requesting a rx for this. She requested meloxicam, because she herself has taken this for her own bursitis before.  I advised that the pt has decreased kidney levels. NSAID not advised.   Please advise.  Ready for eRx.

## 2018-06-01 ENCOUNTER — CLINICAL SUPPORT (OUTPATIENT)
Dept: ELECTROPHYSIOLOGY | Facility: CLINIC | Age: 83
End: 2018-06-01
Payer: MEDICARE

## 2018-06-01 DIAGNOSIS — I44.2 CHB (COMPLETE HEART BLOCK): ICD-10-CM

## 2018-06-01 DIAGNOSIS — Z95.0 CARDIAC PACEMAKER IN SITU: ICD-10-CM

## 2018-06-01 PROCEDURE — 93296 REM INTERROG EVL PM/IDS: CPT | Mod: PBBFAC | Performed by: INTERNAL MEDICINE

## 2018-06-01 PROCEDURE — 93294 REM INTERROG EVL PM/LDLS PM: CPT | Mod: ,,, | Performed by: INTERNAL MEDICINE

## 2018-06-05 ENCOUNTER — TELEPHONE (OUTPATIENT)
Dept: ADMINISTRATIVE | Facility: CLINIC | Age: 83
End: 2018-06-05

## 2018-06-18 ENCOUNTER — TELEPHONE (OUTPATIENT)
Dept: INTERNAL MEDICINE | Facility: CLINIC | Age: 83
End: 2018-06-18

## 2018-06-18 NOTE — TELEPHONE ENCOUNTER
----- Message from Morena Cullen sent at 6/18/2018  9:47 AM CDT -----  Contact: Osbaldo with [facility] [phone]  Osbaldo will like an verble order to extend Pt therapy two time a week, for three week.

## 2018-06-22 RX ORDER — DICLOFENAC SODIUM 10 MG/G
GEL TOPICAL
Qty: 100 G | Refills: 0 | Status: SHIPPED | OUTPATIENT
Start: 2018-06-22 | End: 2018-08-17 | Stop reason: SDUPTHER

## 2018-06-26 ENCOUNTER — TELEPHONE (OUTPATIENT)
Dept: ENDOCRINOLOGY | Facility: CLINIC | Age: 83
End: 2018-06-26

## 2018-06-26 ENCOUNTER — PATIENT MESSAGE (OUTPATIENT)
Dept: ENDOCRINOLOGY | Facility: CLINIC | Age: 83
End: 2018-06-26

## 2018-06-26 NOTE — TELEPHONE ENCOUNTER
----- Message from Codi Yarbrough sent at 6/26/2018  2:46 PM CDT -----  Contact: Nasreen / Daughter [phone]  Nasreen is requesting a call to discuss her mother's insulin injection.   If she does not answer, pls try again in 5 mins.

## 2018-06-27 ENCOUNTER — NURSE TRIAGE (OUTPATIENT)
Dept: ADMINISTRATIVE | Facility: CLINIC | Age: 83
End: 2018-06-27

## 2018-06-27 NOTE — TELEPHONE ENCOUNTER
Pt's dtr calling, states mother has been having diarrhea for several days, she has tried pepto bismol and the diarrhea is just getting worse. Wants to know what Dr. Estrada wants her to do, give Immodium?   Her pressure is also elevated 178/64.  Caller was involved in something else and asked for me to call her back in 10 minutes.  Called her back, she was able to answer some triage questions, I advised that she come into the office today, and dtr stated she's too weak to come in for an md appt, so I advised if she was that weak, she should probably be seen in the ED.  Dtr just wants to try immodium, otc, but wants to make sure this will not make her kidneys worse. I discussed high risk for dehydration and electrolyte imbalance, and advised on rehydration techniques, as well as discussed Loperamide, that it's metabolized through the liver, and should not cause any worsening of renal failure.  Dtr asks that I call her mother to discuss further, as she is at an md appt herself.  Attempted to call her mother at both the cell and home #, no answer at either number, left a vm.  Will send message to Dr. Estrada to reach out to the patient with advice, since she refused  Disposition of office visit.    Reason for Disposition   Weak immune system (e.g., HIV positive, cancer chemo, splenectomy, organ transplant, chronic steroids)    Additional Information   Negative: Patient sounds very sick or weak to the triager     dtr states there is some weakness, pt can still ambulate with assistance, but this is not a far departure from her normal baseline   Commented on: SEVERE abdominal pain (e.g., excruciating)     Dtr is Unsure if there is any abdominal pain.   Commented on: Bloody, black, or tarry bowel movements     dtr is unsure   Commented on: Drinking very little and has signs of dehydration (e.g., no urine > 12 hours, very dry mouth, very lightheaded)     dtr states her mother does not drink as much as she should, but  does drink small sips throughout the day.  States she can make her drink more, if she needs to.   Commented on: Constant abdominal pain lasting > 2 hours     dtr is unsure of abdominal pain   Commented on: Age < 60 years and has had > 15 diarrhea stools in past 24 hours     unknown   Commented on: Age > 60 years and has had > 6 diarrhea stools in past 24 hours     unknown   Commented on: Abdominal pain  (Exception: pain clears completely with each passage of diarrhea stool)     unknown   Commented on: Mucus or pus in stool has been present > 2 days and diarrhea is more than mild     unknown    Protocols used: [facility]

## 2018-06-28 ENCOUNTER — TELEPHONE (OUTPATIENT)
Dept: INTERNAL MEDICINE | Facility: CLINIC | Age: 83
End: 2018-06-28

## 2018-06-28 NOTE — TELEPHONE ENCOUNTER
Called pt's daughter. No answer, vm full. Called pt who stated that she hasn't had any episode of diarrhea today. Stated that she is drinking lots of water.

## 2018-06-28 NOTE — TELEPHONE ENCOUNTER
----- Message from Joceline Resendiz MD sent at 6/27/2018  5:42 PM CDT -----  rec APPT if sx persist or ED if needs IVF

## 2018-07-13 RX ORDER — PRAVASTATIN SODIUM 20 MG/1
TABLET ORAL
Qty: 90 TABLET | Refills: 0 | Status: SHIPPED | OUTPATIENT
Start: 2018-07-13 | End: 2018-10-12 | Stop reason: SDUPTHER

## 2018-07-26 ENCOUNTER — TELEPHONE (OUTPATIENT)
Dept: ELECTROPHYSIOLOGY | Facility: CLINIC | Age: 83
End: 2018-07-26

## 2018-07-26 NOTE — TELEPHONE ENCOUNTER
----- Message from Cornelius Ann MD sent at 7/26/2018  1:40 PM CDT -----  Regarding: RE: Turn OFF V% pacing alert?  ok  ----- Message -----  From: Shiloh Armstrong  Sent: 7/26/2018   9:32 AM  To: Cornelius Ann MD, Placido Donahuee  Subject: Turn OFF V% pacing alert?                        Dr. Ann,    Device check 12/11/17 underlying rhythm c/w CHB with junctional escape in the 30's and 65% RV pacing.    V pacing 43% since 12/11/18 and 100% since the end of May.    Okay to turn OFF V percent pacing greater than limit alert?    Thanks,  Shiloh

## 2018-08-03 ENCOUNTER — TELEPHONE (OUTPATIENT)
Dept: ELECTROPHYSIOLOGY | Facility: CLINIC | Age: 83
End: 2018-08-03

## 2018-08-03 NOTE — TELEPHONE ENCOUNTER
----- Message from Brenda Perry MA sent at 8/3/2018  4:13 PM CDT -----  Contact: daughter called  Please call the patient daughter  Nasreen at [phone] she have some question she would like to ask about her device. Thank you.       I walked patients daughter through re-connecting her remote pacemaker home monitor.

## 2018-08-06 DIAGNOSIS — J84.9 ILD (INTERSTITIAL LUNG DISEASE): ICD-10-CM

## 2018-08-06 DIAGNOSIS — R06.09 DOE (DYSPNEA ON EXERTION): ICD-10-CM

## 2018-08-06 RX ORDER — ALBUTEROL SULFATE 0.83 MG/ML
SOLUTION RESPIRATORY (INHALATION)
Qty: 525 ML | Refills: 1 | Status: SHIPPED | OUTPATIENT
Start: 2018-08-06

## 2018-08-06 RX ORDER — SERTRALINE HYDROCHLORIDE 25 MG/1
TABLET, FILM COATED ORAL
Qty: 30 TABLET | Refills: 5 | Status: SHIPPED | OUTPATIENT
Start: 2018-08-06 | End: 2018-08-16

## 2018-08-07 ENCOUNTER — TELEPHONE (OUTPATIENT)
Dept: INTERNAL MEDICINE | Facility: CLINIC | Age: 83
End: 2018-08-07

## 2018-08-07 DIAGNOSIS — S81.809A MULTIPLE OPENS WOUND OF LOWER EXTREMITY, UNSPECIFIED LATERALITY, INITIAL ENCOUNTER: Primary | ICD-10-CM

## 2018-08-07 NOTE — TELEPHONE ENCOUNTER
----- Message from Ana Luisa Monte sent at 8/7/2018 10:28 AM CDT -----  Contact: Nasreen [phone]  Nasreen would like a call back in regards to Jahaira right leg has fluid leaking and also [facility].

## 2018-08-08 NOTE — TELEPHONE ENCOUNTER
Spoke to pt's daughter, Rowena. She stated that the pt's legs have been swelling and started weeping clear liquid on Friday. She stated that the nurse at her assisted living reports some small skin tears to the pt's legs. They believe that this is when the fluid may have broken skin. Rowena is requesting an order for [facility] to come out for the pt to take care. Rowena stated that the pt has an appointment with her cardiologist next week.  I advised that the pt needs to be evaluated for her legs for infection, first. I also advised that the pt will need to be see within 90 day for medicare to cover HH. Today makes exactly 90 days since the last visit.  Rowena offered appointments this week with another physician. She declined. Prefers to wait and see Dr. Estrada.  I encouraged her to have pt seen sooner for possible infection. She continued to decline.    Rowena stated that the pt has moved to assisted living. She stated that it is very difficult to move the pt because she is very weak. She stated that pt has not been out since being admitted there. She stated that the pt drinks plenty of water and is well hydrated.    Rowena stated that she also wanted the pt to get vitamin B 12 injections. She stated that the pt has anemia and is already taking iron.  I advised that there is not documentation that the pt has vitamin b 12 deficiency. I advised that she can start the pt in OTC vitamin B 12 1000 mcg SL tablets daily prn if she would like to supplement her.     She wants HH ordered. If she does not answer, leave message with aide at [phone] to let them know if HH is approved.  Please advise.

## 2018-08-09 ENCOUNTER — TELEPHONE (OUTPATIENT)
Dept: INTERNAL MEDICINE | Facility: CLINIC | Age: 83
End: 2018-08-09

## 2018-08-09 NOTE — TELEPHONE ENCOUNTER
[facility] allowed a subsequent order to External   If dtr would call them as order would not allow name placed , just external

## 2018-08-09 NOTE — TELEPHONE ENCOUNTER
----- Message from Fely Post sent at 8/9/2018  2:14 PM CDT -----  Contact: Maeve from [facility] [phone] opt [phone]  [facility] is requesting that notes from pts last appt (May 8th) and upcoming appt be sent to them. Please advise.    Fax [phone]

## 2018-08-09 NOTE — TELEPHONE ENCOUNTER
Spoke to Laura at [facility] at [phone]. I inquired if the pt was still in their service.   She stated that the pt had been d/c.  Order faxed to Laura for re-admission at [phone].    Returned call to pt's daughter, Rowena. No answer. Left message advising that the HH had been ordered and faxed to [facility].

## 2018-08-09 NOTE — TELEPHONE ENCOUNTER
[facility] noted pt was an active HH pt  Is that incorrect  Okay to call her HH agency and see if she is still active of if they can resume   At 90 day cusp,

## 2018-08-09 NOTE — TELEPHONE ENCOUNTER
----- Message from Fely Post sent at 8/9/2018 11:52 AM CDT -----  Contact: pt daughter Rowena [phone]  Pt daughter calling to follow up on home health being ordered for pt. Please advise.

## 2018-08-09 NOTE — TELEPHONE ENCOUNTER
[facility] called. No answer. Left message to call back to back to discuss admit orders to fax.

## 2018-08-10 ENCOUNTER — TELEPHONE (OUTPATIENT)
Dept: INTERNAL MEDICINE | Facility: CLINIC | Age: 83
End: 2018-08-10

## 2018-08-10 NOTE — TELEPHONE ENCOUNTER
----- Message from Gladys Kirkland sent at 8/10/2018  1:41 PM CDT -----  Contact: [facility] [phone]  She's calling in regards to letting you know that the patient have a wound on her right leg below her knee that is red and warm.

## 2018-08-10 NOTE — TELEPHONE ENCOUNTER
Spoke to Jaleesa with [facility]. She stated that edema in both legs. The right more than the left is swollen. She reports little blisters weeping. There is a wound on the right leg below the knee. Could be cellulitis. Pt is a diabetic. Blood sugars running in the 200s. Jaleesa applied a foam dressing with kerlix. Thinks pt could benefit from Katlyn boots. She stated that the pt is scheduled for a f/u on Monday, 8/13/18. Because of possible cellulitis, would abx be recommended until seen. If so, ready for eRx.    Jaleesa also stated that the pt has not been walking for 3 weeks. Has been in bed. She is also requesting an order for PT.  Please advise.

## 2018-08-11 RX ORDER — AMOXICILLIN 500 MG/1
500 TABLET, FILM COATED ORAL EVERY 12 HOURS
Qty: 20 TABLET | Refills: 0 | Status: SHIPPED | OUTPATIENT
Start: 2018-08-11 | End: 2018-08-21

## 2018-08-13 ENCOUNTER — OFFICE VISIT (OUTPATIENT)
Dept: INTERNAL MEDICINE | Facility: CLINIC | Age: 83
End: 2018-08-13
Payer: MEDICARE

## 2018-08-13 VITALS
WEIGHT: 150.38 LBS | TEMPERATURE: 98 F | SYSTOLIC BLOOD PRESSURE: 150 MMHG | DIASTOLIC BLOOD PRESSURE: 80 MMHG | HEART RATE: 72 BPM | BODY MASS INDEX: 32.44 KG/M2 | HEIGHT: 57 IN

## 2018-08-13 DIAGNOSIS — Z79.4 TYPE 2 DIABETES MELLITUS WITH COMPLICATION, WITH LONG-TERM CURRENT USE OF INSULIN: ICD-10-CM

## 2018-08-13 DIAGNOSIS — E88.09 HYPOALBUMINEMIA DUE TO PROTEIN-CALORIE MALNUTRITION: ICD-10-CM

## 2018-08-13 DIAGNOSIS — J84.10 PULMONARY FIBROSIS: ICD-10-CM

## 2018-08-13 DIAGNOSIS — S81.802A OPEN WOUND, LOWER LEG, LEFT, INITIAL ENCOUNTER: ICD-10-CM

## 2018-08-13 DIAGNOSIS — E46 HYPOALBUMINEMIA DUE TO PROTEIN-CALORIE MALNUTRITION: ICD-10-CM

## 2018-08-13 DIAGNOSIS — E11.8 TYPE 2 DIABETES MELLITUS WITH COMPLICATION, WITH LONG-TERM CURRENT USE OF INSULIN: ICD-10-CM

## 2018-08-13 DIAGNOSIS — L03.115 CELLULITIS OF RIGHT LOWER EXTREMITY: Primary | ICD-10-CM

## 2018-08-13 PROCEDURE — 99999 PR PBB SHADOW E&M-EST. PATIENT-LVL IV: CPT | Mod: PBBFAC,GC,, | Performed by: STUDENT IN AN ORGANIZED HEALTH CARE EDUCATION/TRAINING PROGRAM

## 2018-08-13 PROCEDURE — 99214 OFFICE O/P EST MOD 30 MIN: CPT | Mod: S$PBB,GC,, | Performed by: STUDENT IN AN ORGANIZED HEALTH CARE EDUCATION/TRAINING PROGRAM

## 2018-08-13 PROCEDURE — 99214 OFFICE O/P EST MOD 30 MIN: CPT | Mod: PBBFAC,PO | Performed by: STUDENT IN AN ORGANIZED HEALTH CARE EDUCATION/TRAINING PROGRAM

## 2018-08-13 RX ORDER — PREDNISONE 10 MG/1
TABLET ORAL
COMMUNITY
Start: 2018-07-13 | End: 2018-08-16

## 2018-08-13 NOTE — TELEPHONE ENCOUNTER
Spoke to Amanda with [facility]. I informed her of rx. .  She inquired if the pt will have wound care.  I advised that the pt will need to be assessed first. Pt is scheduled to see Dr. Cardoso and Dr. Estrada today at 3:00 PM.

## 2018-08-13 NOTE — PROGRESS NOTES
I have interviewed and examined the patient w/ the resident, Dr. Cardoso  I agree w/ the impression and plan as outlined above.  Joceline Estrada MD- Staff

## 2018-08-13 NOTE — PROGRESS NOTES
Clinic Note  8/13/2018      Subjective:       Patient ID:  Jahaira is a 89 y.o. female being seen for an established visit.    Chief Complaint: Leg Swelling (leg is also weeping) and Leg Pain    Jahaira Garcia is a 89 year old female with pulm. Fibrosis on home O2, DM, CKD stage IV, HTN, Afib, depression, anixety, and senile purpura who presents with right leg swelling and weeping. This began about a week ago and denies trauma or any inciting event. Patient has a history of lower extremity swelling and cellulitis. Amoxicillin was started on 8/11/2018 and she will finish out the course of antibiotics. Patient denies fevers, chills, nausea, vomiting, and diarrhea.  Her daughter was present during the entire exam and provided most of the history.           Review of Systems   Constitutional: Negative for chills and fever.   HENT: Negative for congestion and sinus pain.    Eyes: Negative for blurred vision and double vision.   Respiratory: Positive for shortness of breath. Negative for cough, hemoptysis, sputum production and wheezing.    Cardiovascular: Positive for leg swelling. Negative for chest pain, palpitations, orthopnea and claudication.   Gastrointestinal: Negative for diarrhea, nausea and vomiting.   Genitourinary: Negative for dysuria, flank pain and hematuria.   Musculoskeletal: Negative.    Skin: Negative for rash.       Past Medical History:   Diagnosis Date    Arthritis     Basal cell carcinoma     Complete heart block 2016    Diabetes mellitus type II     Dysphagia     GERD (gastroesophageal reflux disease)     Hiatal hernia     History of colon polyps     HTN (hypertension)     Hyperlipidemia     Lung disease, interstitial     Neuropathy     OA (osteoarthritis)     Pacemaker     st heather    Pulmonary fibrosis     PVC (premature ventricular contraction)     Steroid long-term use 11/8/2012       Family History   Problem Relation Age of Onset    Tuberculosis Mother     Tuberculosis Father   "       reports that  has never smoked. she has never used smokeless tobacco. She reports that she does not drink alcohol or use drugs.    Medication List with Changes/Refills   Current Medications    ACETAMINOPHEN (TYLENOL) 325 MG TABLET    Take 1-2 tablets (325-650 mg total) by mouth nightly as needed for Pain.    ALBUTEROL (PROVENTIL) 2.5 MG /3 ML (0.083 %) NEBULIZER SOLUTION    INHALE 1 VIAL VIA NEBULIZER EVERY 6 HOURS AS NEEDED FOR WHEEZING OR SHORTNESS OF BREATH. RESCUE    ALBUTEROL-IPRATROPIUM 2.5MG-0.5MG/3ML (DUO-NEB) 0.5 MG-3 MG(2.5 MG BASE)/3 ML NEBULIZER SOLUTION    Take 3 mLs by nebulization every evening. And every 6 hours as needed.    AMOXICILLIN (AMOXIL) 500 MG TAB    Take 1 tablet (500 mg total) by mouth every 12 (twelve) hours. for 10 days    ARTIFICIAL TEARS (ISOPTO TEARS) 0.5 % OPHTHALMIC SOLUTION    Place 1 drop into both eyes as needed.    BD ULTRA-FINE CHARAN PEN NEEDLES 32 GAUGE X 5/32" NDLE    USE ONE 3 TIMES DAILY    CARVEDILOL (COREG) 3.125 MG TABLET    Take 1 tablet (3.125 mg total) by mouth 2 (two) times daily.    DICLOFENAC SODIUM 1 % GEL    APPLY 2 GRAMS TOPICALLY ONCE DAILY    FLUZONE HIGH-DOSE 2017-18, PF, 180 MCG/0.5 ML VACCINE        FOLIC ACID/MULTIVIT-MIN/LUTEIN (CENTRUM SILVER ORAL)    Take 1 tablet by mouth once daily.    INSULIN LISPRO (HUMALOG KWIKPEN) 100 UNIT/ML INPN PEN    Inject 22 Units into the skin 3 (three) times daily.    LOSARTAN (COZAAR) 50 MG TABLET    Take 1 tablet (50 mg total) by mouth 2 (two) times daily.    MELATONIN 1 MG TAB    Take by mouth.    METHYL SALICYLATE-MENTHOL (SALONPAS) 10-3 % SPRA    Apply topically.    MUPIROCIN (BACTROBAN) 2 % OINTMENT    APPLY TO AFFECTED AREA(S) THREE TIMES A DAY    ONETOUCH DELICA LANCETS 33 GAUGE MISC    1 lancet by Misc.(Non-Drug; Combo Route) route 3 (three) times daily.    ONETOUCH VERIO STRP    USE ONE strip 3 TIMES DAILY    PRAVASTATIN (PRAVACHOL) 20 MG TABLET    TAKE ONE TABLET BY MOUTH EVERY DAY    PREDNISONE " (DELTASONE) 10 MG TABLET        PREDNISONE (DELTASONE) 5 MG TABLET    1 or 2 tabs daily    PSYLLIUM (METAMUCIL) PACKET    Take 1 packet by mouth once daily.    RIVAROXABAN (XARELTO) 15 MG TAB    Take 1 tablet (15 mg total) by mouth daily with dinner or evening meal.    SERTRALINE (ZOLOFT) 25 MG TABLET    TAKE 1/2 TABLET ONCE DAILY FOR 2 WEEKS, THEN INCREASE TO 1 TABLET ONCE DAILY    SILVER SULFADIAZINE 1% (SILVADENE) 1 % CREAM    Apply topically once daily.   Discontinued Medications    LEVALBUTEROL (XOPENEX) 0.63 MG/3 ML NEBULIZER SOLUTION    USE ONE VIAL TWICE DAILY    POLYETHYLENE GLYCOL (GLYCOLAX) 17 GRAM PWPK    Take 17 g by mouth 3 (three) times daily as needed.     Review of patient's allergies indicates:   Allergen Reactions    Tramadol      Confusion and sleeping and unsteady.       Patient Active Problem List   Diagnosis    IPF (idiopathic pulmonary fibrosis)    Uncontrolled type 2 diabetes mellitus with stage 4 chronic kidney disease, with long-term current use of insulin    Vitamin B 12 deficiency    Essential hypertension    Adrenal cortical steroids causing adverse effect in therapeutic use    HLD (hyperlipidemia)    Debility    Left bundle branch block    Type 2 diabetes mellitus with hyperglycemia    Bradycardia    Shock liver d/t cardiogenic shock from chb    CHB (complete heart block)    ILD (interstitial lung disease)    Cardiac pacemaker in situ    Pulmonary hypertension    Left ventricular diastolic dysfunction    Chronic respiratory failure with hypoxia    Primary narcolepsy with cataplexy    Senile purpura    Recurrent falls    Hypoalbuminemia due to protein-calorie malnutrition    Vitamin D deficiency    Paroxysmal atrial fibrillation    Nocturia    Oliguria    Vaginal atrophy    Chronic constipation    Female genuine stress incontinence    Internal hemorrhoid, bleeding    Wound of left leg    Influenza A    Acute on chronic respiratory failure with hypoxia  "   Leg wound, left           Objective:      BP (!) 150/80 (BP Location: Left arm, Patient Position: Sitting, BP Method: Medium (Manual))   Pulse 72   Temp 97.5 °F (36.4 °C) (Oral)   Ht 4' 9" (1.448 m)   Wt 68.2 kg (150 lb 5.7 oz)   LMP  (LMP Unknown)   BMI 32.54 kg/m²   Estimated body mass index is 32.54 kg/m² as calculated from the following:    Height as of this encounter: 4' 9" (1.448 m).    Weight as of this encounter: 68.2 kg (150 lb 5.7 oz).      Physical Exam   Constitutional: No distress.   HENT:   Head: Normocephalic.   Eyes: EOM are normal. Pupils are equal, round, and reactive to light.   Neck: Normal range of motion. Neck supple.   Cardiovascular: Normal rate, regular rhythm and normal heart sounds.   Pulmonary/Chest: She has no wheezes. She has rales.   Labored breathing on home O2.    Musculoskeletal:        Left lower leg: She exhibits tenderness, swelling and edema.        Legs:  Skin: She is not diaphoretic.   Nursing note and vitals reviewed.        Assessment and Plan:         Patient was seen today for leg swelling and leg pain.    Diagnoses and all orders for this visit:    Cellulitis of right lower extremity        -     Patient does not have fever or chills.       -     Continue course of Amoxicillin 500 mg BID that began on 8/11/2018.        -     Changed dressing today in clinic and home health will visit patient.     Open wound       -     Dressing was changed today in clinic.     Pulmonary fibrosis      -      Patient has labored breathing but this is her baseline.       -      On home O2 and prednisone.     Diabetes Mellitus type II      -      Last A1c was 6.2%  3 months ago.       -      Humalog 22 units 3 times daily    Hypoalbuminemia due to protein caloria malnutrition       -    Contributing to lower extremity edema             Other Orders Placed This Visit:  No orders of the defined types were placed in this encounter.            Interview, Assessment, Findings, and Plan " discussed with Dr. Estrada.    Follow-up in about 4 weeks (around 9/10/2018), or if symptoms worsen or fail to improve.      Michael Cardoso M.D.  Internal Medicine

## 2018-08-14 ENCOUNTER — TELEPHONE (OUTPATIENT)
Dept: INTERNAL MEDICINE | Facility: CLINIC | Age: 83
End: 2018-08-14

## 2018-08-14 NOTE — TELEPHONE ENCOUNTER
----- Message from Marvin Valencia sent at 8/14/2018 12:56 PM CDT -----  Contact: [facility] [phone]  H/H nurse calling would like to know, can get updated [facility] orders please?    Please call an advise  Thank you

## 2018-08-14 NOTE — TELEPHONE ENCOUNTER
----- Message from Gladys Kirkland sent at 8/13/2018  4:18 PM CDT -----  Contact: Omaira @ [facility] Office [phone] Option 1 Fax# [phone]  She's calling in regards to getting the patient's last visit notes.

## 2018-08-14 NOTE — TELEPHONE ENCOUNTER
Spoke to Lorena and she wanted to know if it was ok to cleanse wound with wound  and redress with non adhesive dressing? Orders approved per Dr. Estrada.

## 2018-08-14 NOTE — TELEPHONE ENCOUNTER
----- Message from Lulu Gaming sent at 8/14/2018 12:38 PM CDT -----  Contact: lauro/[facility]/[phone] ext [phone]  Would like to speak to nursing regarding physical therapy orders. Please advise.

## 2018-08-16 ENCOUNTER — OFFICE VISIT (OUTPATIENT)
Dept: CARDIOLOGY | Facility: CLINIC | Age: 83
End: 2018-08-16
Payer: MEDICARE

## 2018-08-16 VITALS
SYSTOLIC BLOOD PRESSURE: 160 MMHG | HEART RATE: 73 BPM | HEIGHT: 57 IN | BODY MASS INDEX: 33.44 KG/M2 | WEIGHT: 155 LBS | OXYGEN SATURATION: 94 % | DIASTOLIC BLOOD PRESSURE: 77 MMHG

## 2018-08-16 DIAGNOSIS — I48.0 PAROXYSMAL ATRIAL FIBRILLATION: ICD-10-CM

## 2018-08-16 DIAGNOSIS — J84.112 IPF (IDIOPATHIC PULMONARY FIBROSIS): ICD-10-CM

## 2018-08-16 DIAGNOSIS — R60.0 LOCALIZED EDEMA: ICD-10-CM

## 2018-08-16 DIAGNOSIS — I10 ESSENTIAL HYPERTENSION: Chronic | ICD-10-CM

## 2018-08-16 DIAGNOSIS — I27.20 PULMONARY HYPERTENSION: ICD-10-CM

## 2018-08-16 DIAGNOSIS — L03.115 CELLULITIS OF RIGHT LOWER EXTREMITY: ICD-10-CM

## 2018-08-16 DIAGNOSIS — J96.11 CHRONIC RESPIRATORY FAILURE WITH HYPOXIA: Chronic | ICD-10-CM

## 2018-08-16 DIAGNOSIS — N18.30 CKD (CHRONIC KIDNEY DISEASE) STAGE 3, GFR 30-59 ML/MIN: ICD-10-CM

## 2018-08-16 DIAGNOSIS — Z95.0 CARDIAC PACEMAKER IN SITU: Chronic | ICD-10-CM

## 2018-08-16 DIAGNOSIS — J84.9 ILD (INTERSTITIAL LUNG DISEASE): Primary | Chronic | ICD-10-CM

## 2018-08-16 DIAGNOSIS — I44.7 LEFT BUNDLE BRANCH BLOCK: ICD-10-CM

## 2018-08-16 DIAGNOSIS — J96.21 ACUTE ON CHRONIC RESPIRATORY FAILURE WITH HYPOXIA: ICD-10-CM

## 2018-08-16 DIAGNOSIS — I44.2 CHB (COMPLETE HEART BLOCK): ICD-10-CM

## 2018-08-16 DIAGNOSIS — E78.5 HYPERLIPIDEMIA, UNSPECIFIED HYPERLIPIDEMIA TYPE: Chronic | ICD-10-CM

## 2018-08-16 PROCEDURE — 93010 ELECTROCARDIOGRAM REPORT: CPT | Mod: S$PBB,,, | Performed by: INTERNAL MEDICINE

## 2018-08-16 PROCEDURE — 99999 PR PBB SHADOW E&M-EST. PATIENT-LVL III: CPT | Mod: PBBFAC,,, | Performed by: INTERNAL MEDICINE

## 2018-08-16 PROCEDURE — 99214 OFFICE O/P EST MOD 30 MIN: CPT | Mod: S$PBB,,, | Performed by: INTERNAL MEDICINE

## 2018-08-16 PROCEDURE — 93005 ELECTROCARDIOGRAM TRACING: CPT | Mod: PBBFAC,PO | Performed by: INTERNAL MEDICINE

## 2018-08-16 PROCEDURE — 99213 OFFICE O/P EST LOW 20 MIN: CPT | Mod: PBBFAC,PO | Performed by: INTERNAL MEDICINE

## 2018-08-16 RX ORDER — BUMETANIDE 0.5 MG/1
0.5 TABLET ORAL DAILY
Qty: 30 TABLET | Refills: 11 | Status: SHIPPED | OUTPATIENT
Start: 2018-08-16 | End: 2019-08-16

## 2018-08-16 NOTE — PROGRESS NOTES
Subjective:      Patient ID: Jahaira Garcia is a 89 y.o. female.    Chief Complaint: Follow-up (C/O excess fluid, legs weeping, has to sit up at night sometimes.)    HPI:  Pt c/o bilateral leg edema and weeping blisters of lower extremities.  Pt sleeps in a recliner all day.  [facility] is doing compression stockings and wound care.  Dr Ann monitors pacemaker remotely.  No a fib per daughter.   No bleeding on Xarelto  PCP, Dr Estrada, began amoxicillin to prevent cellulitis  Review of Systems   Cardiovascular: Positive for dyspnea on exertion and leg swelling. Negative for chest pain, claudication, irregular heartbeat, near-syncope, orthopnea, palpitations and syncope.      Able to transfer and walk a few feet only in the house to go to the bathroom.    Pt now lives in assisted living center--[facility] residences.  Past Medical History:   Diagnosis Date    Arthritis     Basal cell carcinoma     Complete heart block 2016    Diabetes mellitus type II     Dysphagia     GERD (gastroesophageal reflux disease)     Hiatal hernia     History of colon polyps     HTN (hypertension)     Hyperlipidemia     Lung disease, interstitial     Neuropathy     OA (osteoarthritis)     Pacemaker     [place]    Pulmonary fibrosis     PVC (premature ventricular contraction)     Steroid long-term use 11/8/2012        Past Surgical History:   Procedure Laterality Date    APPENDECTOMY      CATARACT EXTRACTION EXTRACAPSULAR W/ INTRAOCULAR LENS IMPLANTATION      X 2    CHOLECYSTECTOMY      COLONOSCOPY      ESOPHAGOGASTRODUODENOSCOPY      INSERT / REPLACE / REMOVE PACEMAKER  06/2016    [place]    TONSILLECTOMY, ADENOIDECTOMY      TOTAL KNEE ARTHROPLASTY         Family History   Problem Relation Age of Onset    Tuberculosis Mother     Tuberculosis Father        Social History     Socioeconomic History    Marital status:      Spouse name: None    Number of children: None    Years of education: None  "   Highest education level: None   Social Needs    Financial resource strain: None    Food insecurity - worry: None    Food insecurity - inability: None    Transportation needs - medical: None    Transportation needs - non-medical: None   Occupational History    Occupation: retired   Tobacco Use    Smoking status: Never Smoker    Smokeless tobacco: Never Used   Substance and Sexual Activity    Alcohol use: No    Drug use: No    Sexual activity: No   Other Topics Concern    Are you pregnant or think you may be? Not Asked    Breast-feeding Not Asked   Social History Narrative    Lives alone       Current Outpatient Medications on File Prior to Visit   Medication Sig Dispense Refill    acetaminophen (TYLENOL) 325 MG tablet Take 1-2 tablets (325-650 mg total) by mouth nightly as needed for Pain.      albuterol (PROVENTIL) 2.5 mg /3 mL (0.083 %) nebulizer solution INHALE 1 VIAL VIA NEBULIZER EVERY 6 HOURS AS NEEDED FOR WHEEZING OR SHORTNESS OF BREATH. RESCUE 525 mL 1    albuterol-ipratropium 2.5mg-0.5mg/3mL (DUO-NEB) 0.5 mg-3 mg(2.5 mg base)/3 mL nebulizer solution Take 3 mLs by nebulization every evening. And every 6 hours as needed.  0    amoxicillin (AMOXIL) 500 MG Tab Take 1 tablet (500 mg total) by mouth every 12 (twelve) hours. for 10 days 20 tablet 0    artificial tears (ISOPTO TEARS) 0.5 % ophthalmic solution Place 1 drop into both eyes as needed.      BD ULTRA-FINE CHARAN PEN NEEDLES 32 gauge x 5/32" Ndle USE ONE 3 TIMES DAILY 300 each 0    carvedilol (COREG) 3.125 MG tablet Take 1 tablet (3.125 mg total) by mouth 2 (two) times daily. 180 tablet 2    diclofenac sodium 1 % Gel APPLY 2 GRAMS TOPICALLY ONCE DAILY 100 g 0    FLUZONE HIGH-DOSE 2017-18, PF, 180 mcg/0.5 mL vaccine       insulin lispro (HUMALOG KWIKPEN) 100 unit/mL InPn pen Inject 22 Units into the skin 3 (three) times daily. 3 Box 3    losartan (COZAAR) 50 MG tablet Take 1 tablet (50 mg total) by mouth 2 (two) times daily. 180 " "tablet 3    methyl salicylate-menthol (SALONPAS) 10-3 % SprA Apply topically.      mupirocin (BACTROBAN) 2 % ointment APPLY TO AFFECTED AREA(S) THREE TIMES A DAY 30 g 0    ONETOUCH DELICA LANCETS 33 gauge Misc 1 lancet by Misc.(Non-Drug; Combo Route) route 3 (three) times daily. 100 each 4    ONETOUCH VERIO Strp USE ONE strip 3 TIMES DAILY 100 each 4    pravastatin (PRAVACHOL) 20 MG tablet TAKE ONE TABLET BY MOUTH EVERY DAY 90 tablet 0    predniSONE (DELTASONE) 5 MG tablet 1 or 2 tabs daily 60 tablet 9    psyllium (METAMUCIL) packet Take 1 packet by mouth once daily.  12    rivaroxaban (XARELTO) 15 mg Tab Take 1 tablet (15 mg total) by mouth daily with dinner or evening meal. 90 tablet 3    silver sulfADIAZINE 1% (SILVADENE) 1 % cream Apply topically once daily. 50 g 0    [DISCONTINUED] FOLIC ACID/MULTIVIT-MIN/LUTEIN (CENTRUM SILVER ORAL) Take 1 tablet by mouth once daily.      [DISCONTINUED] melatonin 1 mg Tab Take by mouth.      [DISCONTINUED] predniSONE (DELTASONE) 10 MG tablet       [DISCONTINUED] sertraline (ZOLOFT) 25 MG tablet TAKE 1/2 TABLET ONCE DAILY FOR 2 WEEKS, THEN INCREASE TO 1 TABLET ONCE DAILY 30 tablet 5     No current facility-administered medications on file prior to visit.        Review of patient's allergies indicates:   Allergen Reactions    Tramadol      Confusion and sleeping and unsteady.     Objective:     Vitals:    08/16/18 1707   BP: (!) 160/77   BP Location: Left arm   Patient Position: Sitting   Pulse: 73   SpO2: (!) 94%   Weight: 70.3 kg (155 lb)   Height: 4' 9" (1.448 m)        Physical Exam   Constitutional: She is oriented to person, place, and time. She appears well-developed and well-nourished.   Eyes: No scleral icterus.   Neck: No JVD present. Carotid bruit is not present.   Cardiovascular: Normal rate and regular rhythm. Exam reveals no gallop.   No murmur heard.  Pulmonary/Chest: She has wheezes. She has rales.   Musculoskeletal: She exhibits edema (one plus " edema bilaterally).   Neurological: She is alert and oriented to person, place, and time.   Skin: Skin is warm and dry.   Psychiatric: She has a normal mood and affect. Her behavior is normal. Judgment and thought content normal.   Vitals reviewed.   Pt appears weak in wheelchair.  On portable oxygen  Note last LVEF in 2016 echo was normal with PAP in the 60's  Creat 1.5, BUN 36  And Hgb 8.7 in May    Pt reports sugar of 178 today    ECG--AV pacing    Assessment:     1. ILD (interstitial lung disease)    2. Chronic respiratory failure with hypoxia    3. IPF (idiopathic pulmonary fibrosis)    4. Pulmonary hypertension    5. Acute on chronic respiratory failure with hypoxia    6. Essential hypertension    7. Hyperlipidemia, unspecified hyperlipidemia type    8. Cardiac pacemaker in situ    9. Left bundle branch block    10. CHB (complete heart block)    11. Paroxysmal atrial fibrillation    12. Cellulitis of right lower extremity    13. CKD (chronic kidney disease) stage 3, GFR 30-59 ml/min    14. Localized edema      Plan:   Jahaira was seen today for follow-up.    Diagnoses and all orders for this visit:    ILD (interstitial lung disease)    Chronic respiratory failure with hypoxia    IPF (idiopathic pulmonary fibrosis)    Pulmonary hypertension    Acute on chronic respiratory failure with hypoxia    Essential hypertension    Hyperlipidemia, unspecified hyperlipidemia type    Cardiac pacemaker in situ    Left bundle branch block    CHB (complete heart block)    Paroxysmal atrial fibrillation  -     IN OFFICE EKG 12-LEAD (to [facility])    Cellulitis of right lower extremity    CKD (chronic kidney disease) stage 3, GFR 30-59 ml/min  -     CBC auto differential; Future  -     Comprehensive metabolic panel; Future  -     TSH; Future  -     Brain natriuretic peptide; Future  -     Basic metabolic panel; Standing    Localized edema  -     CBC auto differential; Future  -     Comprehensive metabolic panel; Future  -     TSH;  Future  -     Brain natriuretic peptide; Future  -     Basic metabolic panel; Standing    Other orders  -     bumetanide (BUMEX) 0.5 MG Tab; Take 1 tablet (0.5 mg total) by mouth once daily.       Will repeat labs via home health nurse  Add bumetanide 0.25 mg daily  RTC one month  F/u with Dr Gordon next week.    No Follow-up on file.

## 2018-08-17 DIAGNOSIS — J84.9 ILD (INTERSTITIAL LUNG DISEASE): Primary | ICD-10-CM

## 2018-08-18 RX ORDER — DICLOFENAC SODIUM 10 MG/G
GEL TOPICAL
Qty: 100 G | Refills: 0 | Status: SHIPPED | OUTPATIENT
Start: 2018-08-18 | End: 2018-09-24 | Stop reason: SDUPTHER

## 2018-08-22 ENCOUNTER — HOSPITAL ENCOUNTER (OUTPATIENT)
Dept: PULMONOLOGY | Facility: CLINIC | Age: 83
Discharge: HOME | End: 2018-08-22
Payer: MEDICARE

## 2018-08-22 ENCOUNTER — OFFICE VISIT (OUTPATIENT)
Dept: PULMONOLOGY | Facility: CLINIC | Age: 83
End: 2018-08-22
Payer: MEDICARE

## 2018-08-22 VITALS — WEIGHT: 155 LBS | HEIGHT: 60 IN | BODY MASS INDEX: 30.43 KG/M2

## 2018-08-22 VITALS
WEIGHT: 155 LBS | HEIGHT: 60 IN | OXYGEN SATURATION: 98 % | HEART RATE: 68 BPM | BODY MASS INDEX: 30.43 KG/M2 | DIASTOLIC BLOOD PRESSURE: 76 MMHG | SYSTOLIC BLOOD PRESSURE: 112 MMHG

## 2018-08-22 DIAGNOSIS — J96.01 ACUTE RESPIRATORY FAILURE WITH HYPOXIA: Primary | ICD-10-CM

## 2018-08-22 DIAGNOSIS — J84.9 ILD (INTERSTITIAL LUNG DISEASE): ICD-10-CM

## 2018-08-22 PROCEDURE — 99213 OFFICE O/P EST LOW 20 MIN: CPT | Mod: PBBFAC,25 | Performed by: INTERNAL MEDICINE

## 2018-08-22 PROCEDURE — 94618 PULMONARY STRESS TESTING: CPT | Mod: PBBFAC | Performed by: INTERNAL MEDICINE

## 2018-08-22 PROCEDURE — 94618 PULMONARY STRESS TESTING: CPT | Mod: 26,S$PBB,, | Performed by: INTERNAL MEDICINE

## 2018-08-22 PROCEDURE — 99999 PR PBB SHADOW E&M-EST. PATIENT-LVL III: CPT | Mod: PBBFAC,,, | Performed by: INTERNAL MEDICINE

## 2018-08-22 NOTE — PROCEDURES
Jahaira Garcia is a 89 y.o.  female patient, who presents for a 6 minute walk test ordered by Elvis Gordon MD.  The diagnosis is Qualify for Oxygen; Pulmonary Fibrosis.  The patient's BMI is 30.3 kg/m2. Predicted distance (lower limit of normal) is 170.06 meters.    Test Results:    The test was not completed due to unsteady gait.  The patient stopped 1 time for a total of 284 seconds.  The total time walked was 76 seconds.  During walking, the patient reported:  Dyspnea, Lightheadedness. The patient used supplemental oxygen during testing.     08/22/2018---------Distance: 7.62 meters (25 feet)     O2 Sat % Supplemental Oxygen Heart Rate Blood Pressure Kasie Scale   Pre-exercise  (Resting) 86 % Room Air 72 bpm 113/53 mmHg 3   Pre-exercise  (Resting) 98 % 3 L/M 70 bpm 112/56 mmHg 3   During Exercise 90 % 3 L/M 88 bpm 101/49 mmHg 3   Post-exercise   93 % 3 L/M  70 bpm 104/50 mmHg      Recovery Time: 88 seconds    Performing nurse/tech: Brenden LEON      PREVIOUS STUDY:   07/02/2014---------Distance: 201.17 meters (660 feet)       O2 Sat % Supplemental Oxygen Heart Rate Blood Pressure Kasie Scale   Pre-exercise  (Resting) 95 % Room Air 67 bpm 137/50 mmHg 0.5   During Exercise 87 % Room Air 70 bpm 122/57 mmHg 1   Post-exercise  (Recovery) 91 % Room Air  70 bpm         CLINICAL INTERPRETATION:  Six minute walk distance is 7.62 meters (25 feet) with moderate dyspnea.  At rest, there was significant desaturation while breathing room air.  During exercise, there was significant desaturation while breathing supplemental oxygen.  Both blood pressure and heart rate remained stable with walking.  The patient reported non-pulmonary symptoms during exercise.  Severe exercise impairment is likely due to desaturation and subjective symptoms.  The patient did not complete the study, walking 76 seconds of the 360 second test.  The patient may benefit from using supplemental oxygen.  Since the previous study in July 2014,  exercise capacity is significantly worse.  Based upon age and body mass index, exercise capacity is less than predicted.   Oxygen saturation did improve while breathing supplemental oxygen.

## 2018-08-22 NOTE — PROGRESS NOTES
"Subjective:       Patient ID: Jahaira Garcia is a 89 y.o. female.    Chief Complaint: Shortness of Breath and Interstitial Lung Disease    HPI 88 yo female who I have cared for the past several years comes to do a six minute walk to qualify for supplemental oxygen. She has moderately advanced pulmonary fibrosis and has done well and lived quite a long life due to the supplemental oxygen. She has to get a new vendor and re qualify. Her resting Sa02 on room air is 86%. On 2 liters her saturation is 95%.  She meets the criteria      No flowsheet data found.]  Review of Systems   Constitutional: Negative.    HENT: Negative.    Eyes: Negative.    Respiratory: Negative.         ILD on oxygen   Cardiovascular: Negative.    Genitourinary: Negative.    Endocrine: Type II diabetes   Musculoskeletal: Negative.    Skin: Negative.    Gastrointestinal: Negative.    Neurological: Negative.    Psychiatric/Behavioral: Negative.        Objective:      Physical Exam        Assessment:       No diagnosis found.    Outpatient Encounter Medications as of 2018   Medication Sig Dispense Refill    acetaminophen (TYLENOL) 325 MG tablet Take 1-2 tablets (325-650 mg total) by mouth nightly as needed for Pain.      albuterol (PROVENTIL) 2.5 mg /3 mL (0.083 %) nebulizer solution INHALE 1 VIAL VIA NEBULIZER EVERY 6 HOURS AS NEEDED FOR WHEEZING OR SHORTNESS OF BREATH. RESCUE 525 mL 1    albuterol-ipratropium 2.5mg-0.5mg/3mL (DUO-NEB) 0.5 mg-3 mg(2.5 mg base)/3 mL nebulizer solution Take 3 mLs by nebulization every evening. And every 6 hours as needed.  0    [] amoxicillin (AMOXIL) 500 MG Tab Take 1 tablet (500 mg total) by mouth every 12 (twelve) hours. for 10 days 20 tablet 0    artificial tears (ISOPTO TEARS) 0.5 % ophthalmic solution Place 1 drop into both eyes as needed.      BD ULTRA-FINE CHARAN PEN NEEDLES 32 gauge x 5/32" Ndle USE ONE 3 TIMES DAILY 300 each 0    bumetanide (BUMEX) 0.5 MG Tab Take 1 tablet (0.5 mg total) by " mouth once daily. 30 tablet 11    carvedilol (COREG) 3.125 MG tablet Take 1 tablet (3.125 mg total) by mouth 2 (two) times daily. 180 tablet 2    diclofenac sodium 1 % Gel APPLY 2 GRAMS TOPICALLY ONCE DAILY 100 g 0    FLUZONE HIGH-DOSE 2017-18, PF, 180 mcg/0.5 mL vaccine       insulin lispro (HUMALOG KWIKPEN) 100 unit/mL InPn pen Inject 22 Units into the skin 3 (three) times daily. 3 Box 3    losartan (COZAAR) 50 MG tablet Take 1 tablet (50 mg total) by mouth 2 (two) times daily. 180 tablet 3    methyl salicylate-menthol (SALONPAS) 10-3 % SprA Apply topically.      mupirocin (BACTROBAN) 2 % ointment APPLY TO AFFECTED AREA(S) THREE TIMES A DAY 30 g 0    ONETOUCH DELICA LANCETS 33 gauge Misc 1 lancet by Misc.(Non-Drug; Combo Route) route 3 (three) times daily. 100 each 4    ONETOUCH VERIO Strp USE ONE strip 3 TIMES DAILY 100 each 4    pravastatin (PRAVACHOL) 20 MG tablet TAKE ONE TABLET BY MOUTH EVERY DAY 90 tablet 0    predniSONE (DELTASONE) 5 MG tablet 1 or 2 tabs daily 60 tablet 9    psyllium (METAMUCIL) packet Take 1 packet by mouth once daily.  12    rivaroxaban (XARELTO) 15 mg Tab Take 1 tablet (15 mg total) by mouth daily with dinner or evening meal. 90 tablet 3    silver sulfADIAZINE 1% (SILVADENE) 1 % cream Apply topically once daily. 50 g 0     No facility-administered encounter medications on file as of 8/22/2018.      No orders of the defined types were placed in this encounter.    Plan:       Patient meets the criteria for supplemental oxygen based upon her six minute walk study today. She is requesting a lightweight portable oxygen concentrator and the regular concentrator for home use.

## 2018-08-24 ENCOUNTER — TELEPHONE (OUTPATIENT)
Dept: ADMINISTRATIVE | Facility: CLINIC | Age: 83
End: 2018-08-24

## 2018-08-24 NOTE — TELEPHONE ENCOUNTER
Home Health SOC 08/10/2018 - 10/08/2018 with [facility] ([place]) - Dr. Joceline Resendiz. Patient received SN, PT and OT services.

## 2018-08-29 ENCOUNTER — TELEPHONE (OUTPATIENT)
Dept: ENDOCRINOLOGY | Facility: CLINIC | Age: 83
End: 2018-08-29

## 2018-08-29 NOTE — TELEPHONE ENCOUNTER
----- Message from Codi Yarbrough sent at 8/29/2018  2:16 PM CDT -----  Contact: [facility] [phone]  PT is requesting an order for Freestyle Matias      ..  [facility] - [place] - [address]  [address]  [place] [address]  Phone: [phone] Fax: [phone]

## 2018-08-30 ENCOUNTER — PATIENT MESSAGE (OUTPATIENT)
Dept: ENDOCRINOLOGY | Facility: CLINIC | Age: 83
End: 2018-08-30

## 2018-08-30 ENCOUNTER — PATIENT OUTREACH (OUTPATIENT)
Dept: DIABETES | Facility: CLINIC | Age: 83
End: 2018-08-30

## 2018-08-30 RX ORDER — MUPIROCIN 20 MG/G
OINTMENT TOPICAL
Qty: 22 G | Refills: 0 | Status: SHIPPED | OUTPATIENT
Start: 2018-08-30

## 2018-08-30 NOTE — PROGRESS NOTES
"Filled out [facility] cgm order for Freestyle Matias system.  Faxed signed [facility] cgm form to [facility] with chart notes on file and insurance information.        Note:Dr. Agrawal chart note says that the pt is testing bg "regularly"  It does not say 4 times a day.  Per Medicare, the chart notes have to say that the pt is testing 4 times a day.  Dr. Agrawal is not available to addend notes.  "

## 2018-09-04 ENCOUNTER — CLINICAL SUPPORT (OUTPATIENT)
Dept: ELECTROPHYSIOLOGY | Facility: CLINIC | Age: 83
End: 2018-09-04
Payer: MEDICARE

## 2018-09-04 DIAGNOSIS — Z95.0 CARDIAC PACEMAKER IN SITU: ICD-10-CM

## 2018-09-04 DIAGNOSIS — I44.2 CHB (COMPLETE HEART BLOCK): ICD-10-CM

## 2018-09-04 DIAGNOSIS — Z95.0 CARDIAC PACEMAKER IN SITU: Primary | ICD-10-CM

## 2018-09-04 PROCEDURE — 93294 REM INTERROG EVL PM/LDLS PM: CPT | Mod: ,,, | Performed by: INTERNAL MEDICINE

## 2018-09-04 PROCEDURE — 93296 REM INTERROG EVL PM/IDS: CPT | Mod: PBBFAC | Performed by: INTERNAL MEDICINE

## 2018-09-05 ENCOUNTER — TELEPHONE (OUTPATIENT)
Dept: INTERNAL MEDICINE | Facility: CLINIC | Age: 83
End: 2018-09-05

## 2018-09-05 ENCOUNTER — TELEPHONE (OUTPATIENT)
Dept: ENDOCRINOLOGY | Facility: CLINIC | Age: 83
End: 2018-09-05

## 2018-09-05 DIAGNOSIS — Z95.0 CARDIAC PACEMAKER IN SITU: Primary | ICD-10-CM

## 2018-09-05 NOTE — TELEPHONE ENCOUNTER
----- Message from Mali Monroe sent at 9/5/2018  2:05 PM CDT -----  Contact: Chula/Daughter/[phone]  Pt's daughter is calling to speak with someone in the office in regards to the pt's [employer] form that was supposed to be signed for the pt to be picked up. She states that she has been waiting for 3 weeks for these papers to be signed. [facility]'s fax number is [phone] and phone number is [phone] Please advise.      Thanks

## 2018-09-05 NOTE — TELEPHONE ENCOUNTER
----- Message from Jayna Frost sent at 9/5/2018  9:46 AM CDT -----  Contact: Chula Ceron/[phone]  ..Needs Advice    Reason for call:      Communication Preference:Phone   Additional Information:please call regarding blood testing monitor.

## 2018-09-06 ENCOUNTER — TELEPHONE (OUTPATIENT)
Dept: INTERNAL MEDICINE | Facility: CLINIC | Age: 83
End: 2018-09-06

## 2018-09-06 ENCOUNTER — TELEPHONE (OUTPATIENT)
Dept: PULMONOLOGY | Facility: CLINIC | Age: 83
End: 2018-09-06

## 2018-09-06 DIAGNOSIS — J96.11 CHRONIC RESPIRATORY FAILURE WITH HYPOXIA: Chronic | ICD-10-CM

## 2018-09-06 DIAGNOSIS — J84.9 ILD (INTERSTITIAL LUNG DISEASE): Primary | Chronic | ICD-10-CM

## 2018-09-06 NOTE — TELEPHONE ENCOUNTER
----- Message from Morena Cullen sent at 9/6/2018  9:52 AM CDT -----  Contact: Nasreen [phone]  Pt daughter would like an call back from the nurse in regarding transit, Pt daughter states she gave Dr. Estrada the transit form to fill out.

## 2018-09-06 NOTE — TELEPHONE ENCOUNTER
"Because of a Medicare rule that patients have to change oxygen vendors if they are not "picked" by Medicare, Mrs Haskins had to come in for a 6 minute walk last month and be seen by Dr Gordon. This is because Medicare no longer uses [facility] in [place] and now [facility] has the contract, and they needed the walk results and a new RX from Dr Gordon. This was faxed today. Mrs Haskins is 89 and has been on oxygen since 2013! I called her daughter Chula to let her know that [facility] has the oxygen RX, clinic notes, and 6 min walk results. Taylor Platt LPN  "

## 2018-09-06 NOTE — TELEPHONE ENCOUNTER
Called and left message regarding that we dont have a transit form and advised patient to fax the form to us.

## 2018-09-06 NOTE — TELEPHONE ENCOUNTER
----- Message from Morena Cullen sent at 9/6/2018  9:52 AM CDT -----  Contact: Nasreen [phone]  Pt daughter would like an call back from the nurse in regarding transit, Pt daughter states she gave Dr. Estrada the transit form to fill out.

## 2018-09-06 NOTE — TELEPHONE ENCOUNTER
Spoke with Chula, pt daughter stating that we didn't have the  transit form she stated that she will fax the form over to  .

## 2018-09-06 NOTE — TELEPHONE ENCOUNTER
Nydia.  Spoke with pt daughter, Nasreen regarding that we didn't have a transit form she stated that she will fax form to .

## 2018-09-06 NOTE — TELEPHONE ENCOUNTER
----- Message from Morena Cullen sent at 9/6/2018  9:52 AM CDT -----  Contact: Nasreen [phone]  Pt daughter would like an call back from the nurse in regarding transit, Pt daughter states she gave Dr. Estrada the transit form to fill out.

## 2018-09-11 ENCOUNTER — TELEPHONE (OUTPATIENT)
Dept: INTERNAL MEDICINE | Facility: CLINIC | Age: 83
End: 2018-09-11

## 2018-09-11 NOTE — TELEPHONE ENCOUNTER
----- Message from Sugey Jones sent at 9/11/2018  3:11 PM CDT -----  Contact: [facility]/ [place] [phone]  The patient has a few new skin tears on her arms and legs therefore, she wants to know if she can put a Mepilex Border on it.    Thank you

## 2018-09-16 ENCOUNTER — HOSPITAL ENCOUNTER (EMERGENCY)
Facility: HOSPITAL | Age: 83
Discharge: HOME | End: 2018-09-16
Attending: EMERGENCY MEDICINE
Payer: MEDICARE

## 2018-09-16 VITALS
TEMPERATURE: 98 F | HEIGHT: 65 IN | WEIGHT: 150 LBS | HEART RATE: 78 BPM | DIASTOLIC BLOOD PRESSURE: 78 MMHG | SYSTOLIC BLOOD PRESSURE: 130 MMHG | OXYGEN SATURATION: 98 % | BODY MASS INDEX: 24.99 KG/M2 | RESPIRATION RATE: 16 BRPM

## 2018-09-16 DIAGNOSIS — R53.83 FATIGUE, UNSPECIFIED TYPE: Primary | ICD-10-CM

## 2018-09-16 DIAGNOSIS — R07.9 CHEST PAIN: ICD-10-CM

## 2018-09-16 LAB
ALBUMIN SERPL BCP-MCNC: 2.9 G/DL
ALP SERPL-CCNC: 49 U/L
ALT SERPL W/O P-5'-P-CCNC: 18 U/L
ANION GAP SERPL CALC-SCNC: 10 MMOL/L
APTT BLDCRRT: 27.8 SEC
AST SERPL-CCNC: 23 U/L
BASOPHILS # BLD AUTO: 0.05 K/UL
BASOPHILS NFR BLD: 0.5 %
BILIRUB SERPL-MCNC: 0.5 MG/DL
BILIRUB UR QL STRIP: NEGATIVE
BNP SERPL-MCNC: 458 PG/ML
BUN SERPL-MCNC: 33 MG/DL
CALCIUM SERPL-MCNC: 9.4 MG/DL
CHLORIDE SERPL-SCNC: 97 MMOL/L
CLARITY UR REFRACT.AUTO: CLEAR
CO2 SERPL-SCNC: 28 MMOL/L
COLOR UR AUTO: YELLOW
CREAT SERPL-MCNC: 1.6 MG/DL
DIFFERENTIAL METHOD: ABNORMAL
EOSINOPHIL # BLD AUTO: 0.2 K/UL
EOSINOPHIL NFR BLD: 1.4 %
ERYTHROCYTE [DISTWIDTH] IN BLOOD BY AUTOMATED COUNT: 14.7 %
EST. GFR  (AFRICAN AMERICAN): 32.7 ML/MIN/1.73 M^2
EST. GFR  (NON AFRICAN AMERICAN): 28.4 ML/MIN/1.73 M^2
GLUCOSE SERPL-MCNC: 228 MG/DL
GLUCOSE UR QL STRIP: NEGATIVE
HCT VFR BLD AUTO: 33.6 %
HGB BLD-MCNC: 10.3 G/DL
HGB UR QL STRIP: ABNORMAL
IMM GRANULOCYTES # BLD AUTO: 0.24 K/UL
IMM GRANULOCYTES NFR BLD AUTO: 2.3 %
INR PPP: 1.1
KETONES UR QL STRIP: NEGATIVE
LACTATE SERPL-SCNC: 1.2 MMOL/L
LACTATE SERPL-SCNC: 1.6 MMOL/L
LEUKOCYTE ESTERASE UR QL STRIP: NEGATIVE
LYMPHOCYTES # BLD AUTO: 3.4 K/UL
LYMPHOCYTES NFR BLD: 32 %
MAGNESIUM SERPL-MCNC: 1.6 MG/DL
MCH RBC QN AUTO: 30 PG
MCHC RBC AUTO-ENTMCNC: 30.7 G/DL
MCV RBC AUTO: 98 FL
MONOCYTES # BLD AUTO: 1.4 K/UL
MONOCYTES NFR BLD: 12.9 %
NEUTROPHILS # BLD AUTO: 5.4 K/UL
NEUTROPHILS NFR BLD: 50.9 %
NITRITE UR QL STRIP: NEGATIVE
NRBC BLD-RTO: 0 /100 WBC
PH UR STRIP: 7 [PH] (ref 5–8)
PHOSPHATE SERPL-MCNC: 3.4 MG/DL
PLATELET # BLD AUTO: 327 K/UL
PMV BLD AUTO: 9.2 FL
POCT GLUCOSE: 188 MG/DL (ref 70–110)
POTASSIUM SERPL-SCNC: 4.4 MMOL/L
PROT SERPL-MCNC: 6 G/DL
PROT UR QL STRIP: NEGATIVE
PROTHROMBIN TIME: 11.5 SEC
RBC # BLD AUTO: 3.43 M/UL
SODIUM SERPL-SCNC: 135 MMOL/L
SP GR UR STRIP: 1.01 (ref 1–1.03)
TROPONIN I SERPL DL<=0.01 NG/ML-MCNC: 0.03 NG/ML
TROPONIN I SERPL DL<=0.01 NG/ML-MCNC: 0.03 NG/ML
URN SPEC COLLECT METH UR: ABNORMAL
UROBILINOGEN UR STRIP-ACNC: NEGATIVE EU/DL
WBC # BLD AUTO: 10.61 K/UL

## 2018-09-16 PROCEDURE — 83605 ASSAY OF LACTIC ACID: CPT | Mod: 91

## 2018-09-16 PROCEDURE — 93010 ELECTROCARDIOGRAM REPORT: CPT | Mod: ,,, | Performed by: INTERNAL MEDICINE

## 2018-09-16 PROCEDURE — 83880 ASSAY OF NATRIURETIC PEPTIDE: CPT

## 2018-09-16 PROCEDURE — 96374 THER/PROPH/DIAG INJ IV PUSH: CPT

## 2018-09-16 PROCEDURE — 99285 EMERGENCY DEPT VISIT HI MDM: CPT | Mod: 25

## 2018-09-16 PROCEDURE — 84484 ASSAY OF TROPONIN QUANT: CPT

## 2018-09-16 PROCEDURE — 99285 EMERGENCY DEPT VISIT HI MDM: CPT | Mod: ,,, | Performed by: EMERGENCY MEDICINE

## 2018-09-16 PROCEDURE — 87040 BLOOD CULTURE FOR BACTERIA: CPT

## 2018-09-16 PROCEDURE — 63600175 PHARM REV CODE 636 W HCPCS: Performed by: EMERGENCY MEDICINE

## 2018-09-16 PROCEDURE — 85610 PROTHROMBIN TIME: CPT

## 2018-09-16 PROCEDURE — 80053 COMPREHEN METABOLIC PANEL: CPT

## 2018-09-16 PROCEDURE — 81003 URINALYSIS AUTO W/O SCOPE: CPT

## 2018-09-16 PROCEDURE — 82962 GLUCOSE BLOOD TEST: CPT | Mod: 91

## 2018-09-16 PROCEDURE — 63600175 PHARM REV CODE 636 W HCPCS: Performed by: STUDENT IN AN ORGANIZED HEALTH CARE EDUCATION/TRAINING PROGRAM

## 2018-09-16 PROCEDURE — 85730 THROMBOPLASTIN TIME PARTIAL: CPT

## 2018-09-16 PROCEDURE — 85025 COMPLETE CBC W/AUTO DIFF WBC: CPT

## 2018-09-16 PROCEDURE — 84100 ASSAY OF PHOSPHORUS: CPT

## 2018-09-16 PROCEDURE — 96372 THER/PROPH/DIAG INJ SC/IM: CPT

## 2018-09-16 PROCEDURE — 83735 ASSAY OF MAGNESIUM: CPT

## 2018-09-16 RX ORDER — FUROSEMIDE 10 MG/ML
40 INJECTION INTRAMUSCULAR; INTRAVENOUS
Status: DISCONTINUED | OUTPATIENT
Start: 2018-09-16 | End: 2018-09-16

## 2018-09-16 RX ORDER — FUROSEMIDE 10 MG/ML
40 INJECTION INTRAMUSCULAR; INTRAVENOUS
Status: COMPLETED | OUTPATIENT
Start: 2018-09-16 | End: 2018-09-16

## 2018-09-16 RX ORDER — INSULIN ASPART 100 [IU]/ML
18 INJECTION, SOLUTION INTRAVENOUS; SUBCUTANEOUS
Status: COMPLETED | OUTPATIENT
Start: 2018-09-16 | End: 2018-09-16

## 2018-09-16 RX ORDER — SERTRALINE HYDROCHLORIDE 25 MG/1
25 TABLET, FILM COATED ORAL DAILY
COMMUNITY

## 2018-09-16 RX ADMIN — FUROSEMIDE 40 MG: 10 INJECTION, SOLUTION INTRAMUSCULAR; INTRAVENOUS at 01:09

## 2018-09-16 RX ADMIN — INSULIN ASPART 18 UNITS: 100 INJECTION, SOLUTION INTRAVENOUS; SUBCUTANEOUS at 02:09

## 2018-09-16 NOTE — ED TRIAGE NOTES
Comes to the ED with c/o weakness, states she was unable to get up.  Normally ambulates with walker assistance, but unable to get up today.

## 2018-09-16 NOTE — ED NOTES
Meal tray provided; pt's family requesting home med prior to meal; awaiting further orders; will continue to monitor and provide care

## 2018-09-16 NOTE — ED NOTES
Repeat labs drawn    Pt. Resting in bed in NAD. RR e/u. Continuous cardiac, BP, and O2 monitoring in progress. VS being monitored continuously per MD orders. Pt. Offered bathroom assistance and denies need at this time. Explanation of care/wait provided. Bed in low, locked position with rails up and call bell in reach. Will continue to monitor.

## 2018-09-16 NOTE — ED NOTES
LOC: The patient is awake, alert, and oriented to place, time, situation. Affect is appropriate.  Speech is appropriate and clear.     APPEARANCE: Patient resting comfortably in no acute distress.  Patient is clean and well groomed.    SKIN: The skin is warm and dry; color consistent with ethnicity.  Patient has normal skin turgor and moist mucus membranes. Right elbow abrasion, dressing changed. Ulcers lower extremities. Dressing removed to assess. Pitting edema to both feet. Bruising noted to arms.    MUSCULOSKELETAL: Patient moving upper and lower extremities without difficulty.  Reporting increased  weakness. Ambulates with walker assistance.  Today to weak to utilize.     RESPIRATORY: Airway is open and patent. Patient has an increased  effort and rate.  No accessory muscle use noted. Denies cough.  Crackles to lower lobes. O2 2l applied, O2 sats 100%.    CARDIAC:    Peripheral edema noted. No complaints of chest pain.      ABDOMEN: Soft and non tender to palpation.  No distention noted.    NEUROLOGIC: Eyes open spontaneously.  Behavior appropriate to situation.  Follows commands; facial expression symmetrical.  Purposeful motor response noted; normal sensation in all extremities.

## 2018-09-16 NOTE — ED PROVIDER NOTES
"Encounter Date: 9/16/2018       History     Chief Complaint   Patient presents with    Fatigue     Pt was on the toilet and had a near syncope episode witnessed by home health.      Ms. Garcia is a 88yo woman with PMH of Idiopathic pulmonary fibrosis on 4L O2 NC at home, HTN, HL, pAF w/ CHB s/p pacemaker on Xarelto, CKD III, diastolic dysfunction with normal EF who presents with presyncopal episode. Daughter is at bedside and endorses home health aide witnessed Ms. Garcia "sitting down and seeming weak" today. Unsure if this was on the toilet or sitting in a chair.  Patient endorses light headedness & fatigue but denies dizziness, LOC, fainting, or fall. She endorses SOB and generalized weakness for the past few days but denies CP. She endorses orthopnea but sleeps in a recliner.           Review of patient's allergies indicates:   Allergen Reactions    Tramadol      Confusion and sleeping and unsteady.     Past Medical History:   Diagnosis Date    Arthritis     Basal cell carcinoma     Complete heart block 2016    Diabetes mellitus type II     Dysphagia     GERD (gastroesophageal reflux disease)     Hiatal hernia     History of colon polyps     HTN (hypertension)     Hyperlipidemia     Lung disease, interstitial     Neuropathy     OA (osteoarthritis)     Pacemaker     [facility]    Pulmonary fibrosis     PVC (premature ventricular contraction)     Steroid long-term use 11/8/2012     Past Surgical History:   Procedure Laterality Date    APPENDECTOMY      CATARACT EXTRACTION EXTRACAPSULAR W/ INTRAOCULAR LENS IMPLANTATION      X 2    CHOLECYSTECTOMY      COLONOSCOPY      COLONOSCOPY N/A 8/28/2017    Procedure: COLONOSCOPY;  Surgeon: GAGE Bear MD;  Location: [address]);  Service: Endoscopy;  Laterality: N/A;  multiple medical issues/pul htn/systolic pressure 66 June 23 2016/on continuous O2/Xarelto/OK to hold 2 days per Dr Cornelius Ann/see telephone encounter dated 8/24/17/svn    " COLONOSCOPY N/A 8/28/2017    Performed by GAGE Bear MD at [facility] ENDO (2ND FLR)    ESOPHAGOGASTRODUODENOSCOPY      INSERT / REPLACE / REMOVE PACEMAKER  06/2016    [facility]    INSERTION-PACEMAKER-DUAL Left 6/24/2016    Performed by Cornelius Ann MD at [facility] CATH LAB    INSERTION-PACEMAKER-DUAL Left 6/23/2016    Performed by Tony Hernandez MD at [facility] CATH LAB    TONSILLECTOMY, ADENOIDECTOMY      TOTAL KNEE ARTHROPLASTY       Family History   Problem Relation Age of Onset    Tuberculosis Mother     Tuberculosis Father      Social History     Tobacco Use    Smoking status: Never Smoker    Smokeless tobacco: Never Used   Substance Use Topics    Alcohol use: No    Drug use: No     Review of Systems   Constitutional: Positive for fatigue. Negative for activity change, appetite change, chills, diaphoresis, fever and unexpected weight change.   HENT: Negative for congestion and sore throat.    Eyes: Negative for photophobia and visual disturbance.   Respiratory: Positive for shortness of breath. Negative for apnea, cough, choking, chest tightness and wheezing.    Cardiovascular: Positive for leg swelling. Negative for chest pain and palpitations.   Gastrointestinal: Negative for abdominal distention, abdominal pain, blood in stool, constipation, diarrhea, nausea and vomiting.   Endocrine: Negative for cold intolerance, heat intolerance, polyphagia and polyuria.   Genitourinary: Positive for decreased urine volume. Negative for difficulty urinating, flank pain, frequency, hematuria and urgency.   Musculoskeletal: Negative for arthralgias, back pain and gait problem.       Physical Exam     Initial Vitals [09/16/18 0752]   BP Pulse Resp Temp SpO2   (!) 118/50 80 18 98.4 °F (36.9 °C) 100 %      MAP       --         Physical Exam    Constitutional: She appears well-developed and well-nourished.   HENT:   Head: Normocephalic and atraumatic.   Eyes: EOM are normal.   Cardiovascular: Normal rate, regular  rhythm, normal heart sounds and intact distal pulses. Exam reveals no gallop and no friction rub.    No murmur heard.  2+ PE on BLE   Pulmonary/Chest: No respiratory distress. She has no wheezes. She has no rhonchi. She has no rales. She exhibits no tenderness.   Bilateral course bibasalar crackles are present upon auscultation of the lung fields   Abdominal: Soft. Bowel sounds are normal. She exhibits no distension. There is no tenderness.   Musculoskeletal: Normal range of motion.   Neurological: She is alert and oriented to person, place, and time. No cranial nerve deficit.   Skin: Skin is warm and dry.   Pressure ulcers present on lower right extremity    Psychiatric: She has a normal mood and affect. Her behavior is normal. Judgment and thought content normal.         ED Course   Procedures  Labs Reviewed   CBC W/ AUTO DIFFERENTIAL - Abnormal; Notable for the following components:       Result Value    RBC 3.43 (*)     Hemoglobin 10.3 (*)     Hematocrit 33.6 (*)     MCHC 30.7 (*)     RDW 14.7 (*)     Immature Granulocytes 2.3 (*)     Immature Grans (Abs) 0.24 (*)     Mono # 1.4 (*)     All other components within normal limits   COMPREHENSIVE METABOLIC PANEL - Abnormal; Notable for the following components:    Sodium 135 (*)     Glucose 228 (*)     BUN, Bld 33 (*)     Creatinine 1.6 (*)     Albumin 2.9 (*)     Alkaline Phosphatase 49 (*)     eGFR if  32.7 (*)     eGFR if non  28.4 (*)     All other components within normal limits   TROPONIN I - Abnormal; Notable for the following components:    Troponin I 0.031 (*)     All other components within normal limits   B-TYPE NATRIURETIC PEPTIDE - Abnormal; Notable for the following components:     (*)     All other components within normal limits   URINALYSIS, REFLEX TO URINE CULTURE - Abnormal; Notable for the following components:    Occult Blood UA Trace (*)     All other components within normal limits    Narrative:      Preferred Collection Type->Urine, Clean Catch  One Cup only   POCT GLUCOSE - Abnormal; Notable for the following components:    POCT Glucose 188 (*)     All other components within normal limits   CULTURE, BLOOD    Narrative:     Aerobic and anaerobic   CULTURE, BLOOD    Narrative:     Aerobic and anaerobic   TROPONIN I   LACTIC ACID, PLASMA   LACTIC ACID, PLASMA   PROTIME-INR   APTT   MAGNESIUM   PHOSPHORUS     EKG Readings: (Independently Interpreted)   Electronic ventricular pacemaker, When compared with ECG of 16-AUG-2018 17:05, No significant change was found. Confirmed by ROBERT JARA MD       Imaging Results          X-Ray Chest PA And Lateral (Final result)  Result time 09/16/18 09:08:36    Final result by Fortunato Garcia MD (09/16/18 09:08:36)                 Impression:      Unchanged appearance of the chest as above      Electronically signed by: Fortunato Garcia MD  Date:    09/16/2018  Time:    09:08             Narrative:    EXAMINATION:  XR CHEST PA AND LATERAL    CLINICAL HISTORY:  SOB;    TECHNIQUE:  PA and lateral views of the chest were performed.    COMPARISON:  01/04/2018    FINDINGS:  Cardiac silhouette remains enlarged.  Cardiac pacing device again noted.    Coarsened interstitial opacities again noted throughout the periphery of the bilateral lungs suggesting interstitial lung disease.  Appearance is not significantly changed from prior.  No definite focal parenchymal consolidation, pleural effusion, or pneumothorax visualized.  No acute osseous findings demonstrated.                                 Medical Decision Making:   Initial Assessment:   88yo woman presents with presyncopal episodes and SOB. Hemodynamically stable.   Differential Diagnosis:   Acute chf exacerbation vs. acs vs. Anemia vs generalized fatigue   Clinical Tests:   Lab Tests: Ordered and Reviewed  ED Management:  8yo F presents with generalized weakness. Hb improved compared to previous labs. CBC & CMP stable.  Troponin #1 was elevated at 0.031 with unremarkable EKG, additionally, Troponin #2 was decreased and normal. Urinalysis was insignificant for any infection at this time. She was given 40mg Lasix IV which is equivalent to 2x her home dose of Bumex with adequate increased urinary output afterwards. Patient has been requiring 2.5L of nc during the course of the ED when her baseline is at 4L.  She did not have any focal neurological deficits during her course in the ED.  Would attribute to fatigue 2/2 chronic deconditioning as patient spends most of her time during the day in a recliner sleeping.                Attending Attestation:   Physician Attestation Statement for Resident:  As the supervising MD   Physician Attestation Statement: I have personally seen and examined this patient.   I agree with the above history. -: 89-year-old woman with comorbidity of pulmonary fibrosis on home oxygen presents to the emerged department for evaluation of generalized fatigue   As the supervising MD I agree with the above PE.    As the supervising MD I agree with the above treatment, course, plan, and disposition.  I have reviewed and agree with the residents interpretation of the following: lab data, x-rays and EKG.  I have reviewed the following: old records at this facility.                       Clinical Impression:   The primary encounter diagnosis was Fatigue, unspecified type. A diagnosis of Chest pain was also pertinent to this visit.    Discharged  Stable                 Mike Galvin MD  09/18/18 8023

## 2018-09-17 LAB — POCT GLUCOSE: 237 MG/DL (ref 70–110)

## 2018-09-18 ENCOUNTER — TELEPHONE (OUTPATIENT)
Dept: INTERNAL MEDICINE | Facility: CLINIC | Age: 83
End: 2018-09-18

## 2018-09-18 NOTE — TELEPHONE ENCOUNTER
Melissa, pt's daughter , Chula would like a call back.  Chula cancelled the appt for tomorrow bc her mother is too weak.  The pt went tot th ER on Sunday bc her sugar dropped.  She wants Dr. Estrada to review everything from the ER stay & see if medications need to be changed.   She would also like to receive a call from Dr. Estrada.    2nd, she wants to know if you ever received the paperwork from the [employer]. She is waiting on that, so she can make the appointments.

## 2018-09-19 ENCOUNTER — TELEPHONE (OUTPATIENT)
Dept: INTERNAL MEDICINE | Facility: CLINIC | Age: 83
End: 2018-09-19

## 2018-09-19 NOTE — TELEPHONE ENCOUNTER
JORDYN paperwork completed last week  Reviewed ED MR, chest xray and lab stable, except for decreased kidney fx,  What dose of Bumex is she taking daily??may need to weigh 1st am to see if getting too dry

## 2018-09-19 NOTE — TELEPHONE ENCOUNTER
Spoke to pt's daughter, Nasreen. I advised of recommendations.  She stated that the pt is taking her bumex daily. She stated that she has no swelling at this time.  Nasreen is inquiring if the pt should stop the bumex?    She stated that the pt eats very little, and drinks about two 8-12 oz bottles a day.    I advised to increase water intake.     Please advise.

## 2018-09-19 NOTE — TELEPHONE ENCOUNTER
----- Message from Radha Allen sent at 9/19/2018 11:43 AM CDT -----  Contact: Pt daughter Chula [phone]  Patient is returning a phone call.  Who left a message for the patient: Melissa  Does patient know what this is regarding:    Comments: If no answer please call back a minute later.

## 2018-09-20 NOTE — TELEPHONE ENCOUNTER
Returned call to pt's daughter, Nasreen. No answer. Voicemail box full. Unable to leave a message.

## 2018-09-20 NOTE — TELEPHONE ENCOUNTER
----- Message from Marvin Valencia sent at 9/20/2018 10:28 AM CDT -----  Contact: Daughter Chula  [phone]  Patient is returning a phone call.  Who left a message for the patient: Dr. Sibley  Does patient know what this is regarding:    Comments:     Please call an advise  Thank you

## 2018-09-20 NOTE — TELEPHONE ENCOUNTER
Would hold Bumex and weigh every AM, if weight increases by 2 # would resume for 3 days, then try and hold again, continue to weigh

## 2018-09-21 LAB
BACTERIA BLD CULT: NORMAL
BACTERIA BLD CULT: NORMAL

## 2018-09-24 RX ORDER — DICLOFENAC SODIUM 10 MG/G
GEL TOPICAL
Qty: 100 G | Refills: 0 | Status: SHIPPED | OUTPATIENT
Start: 2018-09-24 | End: 2018-10-11 | Stop reason: SDUPTHER

## 2018-10-12 RX ORDER — DICLOFENAC SODIUM 10 MG/G
GEL TOPICAL
Qty: 100 G | Refills: 0 | Status: SHIPPED | OUTPATIENT
Start: 2018-10-12 | End: 2018-11-07 | Stop reason: SDUPTHER

## 2018-10-12 RX ORDER — PRAVASTATIN SODIUM 20 MG/1
TABLET ORAL
Qty: 90 TABLET | Refills: 0 | Status: SHIPPED | OUTPATIENT
Start: 2018-10-12

## 2018-10-24 ENCOUNTER — TELEPHONE (OUTPATIENT)
Dept: ADMINISTRATIVE | Facility: CLINIC | Age: 83
End: 2018-10-24

## 2018-10-24 NOTE — TELEPHONE ENCOUNTER
Home Health Recert 10/09/2018 to 12/07/2018 with [facility]. Dr Isaiah Resendiz. SN,PT services.

## 2018-11-03 DIAGNOSIS — I10 ESSENTIAL HYPERTENSION: ICD-10-CM

## 2018-11-05 RX ORDER — LOSARTAN POTASSIUM 50 MG/1
TABLET ORAL
Qty: 180 TABLET | Refills: 2 | Status: SHIPPED | OUTPATIENT
Start: 2018-11-05

## 2018-11-06 ENCOUNTER — TELEPHONE (OUTPATIENT)
Dept: INTERNAL MEDICINE | Facility: CLINIC | Age: 83
End: 2018-11-06

## 2018-11-06 DIAGNOSIS — I44.2 CHB (COMPLETE HEART BLOCK): ICD-10-CM

## 2018-11-06 DIAGNOSIS — Z95.0 CARDIAC PACEMAKER IN SITU: Primary | ICD-10-CM

## 2018-11-06 NOTE — TELEPHONE ENCOUNTER
----- Message from Ana Luisa Monte sent at 11/6/2018  9:52 AM CST -----  Contact: Nasreen Ceron (Daughter) [phone]  Nasreen would like a call back in regards to the diabetic nerve cream for Jahaira's foot. Nasreen doesn't not know the name of the medication. Nasreen states the pharmacy has faxed multiple time and there has been no response and she would like to know why.

## 2018-11-07 ENCOUNTER — TELEPHONE (OUTPATIENT)
Dept: ENDOCRINOLOGY | Facility: CLINIC | Age: 83
End: 2018-11-07

## 2018-11-07 NOTE — TELEPHONE ENCOUNTER
----- Message from Elba España sent at 11/6/2018 10:02 AM CST -----  Contact:  [phone], if no one answer call back in 2 min  Rx Refill/Request     Is this a Refill or New Rx:  Refill  Rx Name and Strength:  Status of Pt Freestyle Matias, if approved where to pick-up.  Preferred Pharmacy with phone number:    Communication Preference: [phone], Daughter  Additional Information:  Also asking if can get refill for: diclofenac sodium (VOLTAREN) 1 % Gel

## 2018-11-08 RX ORDER — DICLOFENAC SODIUM 10 MG/G
GEL TOPICAL
Qty: 100 G | Refills: 1 | Status: SHIPPED | OUTPATIENT
Start: 2018-11-08 | End: 2019-04-23 | Stop reason: SDUPTHER

## 2018-11-09 NOTE — TELEPHONE ENCOUNTER
Spoke to pt's daughter, Nasreen. She requested that Dr. Estrada refill the pt's diclofenac gel.   I advised that this was just refilled on 10/11/18.   She stated that the pt is using a lot more for her diabetic neuropathy on her feet.   I advised that this is not a medication usually prescribes. Pt using too much.    Daughter stated that she will ask Dr. Agrawal for this rx.

## 2018-11-26 ENCOUNTER — NURSE TRIAGE (OUTPATIENT)
Dept: ADMINISTRATIVE | Facility: CLINIC | Age: 83
End: 2018-11-26

## 2018-11-26 NOTE — TELEPHONE ENCOUNTER
"    Reason for Disposition   [1] Caller requesting NON-URGENT health information AND [2] PCP's office is the best resource    Answer Assessment - Initial Assessment Questions  1. REASON FOR CALL or QUESTION: "What is your reason for calling today?" or "How can I best help you?" or "What question do you have that I can help answer?"      Pt's daughter calling. Reported she slipped off toilet- has some skin tears- 3" on left forearm, 2" between thumb/pointer finger on right hand and a small laceration on left pinky. No other trauma reported. Daughter was calling to request Dr Estrada put in an order for [facility] who have come out in the past and addressed pt's skin tears with good results.   [facility] [phone].    Protocols used: ST INFORMATION ONLY CALL-A-AH      "

## 2018-11-27 ENCOUNTER — TELEPHONE (OUTPATIENT)
Dept: INTERNAL MEDICINE | Facility: CLINIC | Age: 83
End: 2018-11-27

## 2018-11-27 DIAGNOSIS — S51.809A: ICD-10-CM
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
## 2018-11-27 DIAGNOSIS — S81.809A WOUND OF LOWER EXTREMITY, UNSPECIFIED LATERALITY, INITIAL ENCOUNTER: ICD-10-CM

## 2018-11-27 NOTE — TELEPHONE ENCOUNTER
Spoke to Tract with [facility]. She stated that they received a referral in Epic, but they need an order for HH faxed to them at [phone], Attn: Maeve.

## 2018-11-27 NOTE — TELEPHONE ENCOUNTER
----- Message from Morena Cullen sent at 11/27/2018  2:44 PM CST -----  Contact: [facility] [phone]  Maeve will like to speak to the nurse in regards to an referral for services.

## 2018-12-11 ENCOUNTER — TELEPHONE (OUTPATIENT)
Dept: INTERNAL MEDICINE | Facility: CLINIC | Age: 83
End: 2018-12-11

## 2018-12-11 NOTE — TELEPHONE ENCOUNTER
----- Message from Gayatri Frost sent at 12/11/2018  9:19 AM CST -----  Contact: Omaira/ [facility]/ [phone] opt 1  Omaira would like a call back about patient need to be seen by Dr. Estrada to continue home health. Patient last visit was 5/8/18.

## 2018-12-11 NOTE — TELEPHONE ENCOUNTER
Spoke to Omaira with [facility]. She stated that the pt needs an office visit   Spoke to pt's daughter, Nasreen. I advised that the pt needs a f/u for medicare to continue paying for HH.  Pt scheduled to see Dr. Chu/Dr. Estrada on 12/18/18 at 2:00 PM.

## 2018-12-13 ENCOUNTER — TELEPHONE (OUTPATIENT)
Dept: ADMINISTRATIVE | Facility: CLINIC | Age: 83
End: 2018-12-13

## 2018-12-13 NOTE — TELEPHONE ENCOUNTER
Home Health SOC 11/28/2018 - 01/26/2019 with [facility] ([place]) - Dr. Joceline Resendiz. Patient received  services.

## 2018-12-17 ENCOUNTER — TELEPHONE (OUTPATIENT)
Dept: ELECTROPHYSIOLOGY | Facility: CLINIC | Age: 83
End: 2018-12-17

## 2018-12-17 ENCOUNTER — TELEPHONE (OUTPATIENT)
Dept: ENDOCRINOLOGY | Facility: CLINIC | Age: 83
End: 2018-12-17

## 2018-12-17 NOTE — TELEPHONE ENCOUNTER
----- Message from Elba España sent at 12/17/2018 12:30 PM CST -----  Contact: Daughter  .Needs Advice    Reason for call: Pt is inquiring to get a FreeStyle but can't get an appt, please call        Communication Preference: [phone]    Additional Information:

## 2018-12-17 NOTE — TELEPHONE ENCOUNTER
Called spoke with daughter regarding an appointment daughter stated she will give me a call right back

## 2018-12-17 NOTE — TELEPHONE ENCOUNTER
----- Message from Davina Singh sent at 12/17/2018  2:07 PM CST -----  Contact: Rowena   tel:  [phone]   Returning a call to Lynette.   Received the appt. And calling to confirm it.

## 2018-12-17 NOTE — TELEPHONE ENCOUNTER
Called patient daughter to change appointment for another day but the daughter was not home so I lvm

## 2018-12-18 ENCOUNTER — OFFICE VISIT (OUTPATIENT)
Dept: INTERNAL MEDICINE | Facility: CLINIC | Age: 83
End: 2018-12-18
Payer: MEDICARE

## 2018-12-18 VITALS
HEIGHT: 65 IN | OXYGEN SATURATION: 98 % | DIASTOLIC BLOOD PRESSURE: 80 MMHG | SYSTOLIC BLOOD PRESSURE: 138 MMHG | BODY MASS INDEX: 24.96 KG/M2 | RESPIRATION RATE: 18 BRPM | HEART RATE: 70 BPM | TEMPERATURE: 99 F

## 2018-12-18 DIAGNOSIS — R53.81 DEBILITY: ICD-10-CM

## 2018-12-18 DIAGNOSIS — D49.0 COLON TUMOR: ICD-10-CM

## 2018-12-18 DIAGNOSIS — T38.0X5A STEROID-INDUCED DIABETES: ICD-10-CM

## 2018-12-18 DIAGNOSIS — T14.8XXA MULTIPLE SKIN TEARS: Primary | ICD-10-CM

## 2018-12-18 DIAGNOSIS — R13.10 DYSPHAGIA, UNSPECIFIED TYPE: ICD-10-CM

## 2018-12-18 DIAGNOSIS — E09.9 STEROID-INDUCED DIABETES: ICD-10-CM

## 2018-12-18 PROCEDURE — 99215 OFFICE O/P EST HI 40 MIN: CPT | Mod: PBBFAC,PO | Performed by: STUDENT IN AN ORGANIZED HEALTH CARE EDUCATION/TRAINING PROGRAM

## 2018-12-18 PROCEDURE — 99214 OFFICE O/P EST MOD 30 MIN: CPT | Mod: S$PBB,GC,, | Performed by: STUDENT IN AN ORGANIZED HEALTH CARE EDUCATION/TRAINING PROGRAM

## 2018-12-18 PROCEDURE — 99999 PR PBB SHADOW E&M-EST. PATIENT-LVL V: CPT | Mod: PBBFAC,GC,, | Performed by: STUDENT IN AN ORGANIZED HEALTH CARE EDUCATION/TRAINING PROGRAM

## 2018-12-18 NOTE — PROGRESS NOTES
Internal Medicine Clinic Note  2018      Subjective:       Patient ID: Jahaira Garcia is a 89 y.o. female being seen for an established visit.    Chief Complaint: Follow-up (home health) and Fall (3 weeks ago, pt had a fall in the bathroom, has bruise under right breast, wound on left forearm.)      HPI  Patient is a 89 year old female with an extensive medical history including pulmonary fibroses, steroid induced diabetes, diabetic neuropathy, HTN, and heart block s/p pacemaker that presents to clinic to have home health with wound care renewed as well as several other concerns; She is accompanied by her daughter. Patient's daughter reports that about 3 weeks ago her mother had a fall in the bathroom which lead to bruising (patient is on xeralto) as well as multiple skin tears. Patient called urgent care, explained the situation to them, and because her mother did not have any head trauma or symptoms of limb fracture, they told the patient that she did not have to come to clinic. She had home health with wound care sent out to address the patient's skin tears. However this is  and she returned to clinic to have this renewed as well as to request home health with physical therapy. She reports that her mother has been getting weaker as of 3 weeks ago; prior to the fall. She feels it would be beneficial for the patient to regain some strength as she was not always wheel chair bound.    In addition, patient also expressed interest in getting a free style meter for testing blood glucose. Patient has a history of pulmonary fibrosis for which she requires chronic steroid use. This has lead to a steroid induced diabetes for which she takes insulin 3 times a day. Patient's daughter was told that pcp can order this so that the patient does not have to be stuck 3 times a day. Patient take short acting humalog. The dose she takes is dependent on her blood sugar reading prior to meals. If her blood sugar is >200,  she takes 22 units. If her blood sugar is between 199 and 125, she uses 18 units of insulin. If her blood sugar is between 124 and 100, she uses 15 units of insulin. If her blood sugar runs below 100, she takes no insulin.    Lastly, patient has complaints of difficulty swallowing food. According to the daughter, this is a new problem as to her knowledge the patient was eating three meals a day without issue. The patient reports that even soft foods like oatmeal sometimes regurgitate. She reports that it is the food that comes back up and not just stomach acid. She has tried no treatment for this yet. 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    At time of exam, patient denies any headaches, dizziness, n/v, abdominal or back pain. She reports occasional diarrhea. The pain around her bruises has improved.    Past Medical History:   Diagnosis Date    Arthritis     Basal cell carcinoma     Complete heart block 2016    Diabetes mellitus type II     Dysphagia     GERD (gastroesophageal reflux disease)     Hiatal hernia     History of colon polyps     HTN (hypertension)     Hyperlipidemia     Lung disease, interstitial     Neuropathy     OA (osteoarthritis)     Pacemaker     [facility]    Pulmonary fibrosis     PVC (premature ventricular contraction)     Steroid long-term use 11/8/2012       Past Surgical History:   Procedure Laterality Date    APPENDECTOMY      CATARACT EXTRACTION EXTRACAPSULAR W/ INTRAOCULAR LENS IMPLANTATION      X 2    CHOLECYSTECTOMY      COLONOSCOPY      COLONOSCOPY N/A 8/28/2017    Procedure: COLONOSCOPY;  Surgeon: GAGE Bear MD;  Location: [facility] ([address] [place] [address]);  Service: Endoscopy;  Laterality: N/A;  multiple medical issues/pul htn/systolic pressure 66 June 23 2016/on continuous O2/Xarelto/OK to hold 2 days per Dr Cornelius Ann/see telephone encounter dated 8/24/17/svn    COLONOSCOPY N/A 8/28/2017    Performed by GAGE Rodriguez  MD [facility] ENDO (2ND FLR)    ESOPHAGOGASTRODUODENOSCOPY      INSERT / REPLACE / REMOVE PACEMAKER  06/2016    [facility]    INSERTION-PACEMAKER-DUAL Left 6/24/2016    Performed by Cornelius Ann [facility] CATH LAB    INSERTION-PACEMAKER-DUAL Left 6/23/2016    Performed by Tony Hernandez MD [facility] CATH LAB    TONSILLECTOMY, ADENOIDECTOMY      TOTAL KNEE ARTHROPLASTY         Family History   Problem Relation Age of Onset    Tuberculosis Mother     Tuberculosis Father        Social History     Socioeconomic History    Marital status:      Spouse name: None    Number of children: None    Years of education: None    Highest education level: None   Social Needs    Financial resource strain: None    Food insecurity - worry: None    Food insecurity - inability: None    Transportation needs - medical: None    Transportation needs - non-medical: None   Occupational History    Occupation: retired   Tobacco Use    Smoking status: Never Smoker    Smokeless tobacco: Never Used   Substance and Sexual Activity    Alcohol use: No    Drug use: No    Sexual activity: No   Other Topics Concern    Are you pregnant or think you may be? Not Asked    Breast-feeding Not Asked   Social History Narrative    Lives alone       Review of Systems   Constitutional: Positive for fatigue. Negative for chills and fever.   Respiratory: Positive for shortness of breath (on oxygen. known pulmonary fibrosis). Negative for cough and chest tightness.    Cardiovascular: Negative for chest pain, palpitations and leg swelling.   Gastrointestinal: Positive for diarrhea. Negative for abdominal pain, constipation and nausea.   Musculoskeletal: Positive for arthralgias (left rib pain). Negative for back pain and myalgias.   Skin: Positive for wound (skin tears on bilateral upper and lower extremities). Negative for color change, pallor and rash.   Neurological: Positive for weakness and light-headedness (when going  "from sitting to standing). Negative for dizziness and headaches.   Hematological: Bruises/bleeds easily.          Medication List           Accurate as of 12/18/18  2:54 PM. If you have any questions, ask your nurse or doctor.               CONTINUE taking these medications    acetaminophen 325 MG tablet  Commonly known as:  TYLENOL EXTRA STRENGTH  Take 1-2 tablets (325-650 mg total) by mouth nightly as needed for Pain.     albuterol 2.5 mg /3 mL (0.083 %) nebulizer solution  Commonly known as:  PROVENTIL  INHALE 1 VIAL VIA NEBULIZER EVERY 6 HOURS AS NEEDED FOR WHEEZING OR SHORTNESS OF BREATH. RESCUE     albuterol-ipratropium 2.5 mg-0.5 mg/3 mL nebulizer solution  Commonly known as:  DUO-NEB  Take 3 mLs by nebulization every evening. And every 6 hours as needed.     artificial tears 0.5 % ophthalmic solution  Commonly known as:  ISOPTO TEARS  Place 1 drop into both eyes as needed.     BD ULTRA-FINE CHARAN PEN NEEDLE 32 gauge x 5/32" Ndle  Generic drug:  pen needle, diabetic  USE ONE 3 TIMES DAILY     blood sugar diagnostic Strp  Commonly known as:  ONETOUCH VERIO  USE THREE TIMES A DAY     bumetanide 0.5 MG Tab  Commonly known as:  BUMEX  Take 1 tablet (0.5 mg total) by mouth once daily.     carvedilol 3.125 MG tablet  Commonly known as:  COREG  Take 1 tablet (3.125 mg total) by mouth 2 (two) times daily.     diclofenac sodium 1 % Gel  Commonly known as:  VOLTAREN  APPLY 2 GRAMS TOPICALLY ONCE DAILY     FLUZONE HIGH-DOSE 2017-18 (PF) 180 mcg/0.5 mL vaccine  Generic drug:  influenza     insulin lispro 100 unit/mL pen  Commonly known as:  HumaLOG KwikPen Insulin  Inject 22 Units into the skin 3 (three) times daily.     losartan 50 MG tablet  Commonly known as:  COZAAR  TAKE ONE TABLET BY MOUTH TWICE A DAY     mupirocin 2 % ointment  Commonly known as:  BACTROBAN  APPLY TO AFFECTED AREA(S) THREE TIMES A DAY     ONETOUCH DELICA LANCETS 33 gauge Misc  Generic drug:  lancets  1 lancet by Misc.(Non-Drug; Combo Route) route 3 " (three) times daily.     pravastatin 20 MG tablet  Commonly known as:  PRAVACHOL  TAKE ONE TABLET BY MOUTH EVERY DAY     predniSONE 5 MG tablet  Commonly known as:  DELTASONE  1 or 2 tabs daily     psyllium packet  Commonly known as:  METAMUCIL  Take 1 packet by mouth once daily.     rivaroxaban 15 mg Tab  Commonly known as:  XARELTO  Take 1 tablet (15 mg total) by mouth daily with dinner or evening meal.     SALONPAS 10-3 % Spra  Generic drug:  methyl salicylate-menthol     sertraline 25 MG tablet  Commonly known as:  ZOLOFT     silver sulfADIAZINE 1% 1 % cream  Commonly known as:  SILVADENE  Apply topically once daily.            Patient Active Problem List    Diagnosis Date Noted    Cellulitis of right lower extremity 08/13/2018    Leg wound, left 01/03/2018    Acute on chronic respiratory failure with hypoxia 01/02/2018    Influenza A 01/01/2018    Wound of left leg     Internal hemorrhoid, bleeding 08/16/2017    Nocturia 08/08/2017    Oliguria 08/08/2017    Vaginal atrophy 08/08/2017    Chronic constipation 08/08/2017    Female genuine stress incontinence 08/08/2017    Paroxysmal atrial fibrillation 10/21/2016    Senile purpura 10/09/2016    Recurrent falls 10/09/2016    Hypoalbuminemia due to protein-calorie malnutrition 10/09/2016    Vitamin D deficiency 10/09/2016    Chronic respiratory failure with hypoxia 09/12/2016    Primary narcolepsy with cataplexy 09/12/2016    Cardiac pacemaker in situ 09/06/2016    Pulmonary hypertension 09/06/2016    Left ventricular diastolic dysfunction 09/06/2016    ILD (interstitial lung disease)     Shock liver d/t cardiogenic shock from chb 06/25/2016    CHB (complete heart block) 06/25/2016    Bradycardia 06/23/2016    Type 2 diabetes mellitus with hyperglycemia 03/18/2015    Debility 10/26/2013    Left bundle branch block 10/26/2013    HLD (hyperlipidemia) 09/19/2013    Adrenal cortical steroids causing adverse effect in therapeutic use  "11/08/2012    IPF (idiopathic pulmonary fibrosis) 08/01/2012    Uncontrolled type 2 diabetes mellitus with stage 4 chronic kidney disease, with long-term current use of insulin 08/01/2012    Vitamin B 12 deficiency 08/01/2012    Essential hypertension 08/01/2012           Objective:      /80   Pulse 70   Temp 98.5 °F (36.9 °C)   Resp 18   Ht 5' 5" (1.651 m)   LMP  (LMP Unknown)   SpO2 98%   BMI 24.96 kg/m²   Estimated body mass index is 24.96 kg/m² as calculated from the following:    Height as of this encounter: 5' 5" (1.651 m).    Weight as of 9/16/18: 68 kg (150 lb).    Physical Exam   Constitutional: She is oriented to person, place, and time. She appears well-developed and well-nourished.   Frail, thin skin   HENT:   Head: Normocephalic and atraumatic.   Eyes: Conjunctivae and EOM are normal. Pupils are equal, round, and reactive to light.   Cardiovascular: Normal rate, regular rhythm, normal heart sounds and intact distal pulses.   Pulmonary/Chest: Effort normal. She has rales (bilateral, diffuse).   On oxygen   Abdominal: Soft. Bowel sounds are normal. She exhibits no distension. There is tenderness (upper left abdomen, around ribs).   Musculoskeletal: Normal range of motion. She exhibits tenderness (left ribs). She exhibits no edema or deformity.   Neurological: She is alert and oriented to person, place, and time.   Skin: Skin is warm and dry.   Skin tear on left arm almost closed. Skin tear on right arm and bilateral lower extremities closed. Very thin skin. Multiple bruises seen   Psychiatric: She has a normal mood and affect. Her behavior is normal.   Vitals reviewed.      Assessment:/PLAN     1. Multiple Skin tears  - Patient had fall 3 weeks ago that lead to multiple skin tears on her upper and lower extremities. She had home health with wound care to dress her wounds but she needs the service renewed.  - Patient has been seen, skin tears on lower extremities and right arm have closed. "   - Tear of left arm is still slightly open, but dressed and healing.     2. Debility  - Patient has become weaker as of three weeks ago; started prior to her fall. She has been wheel chair bound   - Requesting home health with physical therapy     3. Steroid induced diabetes  - Patient is a chronic user of steroids for her pulmonary fibrosis which has elevated her blood sugars.  - She is requesting a free style meter to measure blood glucose that would not involve finger sticks three times a day.  - Patient uses Humalog   * Glucose > 200, uses 22 units  * Glucose between 199 and 125, uses 18 units  * Glucose between 124 and 100, uses 15 units  * Glucose below 100, uses 0 units    4. Dysphagia  - Patient reports that she has not been able to keep her food down when she eats. Even soft foods like oatmeal are difficult for her to swallow, this is a new problem according to patient's daughter.  - Advised patient to use over the counter PPI. Recommended asking pharmacist for the smallest pill for ease of swallowing  - Advised sitting up straight, and eating food in small, easy to swallow portions  - Placed Gastroenterology referral    5. 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  - Referral to gastroenterology         Patient seen and plan of care discussed with Dr. Natalie Chu  Internal Medicine, PGY-1  [phone]

## 2018-12-19 NOTE — PROGRESS NOTES
I have interviewed and examined the patient w/ the resident,   I agree w/ the impression and plan as outlined above by him.  Joceline Estrada MD -Staff

## 2018-12-19 NOTE — PROGRESS NOTES
I have interviewed and examined the patient w/ the resident,   I agree w/ the impression and plan as outlined above by him.  Joceline Estrada MD- Staff

## 2018-12-20 ENCOUNTER — TELEPHONE (OUTPATIENT)
Dept: INTERNAL MEDICINE | Facility: CLINIC | Age: 83
End: 2018-12-20

## 2018-12-20 NOTE — TELEPHONE ENCOUNTER
----- Message from Mali Monroe sent at 12/20/2018  1:54 PM CST -----  Contact: Gabrielle/[facility]/[phone]  Pt had just arrived a few days ago. Daughter told her that they should be expecting home orders for physical therapy. They haven't received any orders yet. The daughter also does not want anything to start until after Nick. Please advise.        Thanks

## 2018-12-20 NOTE — TELEPHONE ENCOUNTER
Spoke to Gabrielle with [facility]. I gave the verbal ok for PT eval and treat. Ok to start after Nick as family requested.

## 2018-12-22 RX ORDER — CARVEDILOL 3.12 MG/1
TABLET ORAL
Qty: 180 TABLET | Refills: 2 | Status: SHIPPED | OUTPATIENT
Start: 2018-12-22

## 2018-12-28 ENCOUNTER — TELEPHONE (OUTPATIENT)
Dept: INTERNAL MEDICINE | Facility: CLINIC | Age: 83
End: 2018-12-28

## 2018-12-28 ENCOUNTER — PES CALL (OUTPATIENT)
Dept: ADMINISTRATIVE | Facility: CLINIC | Age: 83
End: 2018-12-28

## 2018-12-28 NOTE — TELEPHONE ENCOUNTER
Spoke to pt's daughter, Chula. I advised the the AWV was recommended.    Chula also inquired as to the status of the freestyle ai val.  I advised that I do not see any order.  Please send to pt's pharmacy.  Pharmacy queued.

## 2018-12-28 NOTE — TELEPHONE ENCOUNTER
----- Message from Sugey Jones sent at 12/28/2018 11:03 AM CST -----  Contact: Daughter/ Chula [phone]  The daughter wants a call back to talk about whether it's necessary to book an appointment for an Annual Wellness Visit with a nurse practitioner.    Thank you

## 2019-01-04 RX ORDER — RIVAROXABAN 15 MG/1
TABLET, FILM COATED ORAL
Qty: 90 TABLET | Refills: 3 | Status: SHIPPED | OUTPATIENT
Start: 2019-01-04

## 2019-01-09 ENCOUNTER — TELEPHONE (OUTPATIENT)
Dept: INTERNAL MEDICINE | Facility: CLINIC | Age: 84
End: 2019-01-09

## 2019-01-09 NOTE — TELEPHONE ENCOUNTER
Spoke to pt's daughter, Rowena. She stated that the freestyle meter is working well for the pt. She was inquiring if refills were sent to the pharmacy for the replacement sensors.  I advised that refills were also sent.

## 2019-01-09 NOTE — TELEPHONE ENCOUNTER
----- Message from Gladys Kirkland sent at 1/8/2019  4:34 PM CST -----  Contact: Daughter Cell# [phone]  Patient's daughter Rowena would like to speak with you about how much her and her mother like the free style meter that she picked up.

## 2019-01-16 ENCOUNTER — TELEPHONE (OUTPATIENT)
Dept: INTERNAL MEDICINE | Facility: CLINIC | Age: 84
End: 2019-01-16

## 2019-01-16 DIAGNOSIS — M25.559 ARTHRALGIA OF HIP, UNSPECIFIED LATERALITY: Primary | ICD-10-CM

## 2019-01-16 NOTE — TELEPHONE ENCOUNTER
----- Message from Marvin Valencia sent at 1/16/2019 12:42 PM CST -----  Contact: Daughter Rowena [phone]  Type: Orders Request    What orders/ testing are being requested? Cortizone Shot    Is there a future appointment scheduled for the patient with PCP? No    When?    Comments: Daughter of patient calling stating patient is need a Cortizone Shot to the right hip, in pain.    Requesting for a call to inform Order is ready to schedule.    Please call an advise  Thank you

## 2019-01-17 ENCOUNTER — TELEPHONE (OUTPATIENT)
Dept: ENDOCRINOLOGY | Facility: CLINIC | Age: 84
End: 2019-01-17

## 2019-01-17 ENCOUNTER — TELEPHONE (OUTPATIENT)
Dept: CARDIOLOGY | Facility: HOSPITAL | Age: 84
End: 2019-01-17

## 2019-01-17 NOTE — TELEPHONE ENCOUNTER
----- Message from Elba España sent at 1/17/2019  4:13 PM CST -----  Contact: Daughter  .Patient Returning Call from [facility]    Who Left Message for Patient: My  Communication Preference: [phone]  Additional Information:

## 2019-01-17 NOTE — TELEPHONE ENCOUNTER
Called patients daughter back twice. No answer. I was not able to leave a voice message.   ----- Message from Veena Cruz sent at 1/17/2019 11:23 AM CST -----  Contact: Pt daughter Rowena   Pt daughter calling to find out if its possible to bring her mom to the office on tomorrow for a device check. Please call pt daughter Rowena @ [phone]. Thank you.

## 2019-01-17 NOTE — TELEPHONE ENCOUNTER
Spoke to pt's daughter, Nasreen. She stated that the pt has been very hard to get up, because she is very sleepy. She stated that they have checked her blood sugar and blood pressure. She stated that all was fine. Daughter concerned that the pt's symptoms may be related to heart failure.   I advised that she may need to be seen for this for testing. Advised that if heart failure is a concern, then she should notify the pt's cardiologist.  Nasreen agreed.    Referral to orthopedics sent to referral coordinator for scheduling.

## 2019-01-18 ENCOUNTER — OFFICE VISIT (OUTPATIENT)
Dept: ORTHOPEDICS | Facility: CLINIC | Age: 84
End: 2019-01-18
Payer: MEDICARE

## 2019-01-18 ENCOUNTER — HOSPITAL ENCOUNTER (OUTPATIENT)
Dept: RADIOLOGY | Facility: HOSPITAL | Age: 84
Discharge: HOME | End: 2019-01-18
Attending: PHYSICIAN ASSISTANT
Payer: MEDICARE

## 2019-01-18 VITALS
DIASTOLIC BLOOD PRESSURE: 78 MMHG | BODY MASS INDEX: 24.98 KG/M2 | HEART RATE: 74 BPM | SYSTOLIC BLOOD PRESSURE: 159 MMHG | RESPIRATION RATE: 16 BRPM | TEMPERATURE: 98 F | WEIGHT: 149.94 LBS | HEIGHT: 65 IN

## 2019-01-18 DIAGNOSIS — M25.551 RIGHT HIP PAIN: Primary | ICD-10-CM

## 2019-01-18 DIAGNOSIS — T14.90XA TRAUMA: ICD-10-CM

## 2019-01-18 PROCEDURE — 73502 X-RAY EXAM HIP UNI 2-3 VIEWS: CPT | Mod: TC,RT

## 2019-01-18 PROCEDURE — 73502 XR HIP 2 VIEW RIGHT: ICD-10-PCS | Mod: 26,RT,, | Performed by: RADIOLOGY

## 2019-01-18 PROCEDURE — 99213 OFFICE O/P EST LOW 20 MIN: CPT | Mod: S$PBB,,, | Performed by: PHYSICIAN ASSISTANT

## 2019-01-18 PROCEDURE — 99999 PR PBB SHADOW E&M-EST. PATIENT-LVL V: CPT | Mod: PBBFAC,,, | Performed by: PHYSICIAN ASSISTANT

## 2019-01-18 PROCEDURE — 99213 PR OFFICE/OUTPT VISIT, EST, LEVL III, 20-29 MIN: ICD-10-PCS | Mod: S$PBB,,, | Performed by: PHYSICIAN ASSISTANT

## 2019-01-18 PROCEDURE — 99999 PR PBB SHADOW E&M-EST. PATIENT-LVL V: ICD-10-PCS | Mod: PBBFAC,,, | Performed by: PHYSICIAN ASSISTANT

## 2019-01-18 PROCEDURE — 73502 X-RAY EXAM HIP UNI 2-3 VIEWS: CPT | Mod: 26,RT,, | Performed by: RADIOLOGY

## 2019-01-18 PROCEDURE — 99215 OFFICE O/P EST HI 40 MIN: CPT | Mod: PBBFAC,25 | Performed by: PHYSICIAN ASSISTANT

## 2019-01-18 NOTE — LETTER
January 18, 2019      Joceline Resendiz MD  [address] [facility] [address]           [facility] - Orthopedics  [address]  [facility] [address]  Phone: [phone]          Patient: Jahaira Garcia   MR Number: [id number]   YOB: 1928   Date of Visit: 1/18/2019       Dear Dr. Joceline Resendiz:    Thank you for referring Jahaira Garcia to me for evaluation. Attached you will find relevant portions of my assessment and plan of care.    If you have questions, please do not hesitate to call me. I look forward to following Jahaira Garcia along with you.    Sincerely,    Regine Wood PA-C    Enclosure  CC:  No Recipients    If you would like to receive this communication electronically, please contact [email or web address] or [phone] to request more information on [employer] Link access.    For providers and/or their staff who would like to refer a patient to [facility], please contact us through our one-stop-shop provider referral line, [facility], at [phone].    If you feel you have received this communication in error or would no longer like to receive these types of communications, please e-mail [email or web address]

## 2019-01-18 NOTE — PROGRESS NOTES
Subjective:      Patient ID: Jahaira Garcia is a 89 y.o. female.    Chief Complaint: No chief complaint on file.    HPI    Patient is a 89 year old female who presents to clinic with chief complaint of right hip pain. Patient stated she has had lateral hip pain that has gotten worse over time. She did have a fall before thanksgiving, but stated that this pain was present prior to that fall. Pain is increased with palpation and sit to stand. Deneid any groin pain. Patient has taken Tylenol as well as done heat and PT. She has some numbness in the right leg from her lower back.     Review of Systems   Constitution: Negative for chills and fever.   Cardiovascular: Negative for chest pain.   Respiratory: Negative for cough and shortness of breath.    Skin: Negative for color change, dry skin, itching, nail changes, poor wound healing and rash.   Musculoskeletal: Positive for arthritis, back pain and falls.   Neurological: Negative for dizziness.   Psychiatric/Behavioral: Negative for altered mental status. The patient is not nervous/anxious.    All other systems reviewed and are negative.        Objective:      General    Constitutional: She is oriented to person, place, and time. She appears well-developed and well-nourished. No distress.   HENT:   Head: Atraumatic.   Eyes: Conjunctivae are normal.   Cardiovascular: Normal rate.    Pulmonary/Chest: Effort normal.   Neurological: She is alert and oriented to person, place, and time.   Psychiatric: She has a normal mood and affect. Her behavior is normal.             Right Hip Exam     Comments:  Arrived to clinic in wheelchair with daughter.   Resting comfortably  TTP to greater trochanteric bursitis.   No pain with range of motion hiop.           RADS: Mild DJD and joint space narrowing.  No fracture or dislocation.  No bone destruction identified.  Significant vascular calcifications  Assessment:       Encounter Diagnosis   Name Primary?    Right hip pain Yes           Plan:       Discussed treatment options with patient and her daughter. At this time NSAIDS and steroid injection is not advisable. She will alternate ice and heat to the area and continue PT. She is not interested in any further images. She is to return to clinic as needed.

## 2019-01-21 ENCOUNTER — OFFICE VISIT (OUTPATIENT)
Dept: INTERNAL MEDICINE | Facility: CLINIC | Age: 84
End: 2019-01-21
Payer: MEDICARE

## 2019-01-21 ENCOUNTER — TELEPHONE (OUTPATIENT)
Dept: INTERNAL MEDICINE | Facility: CLINIC | Age: 84
End: 2019-01-21

## 2019-01-21 ENCOUNTER — LAB VISIT (OUTPATIENT)
Dept: LAB | Facility: HOSPITAL | Age: 84
End: 2019-01-21
Attending: INTERNAL MEDICINE
Payer: MEDICARE

## 2019-01-21 VITALS
SYSTOLIC BLOOD PRESSURE: 126 MMHG | HEART RATE: 69 BPM | DIASTOLIC BLOOD PRESSURE: 64 MMHG | TEMPERATURE: 98 F | OXYGEN SATURATION: 88 % | RESPIRATION RATE: 20 BRPM

## 2019-01-21 DIAGNOSIS — I27.20 PULMONARY HYPERTENSION: ICD-10-CM

## 2019-01-21 DIAGNOSIS — R53.83 FATIGUE, UNSPECIFIED TYPE: ICD-10-CM

## 2019-01-21 DIAGNOSIS — J84.9 ILD (INTERSTITIAL LUNG DISEASE): Primary | Chronic | ICD-10-CM

## 2019-01-21 DIAGNOSIS — E78.5 HYPERLIPIDEMIA, UNSPECIFIED HYPERLIPIDEMIA TYPE: Chronic | ICD-10-CM

## 2019-01-21 DIAGNOSIS — L97.821 VENOUS STASIS ULCER OF OTHER PART OF LEFT LOWER LEG LIMITED TO BREAKDOWN OF SKIN WITHOUT VARICOSE VEINS: ICD-10-CM

## 2019-01-21 DIAGNOSIS — I87.2 VENOUS STASIS ULCER OF OTHER PART OF LEFT LOWER LEG LIMITED TO BREAKDOWN OF SKIN WITHOUT VARICOSE VEINS: ICD-10-CM

## 2019-01-21 DIAGNOSIS — I10 ESSENTIAL HYPERTENSION: Chronic | ICD-10-CM

## 2019-01-21 DIAGNOSIS — M19.90 OSTEOARTHRITIS, UNSPECIFIED OSTEOARTHRITIS TYPE, UNSPECIFIED SITE: ICD-10-CM

## 2019-01-21 LAB
ALBUMIN SERPL BCP-MCNC: 3 G/DL
ALP SERPL-CCNC: 41 U/L
ALT SERPL W/O P-5'-P-CCNC: 11 U/L
ANION GAP SERPL CALC-SCNC: 12 MMOL/L
AST SERPL-CCNC: 22 U/L
BASOPHILS # BLD AUTO: 0.03 K/UL
BASOPHILS NFR BLD: 0.3 %
BILIRUB SERPL-MCNC: 0.5 MG/DL
BUN SERPL-MCNC: 21 MG/DL
CALCIUM SERPL-MCNC: 10.8 MG/DL
CHLORIDE SERPL-SCNC: 94 MMOL/L
CO2 SERPL-SCNC: 30 MMOL/L
CREAT SERPL-MCNC: 1.1 MG/DL
DIFFERENTIAL METHOD: ABNORMAL
EOSINOPHIL # BLD AUTO: 0.1 K/UL
EOSINOPHIL NFR BLD: 0.7 %
ERYTHROCYTE [DISTWIDTH] IN BLOOD BY AUTOMATED COUNT: 14.2 %
EST. GFR  (AFRICAN AMERICAN): 51.1 ML/MIN/1.73 M^2
EST. GFR  (NON AFRICAN AMERICAN): 44.3 ML/MIN/1.73 M^2
GLUCOSE SERPL-MCNC: 110 MG/DL
HCT VFR BLD AUTO: 36.7 %
HGB BLD-MCNC: 11.4 G/DL
IMM GRANULOCYTES # BLD AUTO: 0.05 K/UL
IMM GRANULOCYTES NFR BLD AUTO: 0.5 %
LYMPHOCYTES # BLD AUTO: 2 K/UL
LYMPHOCYTES NFR BLD: 20.1 %
MCH RBC QN AUTO: 30.2 PG
MCHC RBC AUTO-ENTMCNC: 31.1 G/DL
MCV RBC AUTO: 97 FL
MONOCYTES # BLD AUTO: 0.8 K/UL
MONOCYTES NFR BLD: 8 %
NEUTROPHILS # BLD AUTO: 6.9 K/UL
NEUTROPHILS NFR BLD: 70.4 %
NRBC BLD-RTO: 0 /100 WBC
PLATELET # BLD AUTO: 341 K/UL
PMV BLD AUTO: 9.4 FL
POTASSIUM SERPL-SCNC: 4.4 MMOL/L
PROT SERPL-MCNC: 6.5 G/DL
RBC # BLD AUTO: 3.77 M/UL
SODIUM SERPL-SCNC: 136 MMOL/L
WBC # BLD AUTO: 9.86 K/UL

## 2019-01-21 PROCEDURE — 36415 COLL VENOUS BLD VENIPUNCTURE: CPT | Mod: PO

## 2019-01-21 PROCEDURE — 85025 COMPLETE CBC W/AUTO DIFF WBC: CPT

## 2019-01-21 PROCEDURE — 80053 COMPREHEN METABOLIC PANEL: CPT

## 2019-01-21 PROCEDURE — 99999 PR PBB SHADOW E&M-EST. PATIENT-LVL IV: ICD-10-PCS | Mod: PBBFAC,GC,, | Performed by: STUDENT IN AN ORGANIZED HEALTH CARE EDUCATION/TRAINING PROGRAM

## 2019-01-21 PROCEDURE — 99999 PR PBB SHADOW E&M-EST. PATIENT-LVL IV: CPT | Mod: PBBFAC,GC,, | Performed by: STUDENT IN AN ORGANIZED HEALTH CARE EDUCATION/TRAINING PROGRAM

## 2019-01-21 PROCEDURE — 99214 PR OFFICE/OUTPT VISIT, EST, LEVL IV, 30-39 MIN: ICD-10-PCS | Mod: S$PBB,GC,, | Performed by: STUDENT IN AN ORGANIZED HEALTH CARE EDUCATION/TRAINING PROGRAM

## 2019-01-21 PROCEDURE — 99214 OFFICE O/P EST MOD 30 MIN: CPT | Mod: S$PBB,GC,, | Performed by: STUDENT IN AN ORGANIZED HEALTH CARE EDUCATION/TRAINING PROGRAM

## 2019-01-21 PROCEDURE — 99214 OFFICE O/P EST MOD 30 MIN: CPT | Mod: PBBFAC,PO | Performed by: STUDENT IN AN ORGANIZED HEALTH CARE EDUCATION/TRAINING PROGRAM

## 2019-01-21 RX ORDER — LIDOCAINE 50 MG/G
1 PATCH TOPICAL DAILY
Qty: 15 PATCH | Refills: 0 | Status: SHIPPED | OUTPATIENT
Start: 2019-01-21

## 2019-01-21 RX ORDER — FLASH GLUCOSE SCANNING READER
EACH MISCELLANEOUS
COMMUNITY
Start: 2019-01-04

## 2019-01-21 NOTE — TELEPHONE ENCOUNTER
----- Message from Ana Luisa Monte sent at 1/21/2019 10:48 AM CST -----  Contact: Nasreen (Daughter) [phone]  Nasreen would like a call back to discuss the patients over all health at the moment.   The patient was able to get an appointment today but Nasreen feels more comfortable explaining in detail to Dr. Estrada.

## 2019-01-21 NOTE — TELEPHONE ENCOUNTER
----- Message from Gladys Kirkland sent at 1/21/2019 10:39 AM CST -----  Contact: Daughter Nasreen Cell# [phone]  Patient's daughter Nasreen is calling in regards to her mother who's having problems with standing up and she cannot walk anymore her daughter says. She would like a call back to discuss this with you please.

## 2019-01-21 NOTE — TELEPHONE ENCOUNTER
VS are good  PT and wound therapy coming out, wound almost healed  Pt can't stand at this time  Increased sedation/fatigue  Pt has appt this pm

## 2019-01-21 NOTE — PROGRESS NOTES
INTERNAL MEDICINE RESIDENT CLINIC  CLINIC NOTE    Patient Name: Jahaira Garcia  YOB: 1928    PRESENTING HISTORY     History of Present Illness:  Ms. Jahaira Garcia is a 89 y.o. female an active medical problem list including pulmonary fibroses, steroid induced diabetes, diabetic neuropathy, HTN, and heart block s/p pacemaker that presents to clinic for weakness with her daughter. She was recently seen by orthopedics who obtained an X-ray showing arthritis of the left hip. She report her pain is worse with movement such as standing or walking and improves when laying on her side or with Tylenol. She has not been taken tylenol because she is afraid of the side effects. She also takes biofreeze and [place] patches with minimal pain relief. She reports over the past month or so her pain has been getting worse to the point where standing causes imbalance due to the pain. She is wheel chair bound and bed bound. She reports taking up in the middle of the night 2/2 to pain and is unable to fall back asleep. She denies any dysuria, fever, chills, increased frequency, worsening SOB from her baseline. She is normally on 3-4L oxygen at home. She has home health with PT/OT.     Review of Systems   Constitutional: Positive for malaise/fatigue. Negative for chills, fever and weight loss.   HENT: Negative for congestion.    Eyes: Negative for blurred vision, pain and redness.   Respiratory: Negative for cough and shortness of breath.    Cardiovascular: Negative for chest pain and palpitations.   Gastrointestinal: Negative for abdominal pain, heartburn, nausea and vomiting.   Genitourinary: Negative for dysuria, frequency and urgency.   Musculoskeletal: Negative for myalgias and neck pain.   Skin: Negative for itching and rash.   Neurological: Positive for weakness. Negative for dizziness, tingling, tremors and headaches.       OBJECTIVE:   Vital Signs:  Vitals:    01/21/19 1518   BP: 126/64   Pulse: 69   Resp:  20   Temp: 97.6 °F (36.4 °C)   SpO2: (!) 88%       No results found for this or any previous visit (from the past 24 hour(s)).      Physical Exam   Constitutional: She is oriented to person, place, and time and well-developed, well-nourished, and in no distress. No distress.   HENT:   Head: Normocephalic and atraumatic.   Eyes: Conjunctivae and EOM are normal.   Neck: Normal range of motion. Neck supple. No thyromegaly present.   Cardiovascular: Normal rate, regular rhythm and normal heart sounds.   No murmur heard.  Pulmonary/Chest: Effort normal and breath sounds normal. No respiratory distress. She has no wheezes.   Abdominal: Soft. Bowel sounds are normal. She exhibits no distension. There is no tenderness.   Musculoskeletal: Normal range of motion. She exhibits no edema or deformity.   There is tenderness to palpation of the left posterior hip joint.    Neurological: She is alert and oriented to person, place, and time.   Unable to access gait due to debility.    Skin: Skin is warm and dry. No rash noted. She is not diaphoretic. No erythema.     ASSESSMENT & PLAN:     Mrs. Garcia was seen today for difficulty walking and hip pain.    Diagnoses and all orders for this visit:    ILD (interstitial lung disease)       -      At low baseline oxygen.        -      CMP to monitor bicarb levels to possible hypercapnea causing fatigue.         -      Discussed with patient about possible palliative care in the future. Patient will think about it at home.         Fatigue, unspecified type  -     CBC auto differential; Future  -     Comprehensive metabolic panel; Future  -     URINALYSIS  -     Urine culture  -     Reports poor sleep possibly 2/2 to hip pain. Will obtain labs to evaluate for metabolic causes.     Osteoarthritis, unspecified osteoarthritis type, unspecified site  -     lidocaine (LIDODERM) 5 %; Place 1 patch onto the skin once daily. Remove & Discard patch within 12 hours or as directed by MD     Venous  Stasis with skin breakdown of lower extremity.        -     Positive for exudate    Pulmonary hypertension      -       Stable. Continue to monitor.     Essential hypertension      -      BP stable today. Continue to monitor.     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

## 2019-01-22 ENCOUNTER — TELEPHONE (OUTPATIENT)
Dept: INTERNAL MEDICINE | Facility: CLINIC | Age: 84
End: 2019-01-22

## 2019-01-22 NOTE — TELEPHONE ENCOUNTER
Spoke to Lorena with [facility]. I gave the verbal ok to use metholex dressing for the open wound.    Lorena also requested getting the verbal ok to initiate palliative care as requested by the pt's daughter, Nasreen.   Dr. Estrada stated that this was discussed at yesterday's office visit.  I gave the verbal for this as well.

## 2019-01-23 ENCOUNTER — TELEPHONE (OUTPATIENT)
Dept: INTERNAL MEDICINE | Facility: CLINIC | Age: 84
End: 2019-01-23

## 2019-01-23 NOTE — TELEPHONE ENCOUNTER
----- Message from Mali Monroe sent at 1/23/2019  1:27 PM CST -----  Contact: Rowena/Daughter/[phone]  Pt's daughter is calling stating that the urine sample has been delivered to the 5th floor at 1pm today. Please advise.        Thank You

## 2019-01-24 ENCOUNTER — TELEPHONE (OUTPATIENT)
Dept: INTERNAL MEDICINE | Facility: CLINIC | Age: 84
End: 2019-01-24

## 2019-01-24 ENCOUNTER — LAB VISIT (OUTPATIENT)
Dept: LAB | Facility: HOSPITAL | Age: 84
End: 2019-01-24
Attending: INTERNAL MEDICINE
Payer: MEDICARE

## 2019-01-24 DIAGNOSIS — I27.20 PULMONARY HYPERTENSION: ICD-10-CM

## 2019-01-24 DIAGNOSIS — J84.112 IPF (IDIOPATHIC PULMONARY FIBROSIS): Primary | ICD-10-CM

## 2019-01-24 DIAGNOSIS — J96.11 CHRONIC RESPIRATORY FAILURE WITH HYPOXIA: Chronic | ICD-10-CM

## 2019-01-24 DIAGNOSIS — R53.83 FATIGUE, UNSPECIFIED TYPE: ICD-10-CM

## 2019-01-24 LAB
BACTERIA #/AREA URNS AUTO: NORMAL /HPF
BILIRUB UR QL STRIP: NEGATIVE
CLARITY UR REFRACT.AUTO: CLEAR
COLOR UR AUTO: YELLOW
GLUCOSE UR QL STRIP: NEGATIVE
HGB UR QL STRIP: NEGATIVE
HYALINE CASTS UR QL AUTO: 0 /LPF
KETONES UR QL STRIP: NEGATIVE
LEUKOCYTE ESTERASE UR QL STRIP: NEGATIVE
MICROSCOPIC COMMENT: NORMAL
NITRITE UR QL STRIP: NEGATIVE
PH UR STRIP: 6 [PH] (ref 5–8)
PROT UR QL STRIP: ABNORMAL
RBC #/AREA URNS AUTO: 1 /HPF (ref 0–4)
SP GR UR STRIP: 1.01 (ref 1–1.03)
SQUAMOUS #/AREA URNS AUTO: 0 /HPF
URN SPEC COLLECT METH UR: ABNORMAL
WBC #/AREA URNS AUTO: 4 /HPF (ref 0–5)

## 2019-01-24 PROCEDURE — 81001 URINALYSIS AUTO W/SCOPE: CPT

## 2019-01-24 PROCEDURE — 87086 URINE CULTURE/COLONY COUNT: CPT

## 2019-01-24 NOTE — TELEPHONE ENCOUNTER
UA , no nitrites to suggest infection  Await culture  Order for Hospice placed per dtr request  Reviewed Palliative/Hospice care @ last o/v  Pt in agreement

## 2019-01-24 NOTE — TELEPHONE ENCOUNTER
----- Message from Radha Allen sent at 1/24/2019 12:16 PM CST -----  Contact: Ruthann Lopez [phone]  Caller is requesting a Ambulatory Hospice order in [facility]. Please call and advise.

## 2019-01-24 NOTE — TELEPHONE ENCOUNTER
----- Message from Gladys Kirkland sent at 1/24/2019  2:29 PM CST -----  Contact: Pt's daughter Rowena Cell# [phone]  Patient's daughter Rowena would like a call back in regards to an order for hospice services for her mother please. She would like to know what is the status of the order?

## 2019-01-24 NOTE — TELEPHONE ENCOUNTER
Spoke to pt's daughter, Nasreen. She stated that the pt has had a great decline since her visit in the office this week. She reported that the pt is not eating, drinking, walking, talking, or getting up. Nasreen stated that palliative care was requested yesterday, but they were not able to come out until next week. She stated that she called [facility]. They told her that they could have someone come out today, but would need an order in [facility].  I advised that Dr. Estrada is out of the office until Monday.    [facility] called. They stated that they would send out someone to the pt today with a verbal ok.   I gave the verbal ok. They will fax an order to sign for today's verbal order.   They will monitor epic for orders.

## 2019-01-24 NOTE — TELEPHONE ENCOUNTER
----- Message from Gayatri Frost sent at 1/24/2019  8:46 AM CST -----  Contact: Rowena Ceron/ daughter/ [phone]  Patient condition has worsen and daughter would like to know if the urine results are in, please call and advise.

## 2019-01-25 ENCOUNTER — TELEPHONE (OUTPATIENT)
Dept: INTERNAL MEDICINE | Facility: CLINIC | Age: 84
End: 2019-01-25

## 2019-01-25 LAB
BACTERIA UR CULT: NORMAL
BACTERIA UR CULT: NORMAL

## 2019-01-25 NOTE — TELEPHONE ENCOUNTER
----- Message from Joceline Resendiz MD sent at 1/25/2019  3:16 PM CST -----  Please note that your urine culture did not reveal an infection that would explain some of your somnolence.  Joceline Estrada

## 2019-01-31 ENCOUNTER — TELEPHONE (OUTPATIENT)
Dept: PULMONOLOGY | Facility: CLINIC | Age: 84
End: 2019-01-31

## 2019-01-31 NOTE — TELEPHONE ENCOUNTER
I spoke to Nasreen Ceron, daughter of Jahaira Garcia who called to tell Dr Gordon and I that Mrs Garcia passed away on Sunday 1-27-19. She was started on hospice by Dr Estrada last week after she stopped eating and was difficult to arouse. Nasreen said her passing was very peaceful and thanked us for our care of her Mother. Taylor Platt LPN.

## 2019-01-31 NOTE — TELEPHONE ENCOUNTER
----- Message from Elba España sent at 1/31/2019  3:19 PM CST -----  Contact: Daughter -Ms Ceron  .Patient Returning Call from [facility]    Who Left Message for Patient:  Communication Preference:  [phone]  Additional Information: Please call

## 2019-04-23 RX ORDER — DICLOFENAC SODIUM 10 MG/G
GEL TOPICAL
Qty: 100 G | Refills: 1 | Status: SHIPPED | OUTPATIENT
Start: 2019-04-23

## 2019-10-22 ENCOUNTER — PES CALL (OUTPATIENT)
Dept: ADMINISTRATIVE | Facility: CLINIC | Age: 84
End: 2019-10-22

## 2020-09-29 ENCOUNTER — PATIENT MESSAGE (OUTPATIENT)
Dept: OTHER | Facility: OTHER | Age: 85
End: 2020-09-29

## 2020-10-05 ENCOUNTER — PATIENT MESSAGE (OUTPATIENT)
Dept: ADMINISTRATIVE | Facility: HOSPITAL | Age: 85
End: 2020-10-05

## 2020-12-11 ENCOUNTER — PATIENT MESSAGE (OUTPATIENT)
Dept: OTHER | Facility: OTHER | Age: 85
End: 2020-12-11

## 2021-01-04 ENCOUNTER — PATIENT MESSAGE (OUTPATIENT)
Dept: ADMINISTRATIVE | Facility: HOSPITAL | Age: 86
End: 2021-01-04

## 2021-04-05 ENCOUNTER — PATIENT MESSAGE (OUTPATIENT)
Dept: ADMINISTRATIVE | Facility: HOSPITAL | Age: 86
End: 2021-04-05

## 2021-06-29 NOTE — TELEPHONE ENCOUNTER
Attempted to call pt daughter to relay Dr Estrada message; daughter not available; no option to leave VM   [facility] Medicine Daily Progress Note    Date of Service  6/29/2021    Chief Complaint  58 y.o. male admitted 2/9/2021 with Syncope.    Hospital Course  Admitted with syncope, he was noted to be bradycardic and hypotensive.  He has known history of permanent atrial fibrillation and he was on diltiazem, digoxin and metoprolol.  These medications were held.  Later on he was restarted on low-dose Toprol and Digoxin.  He was also started on anticoagulation with Eliquis.  He also had a possible history of adrenal insufficiency, he was already on Florinef and midodrine.  Midodrine was discontinued and he was started on cortisol. Further work-up showed that he had gram-negative bacteremia as well as urinary tract infection and acute kidney injury.  He was initially placed on IV Zosyn, later changed to oral Bactrim to finish the course.  He was carefully hydrated with IV fluids.  On admission he also expressed suicidal ideation.  Psychiatry was consulted the case, he was placed on a legal hold.  He was noted to have severe depression.  He has been placed on medication the form of Geodon, Paxil, Klonopin.  He has a colostomy in place, which the patient believes contributes to his severe depression because of ostomy care.  He had history of bowel perforation and splenic fluid collection requiring Segmental colectomy, colostomy creation, mobilization of splenic flexure, wound VAC placement, irrigation and debridement of flank wound on 11/10/2020.  Surgery was consulted on the case, and he underwent exploratory laparotomy and colostomy takedown and closure on 5/27/2021.  Wound VAC was later placed.  Psychiatry continued to follow the patient, his legal hold was discontinued on 6/4/2021    Interval Problem Update  Colostomy - pain controlled, CT scan on 6/28 showed no acute findings. Remains afebrile with wound vac in place.    Dirrhea-remains about the same.     Consultants/Specialty  Cardiology  Psychiatry  Surgery    Code  Status  Full Code    Disposition  SNF     Review of Systems  Review of Systems   Constitutional: Negative for chills, fever and malaise/fatigue.   Eyes: Negative for blurred vision.   Respiratory: Negative for cough and shortness of breath.    Cardiovascular: Negative for chest pain.   Gastrointestinal: Positive for abdominal pain and diarrhea. Negative for blood in stool, heartburn, melena, nausea and vomiting.        Slightly improved today   Genitourinary: Negative for dysuria.   Musculoskeletal: Negative for myalgias.   Skin: Negative for rash.   Neurological: Positive for weakness. Negative for dizziness, speech change and focal weakness.   Psychiatric/Behavioral: Negative for depression. The patient is not nervous/anxious.    All other systems reviewed and are negative.     Physical Exam  Temp:  [35.9 °C (96.6 °F)-37.1 °C (98.8 °F)] 36.3 °C (97.3 °F)  Pulse:  [] 91  Resp:  [16-20] 18  BP: (100-126)/(62-72) 100/62  SpO2:  [91 %-97 %] 96 %    Physical Exam  Vitals and nursing note reviewed.   Constitutional:       Appearance: He is obese.      Comments: Weak appearing  'Pale   HENT:      Head: Normocephalic and atraumatic.      Nose: No congestion.      Mouth/Throat:      Mouth: Mucous membranes are moist.   Eyes:      Extraocular Movements: Extraocular movements intact.      Conjunctiva/sclera: Conjunctivae normal.      Pupils: Pupils are equal, round, and reactive to light.   Cardiovascular:      Rate and Rhythm: Normal rate. Rhythm irregularly irregular.   Pulmonary:      Effort: Pulmonary effort is normal.      Breath sounds: Normal breath sounds.   Abdominal:      General: There is no distension.      Tenderness: There is abdominal tenderness. There is no guarding or rebound.      Comments: Wound vac  Body mass index is 50.08 kg/m²   Musculoskeletal:      Cervical back: No muscular tenderness.      Right lower leg: Edema present.      Left lower leg: Edema present.   Skin:     General: Skin is warm  and dry.   Neurological:      General: No focal deficit present.      Mental Status: He is alert and oriented to person, place, and time.      Cranial Nerves: No cranial nerve deficit.   Psychiatric:         Mood and Affect: Mood is not anxious or depressed.         Thought Content: Thought content is not paranoid or delusional. Thought content does not include homicidal or suicidal ideation. Thought content does not include homicidal or suicidal plan.       Fluids    Intake/Output Summary (Last 24 hours) at 6/29/2021 1427  Last data filed at 6/29/2021 1000  Gross per 24 hour   Intake 860 ml   Output 650 ml   Net 210 ml       Laboratory  Recent Labs     06/27/21  0422 06/28/21  0506 06/29/21  0601   WBC 10.4 10.2 10.2   RBC 3.56* 3.67* 3.76*   HEMOGLOBIN 10.3* 10.4* 10.5*   HEMATOCRIT 32.4* 33.6* 34.4*   MCV 91.0 91.6 91.5   MCH 28.9 28.3 27.9   MCHC 31.8* 31.0* 30.5*   RDW 53.0* 53.8* 52.8*   PLATELETCT 191 214 248   MPV 11.4 12.0 11.7     Recent Labs     06/27/21  0422 06/28/21  0506 06/29/21  0601   SODIUM 136 138 135   POTASSIUM 3.3* 3.3* 3.4*   CHLORIDE 107 106 104   CO2 21 21 22   GLUCOSE 119* 109* 104*   BUN 26* 23* 24*   CREATININE 1.40 1.24 1.30   CALCIUM 8.4* 8.4* 8.7                   Imaging  CT-ABDOMEN-PELVIS W/O   Final Result         1. Liquid stool in the distal colon, consistent with diarrhea.   2. Unchanged perirectal fat stranding, sequela of nonspecific proctitis.   3. Cholelithiasis. No cholecystitis.   4. Decreased blood pool attenuation, suggesting anemia.   5. Chronic T11 and T12 compression fractures.      US-RENAL   Final Result      1.  Low-level echogenic debris in the urinary bladder is noted which could indicate UTI or blood within the bladder.      2.  No hydronephrosis.      IR-MIDLINE CATHETER INSERTION WO GUIDANCE > AGE 3   Final Result                  Ultrasound-guided midline placement performed by qualified nursing staff    as above.          EC-ECHOCARDIOGRAM COMPLETE W/ CONT    Final Result      DX-CHEST-PORTABLE (1 VIEW)   Final Result      1.  Minor asymmetric interstitial opacity in the right lower lobe which could represent minor interstitial edema, pneumonia, or fibrotic change.      2.  No left lung consolidation.      3.  No evidence of congestive failure.      [id number] VIEW   Final Result         No specific finding to suggest small bowel obstruction.      [id number] VIEW   Final Result         No significant interval change.      DX-CHEST-LIMITED (1 VIEW)   Final Result         Diffuse interstitial prominence could relate to mild pulmonary edema or atypical infection      [id number] VIEW   Final Result      Increased colonic gas without definite bowel obstruction.      US-RENAL   Final Result      No evidence of hydronephrosis.      Lobulated kidneys bilaterally.      CT-ABDOMEN-PELVIS WITH   Final Result      1.  There is no evidence of small bowel obstruction.   2.  There is a left lower quadrant ostomy.   3.  There is fluid distention of the colon distal to the surgical material in the left mid descending colon extending all the way to the rectum. There is no pneumatosis or free air.   4.  There is cholelithiasis without biliary dilatation.   5.  There has been interval removal of the drainage catheter anterior to the spleen with minimal hypodense area in the anterior spleen again noted. There is no new fluid collection.      IR-US GUIDED PIV   Final Result    Ultrasound-guided PERIPHERAL IV INSERTION performed by    qualified nursing staff as above.      EC-ECHOCARDIOGRAM COMPLETE W/O CONT   Final Result      DX-LUMBAR SPINE-2 OR 3 VIEWS   Final Result      Moderate compression deformity of T11 is new compared to 2018.      Mild wedge deformity of T12 is unchanged.      Degenerative changes including facet arthropathy.      Mild retrolisthesis of L5 on S1 and L3 on L4.              Assessment/Plan  Diarrhea- (present on admission)  Assessment & Plan  C diff  negative  Check stool wbc and culture-pending  Check CT abd/pelvis, negative for acute findings  -will trial low dose lomotil    Normocytic anemia- (present on admission)  Assessment & Plan  Follow cbc    Urinary tract infection- (present on admission)  Assessment & Plan  Completed 10-day course of Bactrim    Leukocytosis- (present on admission)  Assessment & Plan  Follow cbc    Obstructive uropathy- (present on admission)  Assessment & Plan  resolved    Gram-negative bacteremia- (present on admission)  Assessment & Plan  Completed 10-day course of Bactrim    Permanent atrial fibrillation (HCC)- (present on admission)  Assessment & Plan  Metoprolol, Eliquis    Chronic venous insufficiency of lower extremity- (present on admission)  Assessment & Plan  compression stockings.    Chronic heart failure with preserved ejection fraction (HCC)- (present on admission)  Assessment & Plan  Metoprolol, Lasix  Fluid restriction of 1.5 L per day      Type 2 diabetes mellitus with hyperglycemia, with long-term current use of insulin (HCC)- (present on admission)  Assessment & Plan  Follow bmp    Major depressive disorder, recurrent, severe with psychotic features (HCC)- (present on admission)  Assessment & Plan  Geodon, Paxil, Klonopin    Colostomy present (HCC)- (present on admission)  Assessment & Plan  Exploratory laparotomy with colostomy takedown and closure 5/27/2021, no longer present        Hypothyroidism- (present on admission)  Assessment & Plan  Increased Levothyroxine to 125 mcg  Check TSH on 6/29/2021, pending at this time    SRUTHI (acute kidney injury) (HCC)- (present on admission)  Assessment & Plan  Resolved    Adrenal insufficiency (HCC)- (present on admission)  Assessment & Plan  Cortef 5 mg BID  Plan to decrease to daily on 7/4/2021    Debility- (present on admission)  Assessment & Plan  PT and OT    Suicidal ideation- (present on admission)  Assessment & Plan  Legal hold discontinued 6/4/2021    Orthostatic  hypotension- (present on admission)  Assessment & Plan  off Florinef.    Mixed hyperlipidemia- (present on admission)  Assessment & Plan  Atorvastatin.     Lower limb amputation, great toe (HCC)- (present on admission)  Assessment & Plan  Monitor  No signs of infection     Hypomagnesemia- (present on admission)  Assessment & Plan  IV Mg 2 g  Follow level    Obesity due to excess calories with serious comorbidity- (present on admission)  Assessment & Plan  Body mass index is 50.08 kg/m².    Hypokalemia- (present on admission)  Assessment & Plan  Kdur  Follow bmp       VTE prophylaxis: Lovenox

## 2022-02-28 NOTE — TELEPHONE ENCOUNTER
----- Message from Yuliya Pollock sent at 8/11/2017  9:11 AM CDT -----  Contact: patient's daughter  Patient's daughter was returning a missed call, informed daughter staff already spoke with the patient and was given negative urine culture results, Patient's daughter wanted to ask about the patients GFR blood results, recommended patient's daughter to contact her PCP for those results or to use mychart, patient declined both and stated she wanted this office to give her the test results, please advise. Patient's daughter can be reached at [phone].   Clear

## 2022-09-02 NOTE — TELEPHONE ENCOUNTER
2022    TELEHEALTH EVALUATION -- Audio/Visual (During PEYFS-11 public health emergency)    Trey Espana (:  1980) has requested an audio/video evaluation for the following concern(s): Anxiety:  feels her anxiety has worsened within the past week. She is still taking Pristiq daily. She has taken Buspar very sparingly. States has week she had a complete overhall of her RA medications, she is having some family issues and believes she had a panic attack last week. She also mentions she started a steroid taper a few days ago also. She denies suicidal ideations. Review of Systems   Constitutional: Negative. Negative for fatigue and fever. Respiratory: Negative. Negative for cough, chest tightness, shortness of breath and wheezing. Cardiovascular:  Positive for palpitations. Negative for chest pain. Gastrointestinal: Negative. Negative for abdominal pain, diarrhea, nausea and vomiting. Neurological:  Negative for dizziness and headaches. Psychiatric/Behavioral:  Positive for decreased concentration. Negative for self-injury, sleep disturbance and suicidal ideas. The patient is nervous/anxious. Prior to Visit Medications    Medication Sig Taking?  Authorizing Provider   Upadacitinib ER (RINVOQ) 15 MG TB24 Take 15 mg by mouth daily Yes Yoana Alvarez MD   hydroxychloroquine (PLAQUENIL) 200 MG tablet Take 1 tablet by mouth 2 times daily Yes Yoana Alvarez MD   folic acid (FOLVITE) 1 MG tablet Take 1 tablet by mouth daily Yes Yoana Alvarez MD   methotrexate (RHEUMATREX) 2.5 MG chemo tablet Take 5 tablets by mouth once a week Yes Yoana Alvarez MD   busPIRone (BUSPAR) 5 MG tablet TAKE ONE TABLET BY MOUTH THREE TIMES A DAY AS NEEDED FOR ANXIETY Yes ANDREA Lozano CNP   fluconazole (DIFLUCAN) 150 MG tablet TAKE 1 TABLET BY MOUTH 1 TIME. MAY REPEAT 3 DAYS LATER IF SYMPTOMS PERSIST Yes ANDREA Lozano CNP   desvenlafaxine succinate (PRISTIQ) 100 MG TB24 Discussed with Patient's daughter about patient's labs. Her calcium was mildly elevated. Daughter reports she drinks a lot of milk at home and takes daily vitamins and Vit D supplements. Advised patient to look at the bottle and discontinue any calcium supplements she may be taking. All questions answered.       Maverick Arceo MD (PGY2)  Internal Medicine     extended release tablet TAKE 1 TABLET BY MOUTH DAILY Yes Brigid Rodgers MD   rOPINIRole (REQUIP) 3 MG tablet TAKE 1 TABLET BY MOUTH EVERY NIGHT Yes Brigid Rodgers MD   hydroCHLOROthiazide (MICROZIDE) 12.5 MG capsule TAKE 1 CAPSULE BY MOUTH EVERY MORNING Yes Brigid Rodgers MD   fluticasone (FLONASE) 50 MCG/ACT nasal spray SPRAY TWO SPRAYS IN EACH NOSTRIL ONCE DAILY Yes Tre MaddenANDREA - CNP   albuterol sulfate HFA (PROVENTIL HFA) 108 (90 Base) MCG/ACT inhaler Inhale 2 puffs into the lungs every 6 hours as needed for Wheezing Yes Tre Madden APRRABIA - CNP   valsartan (DIOVAN) 80 mg tablet Take 1 tablet by mouth daily Yes Tre Madden APRRABIA - CNP   pantoprazole (PROTONIX) 40 MG tablet TAKE 1 TABLET BY MOUTH DAILY Yes Tre MaddenANDREA - CNP   cetirizine (ZYRTEC) 10 MG tablet TAKE 1 TABLET BY MOUTH DAILY Yes Tre MaddenANDREA - CNP   estradiol (ESTRACE) 2 MG tablet Take 2 mg by mouth daily Yes Historical Provider, MD   Biotin 1000 MCG TABS Take by mouth Yes Historical Provider, MD   Cholecalciferol (VITAMIN D3) 50 MCG (2000 UT) CAPS Take 2,000 capsules by mouth daily Yes Historical Provider, MD   acetaminophen (TYLENOL) 500 MG tablet Take 500 mg by mouth every 6 hours as needed for Pain Yes Historical Provider, MD   SUMAtriptan (IMITREX) 100 MG tablet Take 100 mg by mouth daily as needed Yes Historical Provider, MD     Past Medical History:   Diagnosis Date    Anxiety     Arthritis     RA    Cellulitis of left lower extremity     left ankle    Depression     WELL CONTROLLED 10-16-17    Heart rate fast     intermittent    Kidney stone     Migraine     Motor tic disorder     Obstructive sleep apnea, adult      Past Surgical History:   Procedure Laterality Date    ABDOMINAL EXPLORATION SURGERY  07/22/2011    excision Meckels diverticulum    ABDOMINOPLASTY  04/14/2014    Tali Ybarra    ADENOIDECTOMY Bilateral     APPENDECTOMY  07/22/2011    BACK SURGERY      CARPAL TUNNEL RELEASE      HYSTERECTOMY, TOTAL ABDOMINAL (CERVIX REMOVED)  06/12/2018    robotic total hyst [facility], Dr Toño Chavez    LITHOTRIPSY  x2    LUMBAR LAMINECTOMY  06/2013    Rad; left l5-s1    MECKEL DIVERTICULUM EXCISION      OTHER SURGICAL HISTORY      hymenoplasty    OVARIAN CYST REMOVAL  04/13/2018    OPERATIVE LAPAROSCOPY, LEFT OVARIAN CYSTECTOMY WITH DILATATION AND CURETTAGE    OVARY REMOVAL      TONSILLECTOMY Bilateral     URETHRAL STRICTURE DILATATION      WISDOM TOOTH EXTRACTION       Family History   Problem Relation Age of Onset    Asthma Mother     High Blood Pressure Mother     Diabetes Father     Atrial Fibrillation Father     Alcohol Abuse Father     High Blood Pressure Father     Heart Disease Paternal Grandmother     Heart Attack Paternal Grandmother     Diabetes Paternal Grandmother     Stroke Paternal Grandfather     Stroke Maternal Grandfather     Atrial Fibrillation Maternal Grandmother     Diabetes Maternal Grandmother     Cancer Neg Hx      Allergies   Allergen Reactions    Metronidazole Rash and Other (See Comments)     LIPS WERE TINGLING, tongue tingling, hives all over body    Other      Glue used to adhere recent surgical incision     Social History     Tobacco Use    Smoking status: Never    Smokeless tobacco: Never   Vaping Use    Vaping Use: Never used   Substance Use Topics    Alcohol use: No     Alcohol/week: 0.0 standard drinks     Comment: once a week    Drug use: No        PHYSICAL EXAMINATION:  Vital Signs: (As obtained by patient/caregiver or practitioner observation)  [facility] 03/13/2018 (Exact Date)   Respiratory rate appears normal  Constitutional: Appears well-developed and well-nourished. No apparent distress    Mental status: Alert and awake. Oriented to person/place/darwin. Able to follow commands    Eyes: EOM normal. Sclera normal. No discharge visible  HENT: Normocephalic, atraumatic.    Mouth/Throat: Mucous membranes are moist. External Ears Normal    Neck: No visualized mass   Pulmonary/Chest: Respiratory effort normal.  No necessary. Patient identification was verified at the start of the visit: No    Total time spent on this encounter:  30  minutes    Services were provided through a video synchronous discussion virtually to substitute for in-person clinic visit. Patient and provider were located at their individual homes. --ANDREA Back CNP on 9/2/2022 at 12:54 PM    An electronic signature was used to authenticate this note. Angela Calderon

## 2023-11-30 NOTE — TELEPHONE ENCOUNTER
----- Message from Tri Youssef sent at 9/1/2017 10:27 AM CDT -----  Contact: pt daughter/Nasreen  Please call pt daughter at [phone]. Has questions regarding the patient taking Xarelto 15 mg. Spoke to you this week about this matter.  Also patient is no longer bleeding from the rectum. Dr Ann    Thank you  
Returned call to Pt's daughter. NO answer. Left message that It was okay for Pt to stop taking  Xarelto. Advised to return call if she had further questions.  
Statement Selected
